# Patient Record
Sex: FEMALE | Race: WHITE | NOT HISPANIC OR LATINO | Employment: UNEMPLOYED | URBAN - METROPOLITAN AREA
[De-identification: names, ages, dates, MRNs, and addresses within clinical notes are randomized per-mention and may not be internally consistent; named-entity substitution may affect disease eponyms.]

---

## 2022-01-31 ENCOUNTER — OFFICE VISIT (OUTPATIENT)
Dept: DIABETES SERVICES | Facility: HOSPITAL | Age: 47
End: 2022-01-31

## 2022-01-31 VITALS
OXYGEN SATURATION: 98 % | DIASTOLIC BLOOD PRESSURE: 88 MMHG | RESPIRATION RATE: 24 BRPM | WEIGHT: 159.17 LBS | BODY MASS INDEX: 26.52 KG/M2 | SYSTOLIC BLOOD PRESSURE: 155 MMHG | HEART RATE: 96 BPM | HEIGHT: 65 IN | TEMPERATURE: 99.1 F

## 2022-01-31 DIAGNOSIS — E10.69 CONTROLLED TYPE 1 DIABETES MELLITUS WITH OTHER COMPLICATION: ICD-10-CM

## 2022-01-31 DIAGNOSIS — E10.65 TYPE 1 DIABETES MELLITUS WITH HYPERGLYCEMIA: Primary | ICD-10-CM

## 2022-01-31 LAB — GLUCOSE BLDC GLUCOMTR-MCNC: 229 MG/DL (ref 70–99)

## 2022-01-31 PROCEDURE — 82962 GLUCOSE BLOOD TEST: CPT | Performed by: NURSE PRACTITIONER

## 2022-01-31 PROCEDURE — 99204 OFFICE O/P NEW MOD 45 MIN: CPT | Performed by: NURSE PRACTITIONER

## 2022-01-31 PROCEDURE — 83036 HEMOGLOBIN GLYCOSYLATED A1C: CPT | Performed by: NURSE PRACTITIONER

## 2022-01-31 PROCEDURE — G0463 HOSPITAL OUTPT CLINIC VISIT: HCPCS | Performed by: NURSE PRACTITIONER

## 2022-01-31 RX ORDER — FLUTICASONE PROPIONATE 50 MCG
SPRAY, SUSPENSION (ML) NASAL
COMMUNITY
End: 2023-01-17

## 2022-01-31 RX ORDER — DESLORATADINE 5 MG/1
TABLET ORAL
COMMUNITY
End: 2022-05-13

## 2022-01-31 RX ORDER — NITROFURANTOIN 25; 75 MG/1; MG/1
CAPSULE ORAL
COMMUNITY
End: 2022-05-13

## 2022-01-31 RX ORDER — INSULIN ASPART 100 [IU]/ML
INJECTION, SOLUTION INTRAVENOUS; SUBCUTANEOUS
COMMUNITY
End: 2022-04-07

## 2022-01-31 RX ORDER — PREDNISOLONE ACETATE 10 MG/ML
SUSPENSION/ DROPS OPHTHALMIC
COMMUNITY
End: 2022-05-13

## 2022-01-31 RX ORDER — CLARITHROMYCIN 500 MG/1
TABLET, COATED ORAL
COMMUNITY
End: 2022-05-13

## 2022-01-31 RX ORDER — SUCRALFATE 1 G/1
TABLET ORAL
COMMUNITY
End: 2022-05-13

## 2022-01-31 RX ORDER — MELOXICAM 7.5 MG/1
TABLET ORAL
COMMUNITY
End: 2022-05-13

## 2022-01-31 RX ORDER — CYCLOBENZAPRINE HCL 10 MG
TABLET ORAL
COMMUNITY
End: 2022-05-13

## 2022-01-31 RX ORDER — BUPROPION HYDROCHLORIDE 150 MG/1
TABLET ORAL
COMMUNITY
End: 2022-05-13

## 2022-01-31 RX ORDER — IBUPROFEN 600 MG/1
TABLET ORAL
COMMUNITY
End: 2023-01-17

## 2022-01-31 RX ORDER — OXYBUTYNIN CHLORIDE 10 MG/1
TABLET, EXTENDED RELEASE ORAL
COMMUNITY
End: 2022-05-13

## 2022-01-31 RX ORDER — NYSTATIN 100000 U/G
CREAM TOPICAL
COMMUNITY
End: 2022-05-13

## 2022-01-31 RX ORDER — FLUCONAZOLE 150 MG/1
TABLET ORAL
COMMUNITY
End: 2023-01-17

## 2022-01-31 RX ORDER — HYDROCODONE BITARTRATE AND ACETAMINOPHEN 5; 325 MG/1; MG/1
TABLET ORAL
COMMUNITY
End: 2023-01-17

## 2022-01-31 RX ORDER — INSULIN GLARGINE 100 [IU]/ML
INJECTION, SOLUTION SUBCUTANEOUS
COMMUNITY
End: 2022-05-13

## 2022-01-31 RX ORDER — BENZONATATE 100 MG/1
CAPSULE ORAL
COMMUNITY
End: 2022-05-13

## 2022-01-31 RX ORDER — DICLOFENAC SODIUM 75 MG/1
TABLET, DELAYED RELEASE ORAL
COMMUNITY
End: 2022-05-13

## 2022-01-31 RX ORDER — ALBUTEROL SULFATE 90 UG/1
AEROSOL, METERED RESPIRATORY (INHALATION)
COMMUNITY
End: 2023-01-17

## 2022-01-31 RX ORDER — FLUOXETINE HYDROCHLORIDE 40 MG/1
CAPSULE ORAL
COMMUNITY
End: 2022-05-13

## 2022-01-31 RX ORDER — GLUCAGON HYDROCHLORIDE 1 MG
KIT INJECTION
COMMUNITY

## 2022-01-31 RX ORDER — CLONAZEPAM 1 MG/1
TABLET ORAL
COMMUNITY
End: 2022-05-13

## 2022-01-31 RX ORDER — TEMAZEPAM 15 MG/1
CAPSULE ORAL
COMMUNITY
End: 2022-05-13

## 2022-01-31 RX ORDER — DIAZEPAM 2 MG/1
TABLET ORAL
COMMUNITY
End: 2023-01-17

## 2022-01-31 RX ORDER — AMITRIPTYLINE HYDROCHLORIDE 25 MG/1
TABLET, FILM COATED ORAL
COMMUNITY
End: 2022-05-13

## 2022-01-31 RX ORDER — INSULIN ASPART 100 [IU]/ML
INJECTION, SOLUTION INTRAVENOUS; SUBCUTANEOUS
COMMUNITY
End: 2022-04-13

## 2022-01-31 RX ORDER — RANITIDINE 150 MG/1
TABLET ORAL
COMMUNITY
End: 2022-05-13

## 2022-01-31 RX ORDER — VILAZODONE HYDROCHLORIDE 40 MG/1
TABLET ORAL
COMMUNITY
End: 2023-01-17

## 2022-01-31 RX ORDER — CHLORHEXIDINE GLUCONATE 0.12 MG/ML
RINSE ORAL
COMMUNITY
End: 2022-05-13

## 2022-01-31 RX ORDER — BUPROPION HYDROCHLORIDE 450 MG/1
TABLET, FILM COATED, EXTENDED RELEASE ORAL
COMMUNITY
End: 2022-05-13

## 2022-01-31 RX ORDER — FENOFIBRATE 145 MG/1
TABLET, COATED ORAL
COMMUNITY
End: 2022-05-13

## 2022-01-31 RX ORDER — QUETIAPINE FUMARATE 100 MG/1
TABLET, FILM COATED ORAL
COMMUNITY
End: 2022-05-13

## 2022-01-31 RX ORDER — DOXEPIN HYDROCHLORIDE 25 MG/1
CAPSULE ORAL
COMMUNITY
End: 2022-05-13

## 2022-01-31 RX ORDER — ONDANSETRON HYDROCHLORIDE 8 MG/1
TABLET, FILM COATED ORAL
COMMUNITY
End: 2023-01-17

## 2022-01-31 RX ORDER — BACLOFEN 10 MG/1
TABLET ORAL
COMMUNITY
End: 2023-01-17

## 2022-01-31 RX ORDER — OMEPRAZOLE 40 MG/1
CAPSULE, DELAYED RELEASE ORAL
COMMUNITY
End: 2023-01-17

## 2022-01-31 RX ORDER — LISINOPRIL 20 MG/1
TABLET ORAL
COMMUNITY
End: 2023-01-17

## 2022-01-31 RX ORDER — SUMATRIPTAN 25 MG/1
TABLET, FILM COATED ORAL
COMMUNITY
End: 2022-05-13

## 2022-01-31 RX ORDER — TIZANIDINE 2 MG/1
TABLET ORAL
COMMUNITY
End: 2022-05-13

## 2022-01-31 RX ORDER — CETIRIZINE HYDROCHLORIDE 10 MG/1
TABLET ORAL
COMMUNITY
End: 2023-01-17

## 2022-01-31 RX ORDER — ACYCLOVIR 800 MG/1
TABLET ORAL
COMMUNITY
End: 2022-05-13

## 2022-01-31 RX ORDER — INSULIN LISPRO 100 [IU]/ML
INJECTION, SOLUTION INTRAVENOUS; SUBCUTANEOUS
COMMUNITY
End: 2022-01-31

## 2022-02-01 LAB
EXPIRATION DATE: ABNORMAL
HBA1C MFR BLD: 7 %
Lab: ABNORMAL

## 2022-02-18 ENCOUNTER — TELEPHONE (OUTPATIENT)
Dept: DIABETES SERVICES | Facility: HOSPITAL | Age: 47
End: 2022-02-18

## 2022-03-04 ENCOUNTER — APPOINTMENT (OUTPATIENT)
Dept: NUTRITION | Facility: HOSPITAL | Age: 47
End: 2022-03-04

## 2022-03-25 ENCOUNTER — DOCUMENTATION (OUTPATIENT)
Dept: DIABETES SERVICES | Facility: CLINIC | Age: 47
End: 2022-03-25

## 2022-03-25 NOTE — PROGRESS NOTES
Patient called the office and stated that this morning she took 3 units of insulin with her breakfast meal and subsequently had severe hypoglycemia.  She is uncertain of exactly how much insulin she took because of confusion related to the hypoglycemic event.  She uses the InPen device so she was advised to go into the software application and review how much was actually administered.  She did treat her hypoglycemia with Mountain Dew and now has a glucose level greater than 400.  She has received her pump supplies and is anxious to get on the device.  She was advised we will call her and schedule first available appointment for her pump start.  She was also advised to contact our office if she continues to have problematic hyperglycemia or hypoglycemia now

## 2022-04-07 NOTE — TELEPHONE ENCOUNTER
Please send to  [facility]  [address] [place] [phone]

## 2022-04-14 ENCOUNTER — APPOINTMENT (OUTPATIENT)
Dept: DIABETES SERVICES | Facility: HOSPITAL | Age: 47
End: 2022-04-14

## 2022-04-20 ENCOUNTER — NUTRITION (OUTPATIENT)
Dept: NUTRITION | Facility: HOSPITAL | Age: 47
End: 2022-04-20

## 2022-04-20 PROCEDURE — 97802 MEDICAL NUTRITION INDIV IN: CPT | Performed by: DIETITIAN, REGISTERED

## 2022-04-21 ENCOUNTER — APPOINTMENT (OUTPATIENT)
Dept: DIABETES SERVICES | Facility: HOSPITAL | Age: 47
End: 2022-04-21

## 2022-04-29 RX ORDER — LANCETS
EACH MISCELLANEOUS
COMMUNITY
End: 2022-04-29 | Stop reason: SDUPTHER

## 2022-05-01 RX ORDER — LANCETS
102 EACH MISCELLANEOUS 4 TIMES DAILY
Qty: 102 EACH | Refills: 5 | Status: SHIPPED | OUTPATIENT
Start: 2022-05-01

## 2022-05-09 ENCOUNTER — TELEPHONE (OUTPATIENT)
Dept: DIABETES SERVICES | Facility: HOSPITAL | Age: 47
End: 2022-05-09

## 2022-05-09 NOTE — TELEPHONE ENCOUNTER
Pt left a message that she is still having problems with insurance and them not wanting to pay for her test strips. Pt does not have any. Has appt Friday. Pt would like a call back to talk about this and what she can/needs to do.

## 2022-05-10 VITALS — HEIGHT: 65 IN | WEIGHT: 156.31 LBS | BODY MASS INDEX: 26.04 KG/M2

## 2022-05-10 NOTE — CONSULTS
Xena Briseno is a 46 y.o. female who presents to [facility] for nutrition consult r/t diagnosis of T1DM.  Xena Briseno is referred by ANAMIKA Sotomayor*.    Past Medical History:   Diagnosis Date   • Anxiety    • Arthritis    • Asthma    • Depression    • Diabetes mellitus type I (HCC)    • GERD (gastroesophageal reflux disease)    • Hyperlipidemia    • Hypertension    • Sleep apnea        Anthropometrics    Wt Readings from Last 1 Encounters:   04/20/22 70.9 kg (156 lb 4.9 oz)     Body mass index is 26.01 kg/m².    Diabetes History  Diabetes History  What type of diabetes do you have?: Type 1  Length of Diabetes Diagnosis: 10 + years  Current DM knowledge: good  Have you had diabetes education/teaching in the past?: yes  Do you test your blood sugar at home?: yes    Education Preferences  Education Preferences  What areas of diabetes would you like to learn about?: diet information    Nutrition Information  Nutrition Information  Enter everything you can remember eating in the last 24 hours (1 day): breakfast- eggs, 1/2 slice fried bologna, 2 slices toast; lunch- dinner leftovers; dinner- chicken, green beans/corn/cooked cabbage/broccoli/peas; snacks- Cheezits; beverages- diet soda (1-2/day), water, coffee w/ creamer  How many meals do you eat each day?: 3  How many snacks do you eat each day?: 1  What is the biggest challenge you have with your diet?: Knowledge    Education Needs  DM Education Needs  Meter: Has own  Medication: Insulin  Problem Solving: Hypoglycemia  Healthy Eating: RD consult, Reviewed meal plan, Basic meal plan provided  Motivation: Engaged  Teaching Method: Explanation, Discussion, Handouts  Patient Response: Verbalized understanding    DM Goals  DM Goals  Healthy Eating - Goal: Today  Being Active - Goal: Today  Taking Medication - Goal: Today  Problem Solving - Goal: Today  Monitoring - Goal: Today  Contact Plan: Follow-up medical  care    Medications  Current Outpatient Medications   Medication Sig Dispense Refill   • Accu-Chek Softclix Lancets lancets 102 each by Other route 4 (Four) Times a Day. Use as instructed 102 each 5   • acyclovir (ZOVIRAX) 800 MG tablet acyclovir 800 mg tablet     • albuterol sulfate  (90 Base) MCG/ACT inhaler albuterol sulfate HFA 90 mcg/actuation aerosol inhaler     • amitriptyline (ELAVIL) 25 MG tablet amitriptyline 25 mg tablet     • baclofen (LIORESAL) 10 MG tablet baclofen 10 mg tablet     • benzonatate (TESSALON) 100 MG capsule benzonatate 100 mg capsule     • buPROPion XL (Forfivo XL) 450 MG 24 hr tablet Forfivo  mg tablet,extended release     • buPROPion XL (WELLBUTRIN XL) 150 MG 24 hr tablet bupropion HCl  mg 24 hr tablet, extended release     • cetirizine (zyrTEC) 10 MG tablet cetirizine 10 mg tablet     • chlorhexidine (Paroex) 0.12 % solution Paroex Oral Rinse 0.12 % mouthwash     • clarithromycin (BIAXIN) 500 MG tablet clarithromycin 500 mg tablet     • clonazePAM (KlonoPIN) 1 MG tablet clonazepam 1 mg tablet     • cyclobenzaprine (FLEXERIL) 10 MG tablet cyclobenzaprine 10 mg tablet     • desloratadine (CLARINEX) 5 MG tablet desloratadine 5 mg tablet     • diazePAM (VALIUM) 2 MG tablet diazepam 2 mg tablet     • diclofenac (VOLTAREN) 75 MG EC tablet diclofenac sodium 75 mg tablet,delayed release     • Diclofenac Sodium (VOLTAREN) 1 % gel gel diclofenac 1 % topical gel     • doxepin (SINEquan) 25 MG capsule doxepin 25 mg capsule     • fenofibrate (TRICOR) 145 MG tablet fenofibrate nanocrystallized 145 mg tablet     • fluconazole (DIFLUCAN) 150 MG tablet fluconazole 150 mg tablet     • FLUoxetine (PROzac) 40 MG capsule fluoxetine 40 mg capsule     • fluticasone (FLONASE) 50 MCG/ACT nasal spray fluticasone propionate 50 mcg/actuation nasal spray,suspension     • glucagon (GlucaGen HypoKit) 1 MG injection GlucaGen HypoKit 1 mg Injection     • glucose blood test strip 50 each by Other  route As Needed. Use as instructed     • glucose blood test strip 1 each by Other route 4 (Four) Times a Day. Use as instructed 150 each 5   • HYDROcodone-acetaminophen (NORCO) 5-325 MG per tablet hydrocodone 5 mg-acetaminophen 325 mg tablet     • ibuprofen (ADVIL,MOTRIN) 600 MG tablet ibuprofen 600 mg tablet     • insulin aspart (NovoLOG) 100 UNIT/ML injection Inject 65 Units under the skin into the appropriate area as directed Daily. 20 mL 5   • Insulin Glargine (Lantus SoloStar) 100 UNIT/ML injection pen Lantus Solostar U-100 Insulin 100 unit/mL (3 mL) subcutaneous pen     • Insulin Lispro 100 UNIT/ML solution cartridge Inject  under the skin into the appropriate area as directed.     • linaclotide (Linzess) 145 MCG capsule capsule Linzess 145 mcg capsule   Take 1 capsule every day by oral route as needed.     • lisinopril (PRINIVIL,ZESTRIL) 20 MG tablet lisinopril 20 mg tablet     • meloxicam (MOBIC) 7.5 MG tablet meloxicam 7.5 mg tablet     • neomycin-polymyxin-dexamethasone (MAXITROL) 0.1 % ophthalmic suspension neomycin-polymyxin-dexameth 3.5 mg/mL-10,000 unit/mL-0.1% eye drops     • nitrofurantoin, macrocrystal-monohydrate, (MACROBID) 100 MG capsule nitrofurantoin monohydrate/macrocrystals 100 mg capsule     • nystatin (MYCOSTATIN) 257089 UNIT/GM cream nystatin 100,000 unit/gram topical cream     • omeprazole (priLOSEC) 40 MG capsule omeprazole 40 mg capsule,delayed release     • ondansetron (ZOFRAN) 8 MG tablet ondansetron HCl 8 mg tablet     • oxybutynin XL (DITROPAN-XL) 10 MG 24 hr tablet oxybutynin chloride ER 10 mg tablet,extended release 24 hr     • prednisoLONE acetate (PRED FORTE) 1 % ophthalmic suspension prednisolone acetate 1 % eye drops,suspension     • QUEtiapine (SEROquel) 100 MG tablet quetiapine 100 mg tablet     • raNITIdine (ZANTAC) 150 MG tablet ranitidine 150 mg tablet     • sucralfate (CARAFATE) 1 g tablet sucralfate 1 gram tablet     • SUMAtriptan (IMITREX) 25 MG tablet sumatriptan 25  mg tablet     • temazepam (RESTORIL) 15 MG capsule temazepam 15 mg capsule     • tiZANidine (ZANAFLEX) 2 MG tablet tizanidine 2 mg tablet     • vilazodone (Viibryd) 40 MG tablet tablet Viibryd 40 mg tablet       No current facility-administered medications for this visit.       Labs   Lab Results   Component Value Date    HGBA1C 7 02/01/2022       Nutrition counseling provided on carbohydrate counting, portion control, measuring and reading labels. Discussed eating out and gave suggestions on controlling carbohydrate intake and making healthier food choices.     Meal Plan:   Total Carbohydrates per meal: 2-3 carb servings/meal, at least 3 meals/day  Lean protein with meals.  Limit added fats.  Snacks: 1 carbohydrate serving (</= 22 g) + 1 protein serving.     Daily exercise encouraged (as recommended by healthcare provider). Discussed the benefits of exercise in lowering blood glucose, blood pressure, cholesterol, stress and controlling body weight.     Advised to continue daily blood glucose monitoring to assist with understanding of factors affecting blood glucose and assist with management of diabetes.  Discussed and provided with target BG ranges.     Literature provided: Diabetes Nutrition Placemat, Choose Your Foods Booklet    Dietitian contact number provided.  Patient encouraged to call with questions or concerns.     Time spent with patient: 45 minutes    Celia Cross RDN, LD  04/20/2022

## 2022-05-13 ENCOUNTER — OFFICE VISIT (OUTPATIENT)
Dept: DIABETES SERVICES | Facility: CLINIC | Age: 47
End: 2022-05-13

## 2022-05-13 VITALS
TEMPERATURE: 97.6 F | OXYGEN SATURATION: 98 % | HEIGHT: 65 IN | WEIGHT: 154 LBS | SYSTOLIC BLOOD PRESSURE: 116 MMHG | DIASTOLIC BLOOD PRESSURE: 63 MMHG | BODY MASS INDEX: 25.66 KG/M2 | HEART RATE: 97 BPM

## 2022-05-13 DIAGNOSIS — E10.65 TYPE 1 DIABETES MELLITUS WITH HYPERGLYCEMIA: ICD-10-CM

## 2022-05-13 DIAGNOSIS — E10.65 UNCONTROLLED TYPE 1 DIABETES MELLITUS WITH HYPERGLYCEMIA: Primary | ICD-10-CM

## 2022-05-13 LAB
EXPIRATION DATE: ABNORMAL
HBA1C MFR BLD: 7.4 %
Lab: ABNORMAL

## 2022-05-13 PROCEDURE — 95251 CONT GLUC MNTR ANALYSIS I&R: CPT | Performed by: NURSE PRACTITIONER

## 2022-05-13 PROCEDURE — 99213 OFFICE O/P EST LOW 20 MIN: CPT | Performed by: NURSE PRACTITIONER

## 2022-05-13 NOTE — PROGRESS NOTES
Chief Complaint  Diabetes (Loves the pump and this has helped out greatly)    Referred By: No ref. provider found    Subjective          Xena Briseno presents to [facility] for insulin pump management    History of Present Illness    Visit type:  follow-up  Diabetes type:  Type 1  Current diabetes status/concerns/issues:  She has recently been started on a Medtronic insulin pump.  It is improved how she feels buy being in better control.  Other health concerns: She has had a lot of emotional stress recently but it seems to be slowly improving and turning around for her  Diabetes symptoms:    Polyuria: Yes   Polydipsia: No   Polyphagia: No   Blurred vision: No   Excessive fatigue: Yes  Diabetes complications:  Neuro: None  Renal: None  Eyes: None  Amputation/Wounds: None  GI: gas and bloating and irregular bowel movements  Cardiovascular: HTN and hyperlipidemia  ED: N/A  Other: None  Hypoglycemia:  None reported at this time  Hypoglycemia Symptoms:  No hypoglycemia at this time  Current diabetes treatment:  Medtronic insulin pump using NovoLog insulin.  Blood glucose device:  [employer] CGM  Blood glucose monitoring frequency:  Continuous per CGM  Blood glucose range/average:  The pump report shows a sensor glucose average of 152 mg/dL) 40 mg/dL.  76% of glucose levels are in target range between 70 and 180 while 24% are above target.  She is doing an excellent job entering her carbohydrates and glucose levels into her device.  Diet:  Carbohydrate Counting, Avoids high carb/sweet foods, Avoids sugary drinks  Activity/Exercise:  None    Past Medical History:   Diagnosis Date   • Anxiety    • Arthritis    • Asthma    • Depression    • Diabetes mellitus type I (HCC)    • GERD (gastroesophageal reflux disease)    • Hyperlipidemia    • Hypertension    • Sleep apnea      Past Surgical History:   Procedure Laterality Date   •  SECTION     • CHOLECYSTECTOMY     • ENDOSCOPY     •  SPINE SURGERY       Family History   Problem Relation Age of Onset   • Heart disease Mother    • Diabetes Father    • Diabetes Brother    • Stroke Paternal Grandmother      Social History     Socioeconomic History   • Marital status: Single   Tobacco Use   • Smoking status: Current Every Day Smoker   • Smokeless tobacco: Never Used   Vaping Use   • Vaping Use: Never used   Substance and Sexual Activity   • Alcohol use: Not Currently   • Drug use: Never   • Sexual activity: Defer     Allergies   Allergen Reactions   • Statins Angioedema       Current Outpatient Medications:   •  Accu-Chek Softclix Lancets lancets, 102 each by Other route 4 (Four) Times a Day. Use as instructed, Disp: 102 each, Rfl: 5  •  albuterol sulfate  (90 Base) MCG/ACT inhaler, albuterol sulfate HFA 90 mcg/actuation aerosol inhaler, Disp: , Rfl:   •  baclofen (LIORESAL) 10 MG tablet, baclofen 10 mg tablet, Disp: , Rfl:   •  cetirizine (zyrTEC) 10 MG tablet, cetirizine 10 mg tablet, Disp: , Rfl:   •  diazePAM (VALIUM) 2 MG tablet, diazepam 2 mg tablet, Disp: , Rfl:   •  fluconazole (DIFLUCAN) 150 MG tablet, fluconazole 150 mg tablet, Disp: , Rfl:   •  fluticasone (FLONASE) 50 MCG/ACT nasal spray, fluticasone propionate 50 mcg/actuation nasal spray,suspension, Disp: , Rfl:   •  glucagon (GlucaGen HypoKit) 1 MG injection, GlucaGen HypoKit 1 mg Injection, Disp: , Rfl:   •  glucose blood test strip, 50 each by Other route As Needed. Use as instructed, Disp: , Rfl:   •  glucose blood test strip, 1 each by Other route 4 (Four) Times a Day. Use as instructed, Disp: 150 each, Rfl: 5  •  HYDROcodone-acetaminophen (NORCO) 5-325 MG per tablet, hydrocodone 5 mg-acetaminophen 325 mg tablet, Disp: , Rfl:   •  ibuprofen (ADVIL,MOTRIN) 600 MG tablet, ibuprofen 600 mg tablet, Disp: , Rfl:   •  insulin aspart (NovoLOG) 100 UNIT/ML injection, Inject 65 Units under the skin into the appropriate area as directed Daily., Disp: 20 mL, Rfl: 5  •  Insulin Lispro  100 UNIT/ML solution cartridge, Inject  under the skin into the appropriate area as directed., Disp: , Rfl:   •  linaclotide (LINZESS) 145 MCG capsule capsule, Linzess 145 mcg capsule  Take 1 capsule every day by oral route as needed., Disp: , Rfl:   •  lisinopril (PRINIVIL,ZESTRIL) 20 MG tablet, lisinopril 20 mg tablet, Disp: , Rfl:   •  omeprazole (priLOSEC) 40 MG capsule, omeprazole 40 mg capsule,delayed release, Disp: , Rfl:   •  ondansetron (ZOFRAN) 8 MG tablet, ondansetron HCl 8 mg tablet, Disp: , Rfl:   •  vilazodone (VIIBRYD) 40 MG tablet tablet, Viibryd 40 mg tablet, Disp: , Rfl:     Review of Systems   Constitutional: Positive for fatigue. Negative for activity change, appetite change, fever, unexpected weight gain and unexpected weight loss.   HENT: Negative for congestion, ear pain, facial swelling, hearing loss, sore throat and tinnitus.    Eyes: Negative for blurred vision, double vision, redness and visual disturbance.   Respiratory: Negative for cough, shortness of breath and wheezing.    Cardiovascular: Negative for chest pain, palpitations and leg swelling.   Gastrointestinal: Positive for abdominal pain, constipation, diarrhea, nausea, vomiting, GERD and indigestion. Negative for abdominal distention.   Endocrine: Positive for polyuria. Negative for polydipsia and polyphagia.   Genitourinary: Negative for difficulty urinating, frequency and urgency.   Musculoskeletal: Positive for arthralgias, back pain, gait problem, myalgias and neck pain.   Skin: Negative for rash, skin lesions and wound.   Neurological: Positive for memory problem. Negative for seizures, speech difficulty, weakness, headache and confusion.   Psychiatric/Behavioral: Positive for sleep disturbance, depressed mood and stress. The patient is nervous/anxious.         Objective     Vitals:    05/13/22 1608   BP: 116/63   BP Location: Right arm   Patient Position: Sitting   Cuff Size: Adult   Pulse: 97   Temp: 97.6 °F (36.4 °C)  "  SpO2: 98%   Weight: 69.9 kg (154 lb)   Height: 165.1 cm (65\")   PainSc:   4     Body mass index is 25.63 kg/m².    Physical Exam  Constitutional:       Appearance: Normal appearance.      Comments: Overweight with BMI of 25.63   HENT:      Head: Normocephalic and atraumatic.      Right Ear: External ear normal.      Left Ear: External ear normal.      Nose: Nose normal.   Eyes:      Extraocular Movements: Extraocular movements intact.      Conjunctiva/sclera: Conjunctivae normal.   Pulmonary:      Effort: Pulmonary effort is normal.   Musculoskeletal:         General: Normal range of motion.      Cervical back: Normal range of motion.   Skin:     General: Skin is warm and dry.   Neurological:      General: No focal deficit present.      Mental Status: She is alert and oriented to person, place, and time. Mental status is at baseline.   Psychiatric:         Mood and Affect: Mood normal.         Behavior: Behavior normal.         Thought Content: Thought content normal.         Judgment: Judgment normal.         Result Review :   The following data was reviewed by: ARACELI Sotomayor on 05/13/2022:    Point-of-care A1c collected in the office today was 7.4% indicating uncontrolled type 1 diabetes.  This is up from the prior result of 7% collected in February of this year.    Most Recent A1C    HGBA1C Most Recent 5/13/22   Hemoglobin A1C 7.4             A1C Last 3 Results    HGBA1C Last 3 Results 2/1/22 5/13/22   Hemoglobin A1C 7 7.4                   Assessment: The patient's glucose levels are improving considerably with the use of the insulin pump.  She is adjusting to using the pump as well as having more normalized glucose levels.  She has had a lots of stress in her personal life recently but states that the situation is improving.      Diagnoses and all orders for this visit:    1. Uncontrolled type 1 diabetes mellitus with hyperglycemia (HCC) (Primary)    2. Type 1 diabetes mellitus with hyperglycemia " ([facility])  -     POC Glycosylated Hemoglobin (Hb A1C)        Plan: We made no changes to the current pump settings.  She will continue to focus on accuracy of carb counting and use of the insulin pump.  She will be scheduled for routine follow-up appointment    The patient will monitor her blood glucose levels using her continuous glucose sensor.  If she develops problematic hyperglycemia or hypoglycemia or adverse drug reactions, she will contact the office for further instructions.        Follow Up     Return in about 2 months (around 7/13/2022) for Pump Eval, CGM Follow-up.    Patient was given instructions and counseling regarding her condition or for health maintenance advice. Please see specific information pulled into the AVS if appropriate.     Jaquelin Grover, APRN  05/13/2022

## 2022-07-29 ENCOUNTER — OFFICE VISIT (OUTPATIENT)
Dept: DIABETES SERVICES | Facility: CLINIC | Age: 47
End: 2022-07-29

## 2022-07-29 VITALS
OXYGEN SATURATION: 98 % | HEIGHT: 65 IN | DIASTOLIC BLOOD PRESSURE: 75 MMHG | TEMPERATURE: 97.6 F | SYSTOLIC BLOOD PRESSURE: 117 MMHG | WEIGHT: 165 LBS | HEART RATE: 84 BPM | BODY MASS INDEX: 27.49 KG/M2

## 2022-07-29 DIAGNOSIS — E10.65 UNCONTROLLED TYPE 1 DIABETES MELLITUS WITH HYPERGLYCEMIA: Primary | ICD-10-CM

## 2022-07-29 DIAGNOSIS — E66.3 OVERWEIGHT (BMI 25.0-29.9): ICD-10-CM

## 2022-07-29 LAB
EXPIRATION DATE: ABNORMAL
HBA1C MFR BLD: 7.3 %
Lab: ABNORMAL

## 2022-07-29 PROCEDURE — 95251 CONT GLUC MNTR ANALYSIS I&R: CPT | Performed by: NURSE PRACTITIONER

## 2022-07-29 PROCEDURE — 99213 OFFICE O/P EST LOW 20 MIN: CPT | Performed by: NURSE PRACTITIONER

## 2022-09-08 RX ORDER — INSULIN ASPART 100 [IU]/ML
INJECTION, SOLUTION INTRAVENOUS; SUBCUTANEOUS
Qty: 20 ML | Refills: 3 | Status: SHIPPED | OUTPATIENT
Start: 2022-09-08 | End: 2023-01-17

## 2022-11-18 RX ORDER — BLOOD SUGAR DIAGNOSTIC
STRIP MISCELLANEOUS
Qty: 100 EACH | Refills: 5 | Status: SHIPPED | OUTPATIENT
Start: 2022-11-18 | End: 2023-01-17

## 2023-01-04 ENCOUNTER — TELEPHONE (OUTPATIENT)
Dept: DIABETES SERVICES | Facility: HOSPITAL | Age: 48
End: 2023-01-04
Payer: MEDICARE

## 2023-01-13 ENCOUNTER — OFFICE VISIT (OUTPATIENT)
Dept: DIABETES SERVICES | Facility: CLINIC | Age: 48
End: 2023-01-13
Payer: MEDICARE

## 2023-01-13 VITALS
HEART RATE: 64 BPM | SYSTOLIC BLOOD PRESSURE: 141 MMHG | DIASTOLIC BLOOD PRESSURE: 69 MMHG | BODY MASS INDEX: 21.83 KG/M2 | WEIGHT: 131 LBS | TEMPERATURE: 96.4 F | OXYGEN SATURATION: 100 % | HEIGHT: 65 IN

## 2023-01-13 DIAGNOSIS — E10.65 UNCONTROLLED TYPE 1 DIABETES MELLITUS WITH HYPERGLYCEMIA: Primary | ICD-10-CM

## 2023-01-13 DIAGNOSIS — Z91.199 NONCOMPLIANCE WITH TREATMENT PLAN: ICD-10-CM

## 2023-01-13 LAB
EXPIRATION DATE: ABNORMAL
HBA1C MFR BLD: 9.2 %
Lab: ABNORMAL

## 2023-01-13 PROCEDURE — 99215 OFFICE O/P EST HI 40 MIN: CPT | Performed by: NURSE PRACTITIONER

## 2023-01-13 NOTE — PROGRESS NOTES
Chief Complaint  Diabetes (Follow up, med mgt, a1c eval )    Referred By: No ref. provider found    Subjective          Xena Briseno presents to [facility] for diabetes medication management    History of Present Illness    Visit type:  follow-up  Diabetes type:  Type 1  Current diabetes status/concerns/issues:  This patient was last seen in 2022.  Upon presentation to the office today the patient is very ill appearing.  She states her glucose level is 535 mg/dL this morning.  She is most likely in DKA.  She is complaining of diarrhea with nausea and vomiting.  She states she has had 7 episodes of diarrhea today.  She is also complaining of chest pain.  She is accompanied by friends who brought her to the appointment today.  When questioning the patient regarding how much insulin she is taking she is very confused.  She is not currently using her insulin pump.  She indicates she does not understand her sliding scale that was prescribed after her last hospitalization.  She states she has been sick like this since December.  She cannot recall how much insulin she took when she checked her blood sugar this morning.    She has been seen in the emergency room 3-4 times over the last couple of weeks because of DKA.  She has gone to [facility] and [facility].  Her friends indicate that she will come out of the hospital and go right back into this condition over and over again.      Past Medical History:   Diagnosis Date   • Anxiety    • Arthritis    • Asthma    • Depression    • Diabetes mellitus type I (HCC)    • GERD (gastroesophageal reflux disease)    • Hyperlipidemia    • Hypertension    • Sleep apnea      Past Surgical History:   Procedure Laterality Date   •  SECTION     • CHOLECYSTECTOMY     • ENDOSCOPY     • SPINE SURGERY       Family History   Problem Relation Age of Onset   • Heart disease Mother    • Diabetes Father    • Diabetes Brother    •  Stroke Paternal Grandmother      Social History     Socioeconomic History   • Marital status: Single   • Number of children: 2   Tobacco Use   • Smoking status: Every Day   • Smokeless tobacco: Never   Vaping Use   • Vaping Use: Never used   Substance and Sexual Activity   • Alcohol use: Not Currently   • Drug use: Never   • Sexual activity: Defer     Allergies   Allergen Reactions   • Statins Angioedema       Current Outpatient Medications:   •  Accu-Chek Softclix Lancets lancets, 102 each by Other route 4 (Four) Times a Day. Use as instructed, Disp: 102 each, Rfl: 5  •  albuterol sulfate  (90 Base) MCG/ACT inhaler, albuterol sulfate HFA 90 mcg/actuation aerosol inhaler, Disp: , Rfl:   •  cetirizine (zyrTEC) 10 MG tablet, cetirizine 10 mg tablet, Disp: , Rfl:   •  glucose blood test strip, 50 each by Other route As Needed. Use as instructed, Disp: , Rfl:   •  ibuprofen (ADVIL,MOTRIN) 600 MG tablet, ibuprofen 600 mg tablet, Disp: , Rfl:   •  linaclotide (LINZESS) 145 MCG capsule capsule, Linzess 145 mcg capsule  Take 1 capsule every day by oral route as needed., Disp: , Rfl:   •  ondansetron (ZOFRAN) 8 MG tablet, ondansetron HCl 8 mg tablet, Disp: , Rfl:   •  Accu-Chek Guide test strip, TEST FOUR TIMES A DAY, Disp: 100 each, Rfl: 5  •  baclofen (LIORESAL) 10 MG tablet, baclofen 10 mg tablet, Disp: , Rfl:   •  diazePAM (VALIUM) 2 MG tablet, diazepam 2 mg tablet, Disp: , Rfl:   •  fluconazole (DIFLUCAN) 150 MG tablet, fluconazole 150 mg tablet, Disp: , Rfl:   •  fluticasone (FLONASE) 50 MCG/ACT nasal spray, fluticasone propionate 50 mcg/actuation nasal spray,suspension, Disp: , Rfl:   •  glucagon (GlucaGen HypoKit) 1 MG injection, GlucaGen HypoKit 1 mg Injection, Disp: , Rfl:   •  HYDROcodone-acetaminophen (NORCO) 5-325 MG per tablet, hydrocodone 5 mg-acetaminophen 325 mg tablet, Disp: , Rfl:   •  Insulin Lispro 100 UNIT/ML solution cartridge, Inject  under the skin into the appropriate area as directed., Disp:  ", Rfl:   •  lisinopril (PRINIVIL,ZESTRIL) 20 MG tablet, lisinopril 20 mg tablet, Disp: , Rfl:   •  NovoLOG 100 UNIT/ML injection, INJECT 65 UNITS AS DIRECTED EVERY DAY, Disp: 20 mL, Rfl: 3  •  omeprazole (priLOSEC) 40 MG capsule, omeprazole 40 mg capsule,delayed release, Disp: , Rfl:   •  vilazodone (VIIBRYD) 40 MG tablet tablet, Viibryd 40 mg tablet, Disp: , Rfl:     Review of Systems   Constitutional: Positive for activity change, appetite change and fatigue. Negative for unexpected weight gain and unexpected weight loss.   Eyes: Positive for blurred vision and visual disturbance.   Gastrointestinal: Positive for abdominal pain, constipation, diarrhea, nausea, vomiting, GERD and indigestion.   Endocrine: Positive for polydipsia and polyuria. Negative for polyphagia.   Neurological: Positive for numbness.        Objective     Vitals:    01/13/23 1449   BP: 141/69   BP Location: Right arm   Patient Position: Sitting   Cuff Size: Adult   Pulse: 64   Temp: 96.4 °F (35.8 °C)   SpO2: 100%   Weight: 59.4 kg (131 lb)   Height: 165.1 cm (65\")   PainSc:   7     Body mass index is 21.8 kg/m².    Physical Exam  Constitutional:       General: She is in acute distress.      Appearance: She is normal weight. She is ill-appearing and toxic-appearing.      Comments: The patient looks very sickly.  Her color is pale.  She is holding her chest at times.  She is very weak on her feet and requires assistance to stay upright with ambulation.   HENT:      Head: Normocephalic and atraumatic.      Right Ear: External ear normal.      Left Ear: External ear normal.      Nose: Nose normal.   Eyes:      Extraocular Movements: Extraocular movements intact.      Conjunctiva/sclera: Conjunctivae normal.   Pulmonary:      Effort: Pulmonary effort is normal.   Musculoskeletal:         General: Normal range of motion.      Cervical back: Normal range of motion.   Skin:     General: Skin is warm and dry.      Coloration: Skin is pale. "   Neurological:      Mental Status: Mental status is at baseline.      Comments: She is able to answer some questions clearly but then seems confused when attempting to answer other questions   Psychiatric:         Mood and Affect: Mood normal.         Behavior: Behavior normal.         Thought Content: Thought content normal.         Judgment: Judgment normal.         Result Review :   The following data was reviewed by: ARACELI Sotomayor on 01/13/2023:    Most Recent A1C    HGBA1C Most Recent 1/13/23   Hemoglobin A1C 9.2 (A)   (A) Abnormal value              A1C Last 3 Results    HGBA1C Last 3 Results 5/13/22 7/29/22 1/13/23   Hemoglobin A1C 7.4 7.3 9.2 (A)   (A) Abnormal value            Point-of-care A1c in the office is 9.2% indicating uncontrolled type 1 diabetes          Assessment: The patient is acutely ill and is most likely in DKA again.  The patient is noncompliant with her insulin.  She is not using her insulin pump.  She is confused about how much basal and bolus insulin she is supposed to be using.  Her breath smells ketotic.  She has a foul smell of diarrhea.  She is very weak and shaky and unable to stand on her feet stably without assistance.      Diagnoses and all orders for this visit:    1. Uncontrolled type 1 diabetes mellitus with hyperglycemia (HCC) (Primary)  -     POC Glycosylated Hemoglobin (Hb A1C)    2. Noncompliance with treatment plan        Plan: The patient was advised that she needs to leave the office and go directly to the emergency room for treatment and management of acute DKA.  She is to have the nursing staff call our office to schedule an appointment as soon as possible after her discharge so we can intervene in regards to the use of her insulin pump or her insulin therapy.          Follow Up     No follow-ups on file.    Patient was given instructions and counseling regarding her condition or for health maintenance advice. Please see specific information pulled into  the AVS if appropriate.     Jaquelin Grover, APRN  01/13/2023      Dictated Utilizing Dragon Dictation.  Please note that portions of this note were completed with a voice recognition program.  Part of this note may be an electronic transcription/translation of spoken language to printed text using the Dragon Dictation System.

## 2023-01-14 ENCOUNTER — HOSPITAL ENCOUNTER (INPATIENT)
Facility: HOSPITAL | Age: 48
LOS: 3 days | Discharge: HOME | DRG: 246 | End: 2023-01-17
Attending: INTERNAL MEDICINE | Admitting: INTERNAL MEDICINE
Payer: MEDICARE

## 2023-01-14 ENCOUNTER — APPOINTMENT (OUTPATIENT)
Dept: GENERAL RADIOLOGY | Facility: HOSPITAL | Age: 48
DRG: 246 | End: 2023-01-14
Payer: MEDICARE

## 2023-01-14 DIAGNOSIS — R77.8 ELEVATED TROPONIN: Primary | ICD-10-CM

## 2023-01-14 DIAGNOSIS — Z95.5 PRESENCE OF STENT IN LEFT CIRCUMFLEX CORONARY ARTERY: ICD-10-CM

## 2023-01-14 PROBLEM — E11.10 DKA (DIABETIC KETOACIDOSIS): Status: ACTIVE | Noted: 2023-01-14

## 2023-01-14 LAB
ALBUMIN SERPL-MCNC: 3.3 G/DL (ref 3.5–5.2)
ALBUMIN/GLOB SERPL: 1.7 G/DL
ALP SERPL-CCNC: 94 U/L (ref 39–117)
ALT SERPL W P-5'-P-CCNC: 28 U/L (ref 1–33)
ANION GAP SERPL CALCULATED.3IONS-SCNC: 10.1 MMOL/L (ref 5–15)
ANION GAP SERPL CALCULATED.3IONS-SCNC: 11 MMOL/L (ref 5–15)
ANION GAP SERPL CALCULATED.3IONS-SCNC: 14.9 MMOL/L (ref 5–15)
ANION GAP SERPL CALCULATED.3IONS-SCNC: 22 MMOL/L (ref 5–15)
ANION GAP SERPL CALCULATED.3IONS-SCNC: 8 MMOL/L (ref 5–15)
APTT PPP: 28.9 SECONDS (ref 22.7–35.4)
AST SERPL-CCNC: 21 U/L (ref 1–32)
ATMOSPHERIC PRESS: 754.6 MMHG
B-OH-BUTYR SERPL-SCNC: 8.15 MMOL/L (ref 0.02–0.27)
BASE EXCESS BLDV CALC-SCNC: -18.9 MMOL/L (ref -2–2)
BDY SITE: ABNORMAL
BILIRUB SERPL-MCNC: <0.2 MG/DL (ref 0–1.2)
BUN SERPL-MCNC: 10 MG/DL (ref 6–20)
BUN SERPL-MCNC: 5 MG/DL (ref 6–20)
BUN SERPL-MCNC: 5 MG/DL (ref 6–20)
BUN SERPL-MCNC: 7 MG/DL (ref 6–20)
BUN SERPL-MCNC: 9 MG/DL (ref 6–20)
BUN/CREAT SERPL: 10 (ref 7–25)
BUN/CREAT SERPL: 12.2 (ref 7–25)
BUN/CREAT SERPL: 14.3 (ref 7–25)
BUN/CREAT SERPL: 8.5 (ref 7–25)
BUN/CREAT SERPL: 8.5 (ref 7–25)
CALCIUM SPEC-SCNC: 7.3 MG/DL (ref 8.6–10.5)
CALCIUM SPEC-SCNC: 7.5 MG/DL (ref 8.6–10.5)
CALCIUM SPEC-SCNC: 7.7 MG/DL (ref 8.6–10.5)
CALCIUM SPEC-SCNC: 7.7 MG/DL (ref 8.6–10.5)
CALCIUM SPEC-SCNC: 8 MG/DL (ref 8.6–10.5)
CHLORIDE SERPL-SCNC: 107 MMOL/L (ref 98–107)
CHLORIDE SERPL-SCNC: 108 MMOL/L (ref 98–107)
CHLORIDE SERPL-SCNC: 109 MMOL/L (ref 98–107)
CHLORIDE SERPL-SCNC: 110 MMOL/L (ref 98–107)
CHLORIDE SERPL-SCNC: 110 MMOL/L (ref 98–107)
CO2 SERPL-SCNC: 11.1 MMOL/L (ref 22–29)
CO2 SERPL-SCNC: 14.9 MMOL/L (ref 22–29)
CO2 SERPL-SCNC: 16 MMOL/L (ref 22–29)
CO2 SERPL-SCNC: 17 MMOL/L (ref 22–29)
CO2 SERPL-SCNC: 6 MMOL/L (ref 22–29)
CREAT SERPL-MCNC: 0.59 MG/DL (ref 0.57–1)
CREAT SERPL-MCNC: 0.59 MG/DL (ref 0.57–1)
CREAT SERPL-MCNC: 0.7 MG/DL (ref 0.57–1)
CREAT SERPL-MCNC: 0.7 MG/DL (ref 0.57–1)
CREAT SERPL-MCNC: 0.74 MG/DL (ref 0.57–1)
D-LACTATE SERPL-SCNC: 0.8 MMOL/L (ref 0.5–2)
EGFRCR SERPLBLD CKD-EPI 2021: 106.8 ML/MIN/1.73
EGFRCR SERPLBLD CKD-EPI 2021: 106.8 ML/MIN/1.73
EGFRCR SERPLBLD CKD-EPI 2021: 111.3 ML/MIN/1.73
EGFRCR SERPLBLD CKD-EPI 2021: 111.3 ML/MIN/1.73
EGFRCR SERPLBLD CKD-EPI 2021: 99.9 ML/MIN/1.73
GLOBULIN UR ELPH-MCNC: 1.9 GM/DL
GLUCOSE BLDC GLUCOMTR-MCNC: 107 MG/DL (ref 70–130)
GLUCOSE BLDC GLUCOMTR-MCNC: 108 MG/DL (ref 70–130)
GLUCOSE BLDC GLUCOMTR-MCNC: 108 MG/DL (ref 70–130)
GLUCOSE BLDC GLUCOMTR-MCNC: 110 MG/DL (ref 70–130)
GLUCOSE BLDC GLUCOMTR-MCNC: 112 MG/DL (ref 70–130)
GLUCOSE BLDC GLUCOMTR-MCNC: 114 MG/DL (ref 70–130)
GLUCOSE BLDC GLUCOMTR-MCNC: 116 MG/DL (ref 70–130)
GLUCOSE BLDC GLUCOMTR-MCNC: 125 MG/DL (ref 70–130)
GLUCOSE BLDC GLUCOMTR-MCNC: 140 MG/DL (ref 70–130)
GLUCOSE BLDC GLUCOMTR-MCNC: 149 MG/DL (ref 70–130)
GLUCOSE BLDC GLUCOMTR-MCNC: 168 MG/DL (ref 70–130)
GLUCOSE BLDC GLUCOMTR-MCNC: 200 MG/DL (ref 70–130)
GLUCOSE BLDC GLUCOMTR-MCNC: 213 MG/DL (ref 70–130)
GLUCOSE BLDC GLUCOMTR-MCNC: 215 MG/DL (ref 70–130)
GLUCOSE BLDC GLUCOMTR-MCNC: 247 MG/DL (ref 70–130)
GLUCOSE BLDC GLUCOMTR-MCNC: 265 MG/DL (ref 70–130)
GLUCOSE BLDC GLUCOMTR-MCNC: 271 MG/DL (ref 70–130)
GLUCOSE BLDC GLUCOMTR-MCNC: 93 MG/DL (ref 70–130)
GLUCOSE SERPL-MCNC: 159 MG/DL (ref 65–99)
GLUCOSE SERPL-MCNC: 169 MG/DL (ref 65–99)
GLUCOSE SERPL-MCNC: 255 MG/DL (ref 65–99)
GLUCOSE SERPL-MCNC: 355 MG/DL (ref 65–99)
GLUCOSE SERPL-MCNC: 88 MG/DL (ref 65–99)
HBA1C MFR BLD: 9.8 % (ref 4.8–5.6)
HCO3 BLDV-SCNC: 6.9 MMOL/L (ref 22–28)
INR PPP: 1 (ref 0.9–1.1)
MAGNESIUM SERPL-MCNC: 1.8 MG/DL (ref 1.6–2.6)
MAGNESIUM SERPL-MCNC: 1.9 MG/DL (ref 1.6–2.6)
MAGNESIUM SERPL-MCNC: 2 MG/DL (ref 1.6–2.6)
MAGNESIUM SERPL-MCNC: 2.5 MG/DL (ref 1.6–2.6)
MAGNESIUM SERPL-MCNC: 4.4 MG/DL (ref 1.6–2.6)
MODALITY: ABNORMAL
OSMOLALITY SERPL: 293 MOSM/KG (ref 275–300)
PCO2 BLDV: 17.7 MM HG (ref 41–51)
PH BLDV: 7.2 PH UNITS (ref 7.31–7.41)
PHOSPHATE SERPL-MCNC: 1.2 MG/DL (ref 2.5–4.5)
PHOSPHATE SERPL-MCNC: 1.2 MG/DL (ref 2.5–4.5)
PHOSPHATE SERPL-MCNC: 1.3 MG/DL (ref 2.5–4.5)
PHOSPHATE SERPL-MCNC: 2.1 MG/DL (ref 2.5–4.5)
PHOSPHATE SERPL-MCNC: 2.5 MG/DL (ref 2.5–4.5)
PO2 BLDV: 52.6 MM HG (ref 35–45)
POTASSIUM SERPL-SCNC: 3.8 MMOL/L (ref 3.5–5.2)
POTASSIUM SERPL-SCNC: 4 MMOL/L (ref 3.5–5.2)
POTASSIUM SERPL-SCNC: 4.3 MMOL/L (ref 3.5–5.2)
POTASSIUM SERPL-SCNC: 4.3 MMOL/L (ref 3.5–5.2)
POTASSIUM SERPL-SCNC: 5 MMOL/L (ref 3.5–5.2)
PROT SERPL-MCNC: 5.2 G/DL (ref 6–8.5)
PROTHROMBIN TIME: 13.3 SECONDS (ref 11.7–14.2)
SAO2 % BLDCOA: 80.2 % (ref 92–99)
SODIUM SERPL-SCNC: 133 MMOL/L (ref 136–145)
SODIUM SERPL-SCNC: 135 MMOL/L (ref 136–145)
SODIUM SERPL-SCNC: 135 MMOL/L (ref 136–145)
SODIUM SERPL-SCNC: 136 MMOL/L (ref 136–145)
SODIUM SERPL-SCNC: 136 MMOL/L (ref 136–145)
TOTAL RATE: 18 BREATHS/MINUTE
TROPONIN T SERPL-MCNC: 0.24 NG/ML (ref 0–0.03)

## 2023-01-14 PROCEDURE — 82010 KETONE BODYS QUAN: CPT | Performed by: INTERNAL MEDICINE

## 2023-01-14 PROCEDURE — 80053 COMPREHEN METABOLIC PANEL: CPT

## 2023-01-14 PROCEDURE — 93005 ELECTROCARDIOGRAM TRACING: CPT

## 2023-01-14 PROCEDURE — 84484 ASSAY OF TROPONIN QUANT: CPT

## 2023-01-14 PROCEDURE — 85610 PROTHROMBIN TIME: CPT

## 2023-01-14 PROCEDURE — 82962 GLUCOSE BLOOD TEST: CPT

## 2023-01-14 PROCEDURE — 85730 THROMBOPLASTIN TIME PARTIAL: CPT

## 2023-01-14 PROCEDURE — 84100 ASSAY OF PHOSPHORUS: CPT

## 2023-01-14 PROCEDURE — 83930 ASSAY OF BLOOD OSMOLALITY: CPT

## 2023-01-14 PROCEDURE — 93010 ELECTROCARDIOGRAM REPORT: CPT | Performed by: INTERNAL MEDICINE

## 2023-01-14 PROCEDURE — 82746 ASSAY OF FOLIC ACID SERUM: CPT | Performed by: NURSE PRACTITIONER

## 2023-01-14 PROCEDURE — 71045 X-RAY EXAM CHEST 1 VIEW: CPT

## 2023-01-14 PROCEDURE — 82607 VITAMIN B-12: CPT | Performed by: NURSE PRACTITIONER

## 2023-01-14 PROCEDURE — 83735 ASSAY OF MAGNESIUM: CPT

## 2023-01-14 PROCEDURE — 93005 ELECTROCARDIOGRAM TRACING: CPT | Performed by: INTERNAL MEDICINE

## 2023-01-14 PROCEDURE — 82803 BLOOD GASES ANY COMBINATION: CPT

## 2023-01-14 PROCEDURE — 83036 HEMOGLOBIN GLYCOSYLATED A1C: CPT

## 2023-01-14 PROCEDURE — 99221 1ST HOSP IP/OBS SF/LOW 40: CPT | Performed by: STUDENT IN AN ORGANIZED HEALTH CARE EDUCATION/TRAINING PROGRAM

## 2023-01-14 PROCEDURE — 83605 ASSAY OF LACTIC ACID: CPT

## 2023-01-14 RX ORDER — ALBUTEROL SULFATE 2.5 MG/3ML
2.5 SOLUTION RESPIRATORY (INHALATION) EVERY 6 HOURS PRN
Status: DISCONTINUED | OUTPATIENT
Start: 2023-01-14 | End: 2023-01-17 | Stop reason: HOSPADM

## 2023-01-14 RX ORDER — HEPARIN SODIUM 5000 [USP'U]/ML
30-60 INJECTION, SOLUTION INTRAVENOUS; SUBCUTANEOUS EVERY 6 HOURS PRN
Status: DISCONTINUED | OUTPATIENT
Start: 2023-01-14 | End: 2023-01-14

## 2023-01-14 RX ORDER — ACETAMINOPHEN 650 MG/1
650 SUPPOSITORY RECTAL EVERY 4 HOURS PRN
Status: DISCONTINUED | OUTPATIENT
Start: 2023-01-14 | End: 2023-01-17 | Stop reason: HOSPADM

## 2023-01-14 RX ORDER — VILAZODONE HYDROCHLORIDE 40 MG/1
40 TABLET ORAL DAILY
Status: DISCONTINUED | OUTPATIENT
Start: 2023-01-14 | End: 2023-01-14

## 2023-01-14 RX ORDER — BACLOFEN 10 MG/1
5 TABLET ORAL EVERY 8 HOURS SCHEDULED
Status: DISCONTINUED | OUTPATIENT
Start: 2023-01-14 | End: 2023-01-14

## 2023-01-14 RX ORDER — DEXTROSE MONOHYDRATE 25 G/50ML
10-50 INJECTION, SOLUTION INTRAVENOUS
Status: DISCONTINUED | OUTPATIENT
Start: 2023-01-14 | End: 2023-01-15

## 2023-01-14 RX ORDER — SODIUM CHLORIDE 0.9 % (FLUSH) 0.9 %
10 SYRINGE (ML) INJECTION EVERY 12 HOURS SCHEDULED
Status: DISCONTINUED | OUTPATIENT
Start: 2023-01-14 | End: 2023-01-17 | Stop reason: HOSPADM

## 2023-01-14 RX ORDER — SODIUM CHLORIDE 9 MG/ML
40 INJECTION, SOLUTION INTRAVENOUS AS NEEDED
Status: DISCONTINUED | OUTPATIENT
Start: 2023-01-14 | End: 2023-01-17 | Stop reason: HOSPADM

## 2023-01-14 RX ORDER — POTASSIUM CHLORIDE 7.45 MG/ML
10 INJECTION INTRAVENOUS
Status: DISCONTINUED | OUTPATIENT
Start: 2023-01-14 | End: 2023-01-17 | Stop reason: HOSPADM

## 2023-01-14 RX ORDER — SODIUM CHLORIDE AND POTASSIUM CHLORIDE 300; 900 MG/100ML; MG/100ML
250 INJECTION, SOLUTION INTRAVENOUS CONTINUOUS PRN
Status: DISCONTINUED | OUTPATIENT
Start: 2023-01-14 | End: 2023-01-15

## 2023-01-14 RX ORDER — DEXTROSE AND SODIUM CHLORIDE 5; .45 G/100ML; G/100ML
150 INJECTION, SOLUTION INTRAVENOUS CONTINUOUS PRN
Status: DISCONTINUED | OUTPATIENT
Start: 2023-01-14 | End: 2023-01-15

## 2023-01-14 RX ORDER — POTASSIUM CHLORIDE, DEXTROSE MONOHYDRATE AND SODIUM CHLORIDE 300; 5; 900 MG/100ML; G/100ML; MG/100ML
150 INJECTION, SOLUTION INTRAVENOUS CONTINUOUS PRN
Status: DISCONTINUED | OUTPATIENT
Start: 2023-01-14 | End: 2023-01-15

## 2023-01-14 RX ORDER — SODIUM CHLORIDE 450 MG/100ML
250 INJECTION, SOLUTION INTRAVENOUS CONTINUOUS PRN
Status: DISCONTINUED | OUTPATIENT
Start: 2023-01-14 | End: 2023-01-15

## 2023-01-14 RX ORDER — HEPARIN SODIUM 10000 [USP'U]/100ML
12 INJECTION, SOLUTION INTRAVENOUS
Status: DISCONTINUED | OUTPATIENT
Start: 2023-01-14 | End: 2023-01-14

## 2023-01-14 RX ORDER — MAGNESIUM SULFATE 1 G/100ML
1 INJECTION INTRAVENOUS AS NEEDED
Status: DISCONTINUED | OUTPATIENT
Start: 2023-01-14 | End: 2023-01-17 | Stop reason: HOSPADM

## 2023-01-14 RX ORDER — SODIUM CHLORIDE 0.9 % (FLUSH) 0.9 %
10 SYRINGE (ML) INJECTION AS NEEDED
Status: DISCONTINUED | OUTPATIENT
Start: 2023-01-14 | End: 2023-01-17 | Stop reason: HOSPADM

## 2023-01-14 RX ORDER — BACLOFEN 10 MG/1
5 TABLET ORAL EVERY 8 HOURS PRN
Status: DISCONTINUED | OUTPATIENT
Start: 2023-01-14 | End: 2023-01-15

## 2023-01-14 RX ORDER — SODIUM CHLORIDE AND POTASSIUM CHLORIDE 150; 450 MG/100ML; MG/100ML
250 INJECTION, SOLUTION INTRAVENOUS CONTINUOUS PRN
Status: DISCONTINUED | OUTPATIENT
Start: 2023-01-14 | End: 2023-01-15

## 2023-01-14 RX ORDER — ACETAMINOPHEN 325 MG/1
650 TABLET ORAL EVERY 4 HOURS PRN
Status: DISCONTINUED | OUTPATIENT
Start: 2023-01-14 | End: 2023-01-17 | Stop reason: HOSPADM

## 2023-01-14 RX ORDER — SODIUM CHLORIDE AND POTASSIUM CHLORIDE 150; 900 MG/100ML; MG/100ML
250 INJECTION, SOLUTION INTRAVENOUS CONTINUOUS PRN
Status: DISCONTINUED | OUTPATIENT
Start: 2023-01-14 | End: 2023-01-15

## 2023-01-14 RX ORDER — ONDANSETRON 2 MG/ML
4 INJECTION INTRAMUSCULAR; INTRAVENOUS EVERY 6 HOURS PRN
Status: DISCONTINUED | OUTPATIENT
Start: 2023-01-14 | End: 2023-01-17 | Stop reason: HOSPADM

## 2023-01-14 RX ORDER — NICOTINE POLACRILEX 4 MG
15 LOZENGE BUCCAL
Status: DISCONTINUED | OUTPATIENT
Start: 2023-01-14 | End: 2023-01-15

## 2023-01-14 RX ORDER — DEXTROSE, SODIUM CHLORIDE, AND POTASSIUM CHLORIDE 5; .9; .15 G/100ML; G/100ML; G/100ML
150 INJECTION INTRAVENOUS CONTINUOUS PRN
Status: DISCONTINUED | OUTPATIENT
Start: 2023-01-14 | End: 2023-01-15

## 2023-01-14 RX ORDER — DEXTROSE AND SODIUM CHLORIDE 5; .9 G/100ML; G/100ML
150 INJECTION, SOLUTION INTRAVENOUS CONTINUOUS PRN
Status: DISCONTINUED | OUTPATIENT
Start: 2023-01-14 | End: 2023-01-15

## 2023-01-14 RX ORDER — MAGNESIUM SULFATE HEPTAHYDRATE 40 MG/ML
2 INJECTION, SOLUTION INTRAVENOUS AS NEEDED
Status: DISCONTINUED | OUTPATIENT
Start: 2023-01-14 | End: 2023-01-17 | Stop reason: HOSPADM

## 2023-01-14 RX ORDER — PANTOPRAZOLE SODIUM 40 MG/1
40 TABLET, DELAYED RELEASE ORAL
Status: DISCONTINUED | OUTPATIENT
Start: 2023-01-14 | End: 2023-01-17 | Stop reason: HOSPADM

## 2023-01-14 RX ORDER — POTASSIUM CHLORIDE 1.5 G/1.77G
40 POWDER, FOR SOLUTION ORAL AS NEEDED
Status: DISCONTINUED | OUTPATIENT
Start: 2023-01-14 | End: 2023-01-17 | Stop reason: HOSPADM

## 2023-01-14 RX ORDER — POTASSIUM CHLORIDE 750 MG/1
40 TABLET, FILM COATED, EXTENDED RELEASE ORAL AS NEEDED
Status: DISCONTINUED | OUTPATIENT
Start: 2023-01-14 | End: 2023-01-17 | Stop reason: HOSPADM

## 2023-01-14 RX ORDER — SODIUM CHLORIDE 9 MG/ML
250 INJECTION, SOLUTION INTRAVENOUS CONTINUOUS PRN
Status: DISCONTINUED | OUTPATIENT
Start: 2023-01-14 | End: 2023-01-15

## 2023-01-14 RX ORDER — HYDROCODONE BITARTRATE AND ACETAMINOPHEN 5; 325 MG/1; MG/1
1 TABLET ORAL EVERY 6 HOURS PRN
Status: DISCONTINUED | OUTPATIENT
Start: 2023-01-14 | End: 2023-01-15

## 2023-01-14 RX ORDER — DEXTROSE, SODIUM CHLORIDE, AND POTASSIUM CHLORIDE 5; .45; .15 G/100ML; G/100ML; G/100ML
150 INJECTION INTRAVENOUS CONTINUOUS PRN
Status: DISCONTINUED | OUTPATIENT
Start: 2023-01-14 | End: 2023-01-15

## 2023-01-14 RX ORDER — DEXTROSE, SODIUM CHLORIDE, AND POTASSIUM CHLORIDE 5; .45; .3 G/100ML; G/100ML; G/100ML
150 INJECTION INTRAVENOUS CONTINUOUS PRN
Status: DISCONTINUED | OUTPATIENT
Start: 2023-01-14 | End: 2023-01-15

## 2023-01-14 RX ADMIN — HYDROCODONE BITARTRATE AND ACETAMINOPHEN 1 TABLET: 5; 325 TABLET ORAL at 12:18

## 2023-01-14 RX ADMIN — Medication 10 ML: at 09:17

## 2023-01-14 RX ADMIN — PANTOPRAZOLE SODIUM 40 MG: 40 TABLET, DELAYED RELEASE ORAL at 05:45

## 2023-01-14 RX ADMIN — POTASSIUM CHLORIDE, DEXTROSE MONOHYDRATE AND SODIUM CHLORIDE 150 ML/HR: 150; 5; 450 INJECTION, SOLUTION INTRAVENOUS at 09:13

## 2023-01-14 RX ADMIN — SODIUM PHOSPHATE, MONOBASIC, MONOHYDRATE AND SODIUM PHOSPHATE, DIBASIC, ANHYDROUS 20 MMOL: 276; 142 INJECTION, SOLUTION INTRAVENOUS at 16:00

## 2023-01-14 RX ADMIN — POTASSIUM CHLORIDE, DEXTROSE MONOHYDRATE AND SODIUM CHLORIDE 150 ML/HR: 150; 5; 450 INJECTION, SOLUTION INTRAVENOUS at 17:22

## 2023-01-14 RX ADMIN — Medication 10 ML: at 21:34

## 2023-01-14 RX ADMIN — INSULIN HUMAN 1.5 UNITS/HR: 1 INJECTION, SOLUTION INTRAVENOUS at 09:07

## 2023-01-14 RX ADMIN — HYDROCODONE BITARTRATE AND ACETAMINOPHEN 1 TABLET: 5; 325 TABLET ORAL at 20:33

## 2023-01-14 NOTE — Clinical Note
First balloon inflation max pressure = 22 remington. First balloon inflation duration = 20 seconds. Second inflation of balloon - Max pressure = 18 remington. 2nd Inflation of balloon - Duration = 10 seconds. 2nd inflation was done at 10:36 EST. Third inflation of balloon - Max pressure = 24 remington. 3rd Inflation of balloon - Duration = 15 seconds. 3rd inflation was done at 10:38 EST.

## 2023-01-14 NOTE — LETTER
[facility]  [facility]  [phone]    ACCESS CENTER STATEMENT OF DISPOSITION        I, Xena Briseno, was assessed in the [facility] at [facility] on 1/17/2023.  I understand the recommendations below.    1. [address] [address] [phone]            ________________________________  Clinician Signature    1/17/2023  08:04 EST

## 2023-01-14 NOTE — Clinical Note
Prepped: right groin and Right Wrist. Prepped with: ChloraPrep. The site was clipped. The patient was draped in a sterile fashion.

## 2023-01-14 NOTE — Clinical Note
The right DP pulse is +2. The right PT pulse is +2. The right radial pulse is +2. The right ulnar pulse is +1. The right femoral pulse is +2.

## 2023-01-14 NOTE — PLAN OF CARE
Goal Outcome Evaluation:      Patient remains in CCU on DKA protocol, labs improving, insulin gtt continues. Prn norco for pain x1. VSS.

## 2023-01-14 NOTE — Clinical Note
Hemostasis started on the right radial artery. R-Band was used in achieving hemostasis. Radial compression device applied to vessel. Hemostasis achieved successfully. Closure device additional comment: TR Band applied-12atm

## 2023-01-14 NOTE — H&P
HISTORY AND PHYSICAL    Patient Identification:  Xena Briseno  47 y.o.  female  1975  [id number]            CC: DKA    History of Present Illness:  Xena Briseno is a 47-year-old female with a history of diabetes mellitus, hyperlipidemia, hypertension, and chronic back pain.  She is a current every day smoker, one pack/day since she was 12 years old (35 pack/years).    She has had multiple recent hospitalizations (3-4 in a matter of weeks) due to DKA. Patient reports she had a hypoglycemic event in December related to a defective insulin pump, and since then, she has not been wearing her insulin pump.     Patient was originally evaluated on 1/13/2023 by her endocrinologist for complaints of elevated glucose, diarrhea, nausea and vomiting.  She also complains of chest pain.  Blood glucose was over 500 and patient was referred to the ED at [facility] for further evaluation.  ED evaluation at the outside hospital included a urinalysis revealing ketonuria, arterial pH of 7.2, serum bicarb of 10, anion gap of 32, and elevated glucose.  Additionally, patient had an elevated troponin with an EKG showing sinus rhythm with PACs.   She received a total of 3 L of normal saline and was started on insulin drip.  She was also started on a heparin drip for NSTEMI with her elevated troponin.    Patient was transferred to [facility] for further management of DKA and NSTEMI.    Review of Systems:  Constitutional: Positive for fatigue, activity change.  Negative for fevers or chills.  HEENT: Positive for congestion, postnasal drip, rhinorrhea.  Negative for eye discharge, itching or pain.  Respiratory: Positive for chest tightness, shortness of breath.  Negative for apnea, choking, cough or wheezing.  Cardiac: Positive for intermittent substernal chest pain, worse with exertion, leg swelling and palpitations.  GI: Positive for abdominal pain, nausea vomiting and diarrhea.  Endocrine: Positive for  polydipsia, polyphagia, polyuria.  : Negative for dysuria, frequency or urgency.  Musculoskeletal: Negative for arthralgias or myalgias.  Positive for chronic back discomfort.  Skin: Negative for color change, rash, pallor, or wound.  Neurological: Negative for dizziness, lightheadedness, headache, syncope or weakness.    Past Medical History:  Past Medical History:   Diagnosis Date   • Anxiety    • Arthritis    • Asthma    • Depression    • Diabetes mellitus type I (HCC)    • GERD (gastroesophageal reflux disease)    • Hyperlipidemia    • Hypertension    • Sleep apnea        Past Surgical History:  Past Surgical History:   Procedure Laterality Date   •  SECTION     • CHOLECYSTECTOMY     • ENDOSCOPY     • SPINE SURGERY          Home Meds:  Medications Prior to Admission   Medication Sig Dispense Refill Last Dose   • Accu-Chek Guide test strip TEST FOUR TIMES A  each 5    • Accu-Chek Softclix Lancets lancets 102 each by Other route 4 (Four) Times a Day. Use as instructed 102 each 5    • albuterol sulfate  (90 Base) MCG/ACT inhaler albuterol sulfate HFA 90 mcg/actuation aerosol inhaler      • baclofen (LIORESAL) 10 MG tablet baclofen 10 mg tablet      • cetirizine (zyrTEC) 10 MG tablet cetirizine 10 mg tablet      • diazePAM (VALIUM) 2 MG tablet diazepam 2 mg tablet      • fluconazole (DIFLUCAN) 150 MG tablet fluconazole 150 mg tablet      • fluticasone (FLONASE) 50 MCG/ACT nasal spray fluticasone propionate 50 mcg/actuation nasal spray,suspension      • glucagon (GlucaGen HypoKit) 1 MG injection GlucaGen HypoKit 1 mg Injection      • glucose blood test strip 50 each by Other route As Needed. Use as instructed      • HYDROcodone-acetaminophen (NORCO) 5-325 MG per tablet hydrocodone 5 mg-acetaminophen 325 mg tablet      • ibuprofen (ADVIL,MOTRIN) 600 MG tablet ibuprofen 600 mg tablet      • Insulin Lispro 100 UNIT/ML solution cartridge Inject  under the skin into the appropriate area as  directed.      • linaclotide (LINZESS) 145 MCG capsule capsule Linzess 145 mcg capsule   Take 1 capsule every day by oral route as needed.      • lisinopril (PRINIVIL,ZESTRIL) 20 MG tablet lisinopril 20 mg tablet      • NovoLOG 100 UNIT/ML injection INJECT 65 UNITS AS DIRECTED EVERY DAY 20 mL 3    • omeprazole (priLOSEC) 40 MG capsule omeprazole 40 mg capsule,delayed release      • ondansetron (ZOFRAN) 8 MG tablet ondansetron HCl 8 mg tablet      • vilazodone (VIIBRYD) 40 MG tablet tablet Viibryd 40 mg tablet          Allergies:  Allergies   Allergen Reactions   • Statins Angioedema       Social History:   Social History     Socioeconomic History   • Marital status: Single   • Number of children: 2   Tobacco Use   • Smoking status: Every Day   • Smokeless tobacco: Never   Vaping Use   • Vaping Use: Never used   Substance and Sexual Activity   • Alcohol use: Not Currently   • Drug use: Never   • Sexual activity: Defer       Family History:  Family History   Problem Relation Age of Onset   • Heart disease Mother    • Diabetes Father    • Diabetes Brother    • Stroke Paternal Grandmother        Physical Exam:  There were no vitals taken for this visit. There is no height or weight on file to calculate BMI.        Physical Exam:  Constitutional: Alert, normally appearing female.  Appears stated age.  Does not appear in acute distress.  Head: Normocephalic, atraumatic.  Mouth/throat: Dry oral mucous membranes.  Eyes: Pupils are equal round and reactive to light, injected conjunctivae.  Neck: Supple, no JVD, trachea midline, no palpable cervical lymphadenopathy.  Cardiovascular: Normal rate, regular rhythm, no murmur or gallop  Pulmonary: Normal respiratory effort, no respiratory distress, lung sounds are clear and equal bilaterally, chest expansion symmetrical, no wheezes stridor or rales.  Abdominal: Flat, soft, vague generalized tenderness.  : Not assessed.  Musculoskeletal: No swelling or tenderness over joint  spaces, no bilateral lower extremity edema.  Skin: Cool, dry, mottled and bilateral lower extremity edema, capillary refill 3 seconds.  Neurological: No focal deficit, alert and oriented x3.  No sensory or motor deficit appreciated on exam.    LABS:  No results found for: COVID19    No results found for: CALCIUM, PHOS  Results from last 7 days   Lab Units 01/13/23  2316 01/13/23  1752   MAGNESIUM mg/dL 2.2 2.1   PROCALCITONIN ng/mL  --  0.22     No results found for: CKTOTAL, CKMB, CKMBINDEX, TROPONINI, TROPONINT          Results from last 7 days   Lab Units 01/13/23  1752   PROCALCITONIN ng/mL 0.22             Results from last 7 days   Lab Units 01/13/23  2316   INR  1.25*         Lab Results   Component Value Date    TSH 1.316 01/13/2023     CrCl cannot be calculated (No successful lab value found.).         Imaging: I personally visualized the images of scans/x-rays performed within last 3 days.      Assessment:  1. Diabetic ketoacidosis  2. Leukocytosis, WBC 17.9  3. NSTEMI  4. ? COPD  5. Current tobacco abuse (35 pack/years)  6. GERD  7. Hyperlipidemia  8. Hypertension  9. Chronic back pain  10. Anxiety/depression  11. Medical noncompliance    Recommendations:  · Admit patient to the ICU for management of DKA and NSTEMI.  · Stat labs ordered: Lactic acid, CMP, CBC.   · DKA protocol ordered including insulin drip, every hour Accu-Cheks, and IV fluids.  · WBC at outside hospital was 17.9, no identifiable source of infection and patient afebrile.  Leukocytosis likely reactive, we will continue to monitor, no indication for antibiotics at this time.  · NSTEMI, likely stress induced ischemia-troponin was elevated at outside facility.  A stat troponin and stat EKG was ordered.  Heparin drip was initiated per ACS protocol at the outside facility, continue for now.  Consult cardiology for elevated troponin and continued complaints of chest discomfort.  · Current tobacco abuse/COPD: Patient denies ever seeing a  pulmonologist for her COPD, however is on Spiriva at home for maintenance therapy with as needed albuterol for wheezing.  Counseled patient on necessity for tobacco cessation.  · Diabetes educator consulted.  Patient has a history of medical noncompliance with not using her insulin pump as prescribed, and has a reported education deficit with how to use sliding scale insulin.  Will definitely need reinforcement prior to discharge.  · Keep patient NPO for now, patient is able to have sips of water/noncaloric beverages.  · Hold antihypertensive medications for now, as needed hydralazine ordered for SBP greater than 160.  · As needed Zofran for nausea.  Home dose of baclofen ordered for patient's chronic back pain/muscle spasms.    Discussed with patient the serious nature of repeated admissions for DKA and increased risk of complications with uncontrolled diabetes.  Answer questions the best of my abilities.  Patient is a full code at this time.    Patient was placed in face mask upon entering room and kept mask on throughout our encounter. I wore full protective equipment throughout this patient encounter including a face mask, gown and gloves. Hand hygiene was performed before donning protective equipment and after removal when leaving the room.    Antonia Woodson, APRN  1/14/2023  04:39 EST      Much of this encounter note is an electronic transcription/translation of spoken language to printed text using Dragon Software.

## 2023-01-14 NOTE — NURSING NOTE
Pt arrived to unit via EMS transport, alert/orientated, appropriate on RA sats > 95%. Provider Chintan Knight @ .  upon arrival. Labs sent, awaiting results before restarting insulin gtt and heparin gtt per provider. Will cont to monitor.

## 2023-01-14 NOTE — CONSULTS
Date of Hospital Visit: 23  Encounter Provider: Santosh Tran MD  Place of Service: [facility] CARDIOLOGY  Patient Name: Xena Briseno  :1975  Referral Provider: Zaira Capone, PT    Chief complaint  DKA    History of Present Illness  47-year-old woman current smoker with diabetes, hypertension, hyperlipidemia who was transferred to [facility] for further management of DKA and elevated troponin.  Patient has had multiple recent hospitalizations for DKA over the past couple weeks.  She apparently had not been using her insulin pump because it was defective.  She scented to her endocrinologist for multiple complaints including elevated glucose, diarrhea, nausea and vomiting as well as chest pain.  She was found to have a blood glucose over 500 and was referred to the ED at [facility] where she was found to have a pH of 7.2, anion gap of 32, elevated glucose.  She also found to have an elevated troponin with a normal EKG.  She was started on insulin drip, heparin drip, given IV fluids and transferred here.  We have been consulted for elevated troponin.    Blood work here with troponin of 0.24, creatinine 0.7, lactate 0.8. EKG with normal sinus rhythm, no ischemic changes.    Today, patient feels fatigued.  She notes chronic chest pain and shortness of breath that has been going on for over a month. She states it occurs at random times and she could just be sitting at home watching TV before development of the symptoms.    Past Medical History:   Diagnosis Date   • Anxiety    • Arthritis    • Asthma    • Depression    • Diabetes mellitus type I (HCC)    • GERD (gastroesophageal reflux disease)    • Hyperlipidemia    • Hypertension    • Sleep apnea        Past Surgical History:   Procedure Laterality Date   •  SECTION     • CHOLECYSTECTOMY     • ENDOSCOPY     • SPINE SURGERY         Medications Prior to Admission   Medication Sig Dispense Refill Last Dose   •  Accu-Chek Guide test strip TEST FOUR TIMES A  each 5    • Accu-Chek Softclix Lancets lancets 102 each by Other route 4 (Four) Times a Day. Use as instructed 102 each 5    • albuterol sulfate  (90 Base) MCG/ACT inhaler albuterol sulfate HFA 90 mcg/actuation aerosol inhaler      • baclofen (LIORESAL) 10 MG tablet baclofen 10 mg tablet      • cetirizine (zyrTEC) 10 MG tablet cetirizine 10 mg tablet      • diazePAM (VALIUM) 2 MG tablet diazepam 2 mg tablet      • fluconazole (DIFLUCAN) 150 MG tablet fluconazole 150 mg tablet      • fluticasone (FLONASE) 50 MCG/ACT nasal spray fluticasone propionate 50 mcg/actuation nasal spray,suspension      • glucagon (GlucaGen HypoKit) 1 MG injection GlucaGen HypoKit 1 mg Injection      • glucose blood test strip 50 each by Other route As Needed. Use as instructed      • HYDROcodone-acetaminophen (NORCO) 5-325 MG per tablet hydrocodone 5 mg-acetaminophen 325 mg tablet      • ibuprofen (ADVIL,MOTRIN) 600 MG tablet ibuprofen 600 mg tablet      • Insulin Lispro 100 UNIT/ML solution cartridge Inject  under the skin into the appropriate area as directed.      • linaclotide (LINZESS) 145 MCG capsule capsule Linzess 145 mcg capsule   Take 1 capsule every day by oral route as needed.      • lisinopril (PRINIVIL,ZESTRIL) 20 MG tablet lisinopril 20 mg tablet      • NovoLOG 100 UNIT/ML injection INJECT 65 UNITS AS DIRECTED EVERY DAY 20 mL 3    • omeprazole (priLOSEC) 40 MG capsule omeprazole 40 mg capsule,delayed release      • ondansetron (ZOFRAN) 8 MG tablet ondansetron HCl 8 mg tablet      • vilazodone (VIIBRYD) 40 MG tablet tablet Viibryd 40 mg tablet          Current Meds  Scheduled Meds:baclofen, 5 mg, Oral, Q8H  pantoprazole, 40 mg, Oral, Q AM  sodium chloride, 10 mL, Intravenous, Q12H  vilazodone, 40 mg, Oral, Daily      Continuous Infusions:dextrose 5 % and sodium chloride 0.45 %, 150 mL/hr  dextrose 5 % and sodium chloride 0.45 % with KCl 20 mEq/L, 150 mL/hr  dextrose 5  % and sodium chloride 0.45 % with KCl 40 mEq/L, 150 mL/hr  dextrose 5 % and sodium chloride 0.9 %, 150 mL/hr  dextrose 5 % and sodium chloride 0.9 % with KCl 20 mEq, 150 mL/hr  dextrose 5% and sodium chloride 0.9% with KCl 40 mEq/L, 150 mL/hr  heparin, 12 Units/kg/hr  insulin, 0-100 Units/hr  custom IV KCl infusion builder, 250 mL/hr  sodium chloride, 250 mL/hr  sodium chloride 0.45 % with KCl 20 mEq, 250 mL/hr  sodium chloride, 250 mL/hr  sodium chloride 0.9 % with KCl 20 mEq, 250 mL/hr  sodium chloride 0.9 % with KCl 40 mEq/L, 250 mL/hr      PRN Meds:.•  acetaminophen **OR** acetaminophen  •  albuterol  •  dextrose  •  dextrose  •  dextrose 5 % and sodium chloride 0.45 %  •  dextrose 5 % and sodium chloride 0.45 % with KCl 20 mEq/L  •  dextrose 5 % and sodium chloride 0.45 % with KCl 40 mEq/L  •  dextrose 5 % and sodium chloride 0.9 %  •  dextrose 5 % and sodium chloride 0.9 % with KCl 20 mEq  •  dextrose 5% and sodium chloride 0.9% with KCl 40 mEq/L  •  glucagon (human recombinant)  •  heparin (porcine)  •  HYDROcodone-acetaminophen  •  magnesium sulfate **OR** magnesium sulfate in D5W 1g/100mL (PREMIX)  •  ondansetron  •  potassium chloride **OR** potassium chloride **OR** potassium chloride  •  potassium phosphate infusion greater than 15 mMoles **OR** potassium phosphate infusion greater than 15 mMoles **OR** potassium phosphate **OR** sodium phosphate IVPB **OR** sodium phosphate IVPB  •  custom IV KCl infusion builder  •  sodium chloride  •  sodium chloride 0.45 % with KCl 20 mEq  •  sodium chloride  •  sodium chloride  •  sodium chloride  •  sodium chloride 0.9 % with KCl 20 mEq  •  sodium chloride 0.9 % with KCl 40 mEq/L    Allergies as of 01/14/2023 - Reviewed 01/14/2023   Allergen Reaction Noted   • Statins Angioedema 01/31/2022       Social History     Socioeconomic History   • Marital status: Single   • Number of children: 2   Tobacco Use   • Smoking status: Every Day   • Smokeless tobacco: Never    Vaping Use   • Vaping Use: Never used   Substance and Sexual Activity   • Alcohol use: Not Currently   • Drug use: Never   • Sexual activity: Defer       Family History   Problem Relation Age of Onset   • Heart disease Mother    • Diabetes Father    • Diabetes Brother    • Stroke Paternal Grandmother        REVIEW OF SYSTEMS:   12 point ROS was performed and is negative except as outlined in HPI          Objective:   Temp:  [98.4 °F (36.9 °C)] 98.4 °F (36.9 °C)  Heart Rate:  [90-95] 95  Resp:  [14] 14  BP: (84-90)/(53-58) 90/53  There is no height or weight on file to calculate BMI.    Vitals:    01/14/23 0830   BP:    Pulse: 95   Resp:    Temp:    SpO2: 100%       GEN: no distress, alert and oriented  HEENT: NACT, EOMI, moist mucous membranes  Lungs: CTAB, no wheezes, rales or rhonchi  CV: normal rate, regular rhythm, normal S1, S2, no murmurs, +2 radial pulses b/l, no carotid bruit  Abdomen: soft, nontender, nondistended, NABS  Extremities: no edema  Skin: no rash, warm, dry  Heme/Lymph: no bruising  Psych: organized thought, normal behavior and affect      Lab Review:      Results from last 7 days   Lab Units 01/14/23  0503   SODIUM mmol/L 136   POTASSIUM mmol/L 4.3   CHLORIDE mmol/L 110*   CO2 mmol/L 11.1*   BUN mg/dL 10   CREATININE mg/dL 0.70   CALCIUM mg/dL 7.7*   BILIRUBIN mg/dL <0.2   ALK PHOS U/L 94   ALT (SGPT) U/L 28   AST (SGOT) U/L 21   GLUCOSE mg/dL 88     Results from last 7 days   Lab Units 01/14/23  0503   TROPONIN T ng/mL 0.240*         Results from last 7 days   Lab Units 01/14/23  0503 01/13/23  2316   INR  1.00 1.25*   APTT seconds 28.9 115.3*     Results from last 7 days   Lab Units 01/14/23  0503   MAGNESIUM mg/dL 1.9         I personally viewed and interpreted the patient's EKG/Telemetry data)    Patient Active Problem List   Diagnosis   • DKA (diabetic ketoacidosis) ([facility])     Assessment and Plan:  #DKA  #Elevated troponin  #Hypertension  #Hyperlipidemia    47-year-old woman current smoker  with diabetes, hypertension, hyperlipidemia who was transferred to [facility] for further management of DKA and elevated troponin.  EKG with normal sinus rhythm and no ischemic changes.  This is unlikely to be ACS and her elevated troponin is likely demand from her DKA however she clearly has risk factors for CAD. She has already been started on heparin drip which we can discontinue.    - Continue DKA treatment per primary team  - Echocardiogram  - Continue to trend troponins  - Once DKA has resolved, will decide on further ischemic work-up with possible stress test versus cath.      Santosh Tran MD  01/14/23  08:50 EST.

## 2023-01-14 NOTE — PROGRESS NOTES
Dr. DOUGLAS Espinosa    [facility] CARDIAC INTENSIVE CARE        Patient ID:  Name:  Xena Briseno  MRN:  [id number]  1975  47 y.o.  female            CC/Reason for visit: Elevated troponin, diabetic ketoacidosis    Interval hx: Called by ICU nurse to evaluate patient at bedside.  Patient appears acutely ill, has clearly a ketotic breath.  Having rapid respirations.  Tells me she stopped using insulin pump in October because she was frustrated, was not getting her accessories and equipment on time.  Says she has been checking her blood sugar 4-5 times per day since then and giving herself subcutaneous long-acting and short acting insulin but still documenting blood sugars in the 600s at home.  She was not calling 911 or seeking medical assistance.  Usually she would be found down by her family or friends who would then call for ambulance.  She has had poor diabetes control over the past couple of months.  Current labs show serum CO2 of 11 and the patient is clearly back into diabetic ketoacidosis.  Endocrinology notes reviewed from yesterday, when she was in the office.  She is clearly confused about her insulin dosing and does not know how much she is taking.  She says she is confused about her sliding scale and does not understand the instructions.  She has been to the emergency room 3 times over the past 4 months.  Clearly noncompliance with medical treatment.  Has had admissions for DKA and her friends indicate that every time she is discharged from the hospital she immediately ends up in the same situation.  I reviewed notes by ARACELI Miguel and Dr. Arias.    ROS: Positive for shortness of breath, thirst and polydipsia with polyuria.  Some chest soreness but no abdominal pain.  No fever    I reviewed old medical records.  Past medical history, social history and family history: Unchanged from admission H&P.      Vitals:  Vitals:    01/14/23 1000 01/14/23 1015 01/14/23 1045 01/14/23 1102    BP: 128/62 130/63 136/61    BP Location:       Patient Position:       Pulse: 93 94 103    Resp:       Temp:       TempSrc:       SpO2:    100%   Weight:       Height:               Body mass index is 22.86 kg/m².  No intake or output data in the 24 hours ending 01/14/23 1129    Exam:  GEN:  Appears acutely ill  Ketotic breath  Alert, oriented x 3.  Moves all 4 extremities on command without focal deficits  LUNGS: Clear breath sounds bilat, she is tachypneic with some Kussmaul breathing.  CV:  Normal S1S2, without murmur, no edema  ABD:  Non tender, no enlarged liver or masses      Scheduled meds:  baclofen, 5 mg, Oral, Q8H  pantoprazole, 40 mg, Oral, Q AM  sodium chloride, 10 mL, Intravenous, Q12H  vilazodone, 40 mg, Oral, Daily      IV meds:                      dextrose 5 % and sodium chloride 0.45 %, 150 mL/hr  dextrose 5 % and sodium chloride 0.45 % with KCl 20 mEq/L, 150 mL/hr, Last Rate: 150 mL/hr (01/14/23 0913)  dextrose 5 % and sodium chloride 0.45 % with KCl 40 mEq/L, 150 mL/hr  dextrose 5 % and sodium chloride 0.9 %, 150 mL/hr  dextrose 5 % and sodium chloride 0.9 % with KCl 20 mEq, 150 mL/hr  dextrose 5% and sodium chloride 0.9% with KCl 40 mEq/L, 150 mL/hr  heparin, 12 Units/kg/hr, Last Rate: Stopped (01/14/23 0917)  insulin, 0-100 Units/hr, Last Rate: 3.2 Units/hr (01/14/23 1115)  custom IV KCl infusion builder, 250 mL/hr  sodium chloride, 250 mL/hr  sodium chloride 0.45 % with KCl 20 mEq, 250 mL/hr  sodium chloride, 250 mL/hr  sodium chloride 0.9 % with KCl 20 mEq, 250 mL/hr  sodium chloride 0.9 % with KCl 40 mEq/L, 250 mL/hr        Data Review:   I reviewed the patient's medications and new clinical results.    No results found for: COVID19      Lab Results   Component Value Date    CALCIUM 7.7 (L) 01/14/2023    PHOS 1.2 (C) 01/14/2023    MG 1.9 01/14/2023    MG 2.2 01/13/2023    MG 2.1 01/13/2023     Results from last 7 days   Lab Units 01/14/23  0503 01/13/23  2316 01/13/23  1752   SODIUM mmol/L  136  --   --    POTASSIUM mmol/L 4.3  --   --    CHLORIDE mmol/L 110*  --   --    CO2 mmol/L 11.1*  --   --    BUN mg/dL 10  --   --    CREATININE mg/dL 0.70  --   --    CALCIUM mg/dL 7.7*  --   --    BILIRUBIN mg/dL <0.2  --   --    ALK PHOS U/L 94  --   --    ALT (SGPT) U/L 28  --   --    AST (SGOT) U/L 21  --   --    GLUCOSE mg/dL 88  --   --    INR  1.00 1.25*  --    PROCALCITONIN ng/mL  --   --  0.22             Results from last 7 days   Lab Units 01/14/23  0503   TROPONIN T ng/mL 0.240*         ASSESSMENT:     DKA (diabetic ketoacidosis) (HCC)  Non-STEMI, likely type II  Medical noncompliance  Current smoker  Leukocytosis  GERD  Hypertension  Chronic low back pain  Anxiety and depression  IBS      PLAN:  Patient and all problems new to me.  Labs are clearly showing metabolic acidosis and patient is slipping back into diabetic ketoacidosis.  Obtain venous blood gas and serum beta hydroxy butyrate ketone levels  Start insulin drip for DKA treatment.  Replace all electrolytes per DKA protocol.  Check blood sugar every hour and follow DKA insulin drip protocol.  Repeat renal labs every 4 hours.  Elevated troponin may be due to type II demand ischemia.  We will consult cardiology.  Troponin is elevated.  Repeat twelve-lead EKG now.  Repeat troponin in 4 hours.  Cardiology will deciding on heparin drip indication.  Low-dose Lovenox for now for DVT prophylaxis.    Total critical care time 38 minutes      Demetris Espinosa MD  1/14/2023

## 2023-01-15 ENCOUNTER — APPOINTMENT (OUTPATIENT)
Dept: CARDIOLOGY | Facility: HOSPITAL | Age: 48
DRG: 246 | End: 2023-01-15
Payer: MEDICARE

## 2023-01-15 LAB
ANION GAP SERPL CALCULATED.3IONS-SCNC: 11.5 MMOL/L (ref 5–15)
ANION GAP SERPL CALCULATED.3IONS-SCNC: 13.4 MMOL/L (ref 5–15)
AORTIC DIMENSIONLESS INDEX: 0.7 (DI)
BH CV ECHO MEAS - ACS: 1.77 CM
BH CV ECHO MEAS - AO MAX PG: 18.7 MMHG
BH CV ECHO MEAS - AO MEAN PG: 10 MMHG
BH CV ECHO MEAS - AO ROOT DIAM: 2.7 CM
BH CV ECHO MEAS - AO V2 MAX: 216 CM/SEC
BH CV ECHO MEAS - AO V2 VTI: 35.9 CM
BH CV ECHO MEAS - AVA(I,D): 2.15 CM2
BH CV ECHO MEAS - EDV(CUBED): 103.8 ML
BH CV ECHO MEAS - EDV(MOD-SP2): 53 ML
BH CV ECHO MEAS - EDV(MOD-SP4): 83 ML
BH CV ECHO MEAS - EF(MOD-BP): 64.2 %
BH CV ECHO MEAS - EF(MOD-SP2): 62.3 %
BH CV ECHO MEAS - EF(MOD-SP4): 65.1 %
BH CV ECHO MEAS - ESV(CUBED): 22.8 ML
BH CV ECHO MEAS - ESV(MOD-SP2): 20 ML
BH CV ECHO MEAS - ESV(MOD-SP4): 29 ML
BH CV ECHO MEAS - FS: 39.7 %
BH CV ECHO MEAS - IVS/LVPW: 0.89 CM
BH CV ECHO MEAS - IVSD: 0.8 CM
BH CV ECHO MEAS - LAT PEAK E' VEL: 15.3 CM/SEC
BH CV ECHO MEAS - LV DIASTOLIC VOL/BSA (35-75): 49.3 CM2
BH CV ECHO MEAS - LV MASS(C)D: 132.3 GRAMS
BH CV ECHO MEAS - LV MAX PG: 7.6 MMHG
BH CV ECHO MEAS - LV MEAN PG: 4 MMHG
BH CV ECHO MEAS - LV SYSTOLIC VOL/BSA (12-30): 17.2 CM2
BH CV ECHO MEAS - LV V1 MAX: 138 CM/SEC
BH CV ECHO MEAS - LV V1 VTI: 24.6 CM
BH CV ECHO MEAS - LVIDD: 4.7 CM
BH CV ECHO MEAS - LVIDS: 2.8 CM
BH CV ECHO MEAS - LVOT AREA: 3.1 CM2
BH CV ECHO MEAS - LVOT DIAM: 2 CM
BH CV ECHO MEAS - LVPWD: 0.9 CM
BH CV ECHO MEAS - MED PEAK E' VEL: 10.1 CM/SEC
BH CV ECHO MEAS - MR MAX PG: 85.7 MMHG
BH CV ECHO MEAS - MR MAX VEL: 462.9 CM/SEC
BH CV ECHO MEAS - MV A MAX VEL: 111 CM/SEC
BH CV ECHO MEAS - MV DEC SLOPE: 580.8 CM/SEC2
BH CV ECHO MEAS - MV DEC TIME: 0.2 MSEC
BH CV ECHO MEAS - MV E MAX VEL: 102 CM/SEC
BH CV ECHO MEAS - MV E/A: 0.92
BH CV ECHO MEAS - MV MAX PG: 5.8 MMHG
BH CV ECHO MEAS - MV MEAN PG: 2.8 MMHG
BH CV ECHO MEAS - MV P1/2T: 62.3 MSEC
BH CV ECHO MEAS - MV V2 VTI: 29.4 CM
BH CV ECHO MEAS - MVA(P1/2T): 3.5 CM2
BH CV ECHO MEAS - MVA(VTI): 2.6 CM2
BH CV ECHO MEAS - PA V2 MAX: 99.1 CM/SEC
BH CV ECHO MEAS - QP/QS: 0.56
BH CV ECHO MEAS - RAP SYSTOLE: 3 MMHG
BH CV ECHO MEAS - RV MAX PG: 2.17 MMHG
BH CV ECHO MEAS - RV V1 MAX: 73.7 CM/SEC
BH CV ECHO MEAS - RV V1 VTI: 16.6 CM
BH CV ECHO MEAS - RVOT DIAM: 1.83 CM
BH CV ECHO MEAS - RVSP: 15.8 MMHG
BH CV ECHO MEAS - SI(MOD-SP2): 19.6 ML/M2
BH CV ECHO MEAS - SI(MOD-SP4): 32.1 ML/M2
BH CV ECHO MEAS - SV(LVOT): 77.2 ML
BH CV ECHO MEAS - SV(MOD-SP2): 33 ML
BH CV ECHO MEAS - SV(MOD-SP4): 54 ML
BH CV ECHO MEAS - SV(RVOT): 43.5 ML
BH CV ECHO MEAS - TR MAX PG: 12.8 MMHG
BH CV ECHO MEAS - TR MAX VEL: 179.2 CM/SEC
BH CV ECHO MEASUREMENTS AVERAGE E/E' RATIO: 8.03
BH CV ECHO SHUNT ASSESSMENT PERFORMED (HIDDEN SCRIPTING): 1
BH CV XLRA - TDI S': 17 CM/SEC
BUN SERPL-MCNC: 4 MG/DL (ref 6–20)
BUN SERPL-MCNC: 7 MG/DL (ref 6–20)
BUN/CREAT SERPL: 7.4 (ref 7–25)
BUN/CREAT SERPL: 9.6 (ref 7–25)
CALCIUM SPEC-SCNC: 7.5 MG/DL (ref 8.6–10.5)
CALCIUM SPEC-SCNC: 8.1 MG/DL (ref 8.6–10.5)
CHLORIDE SERPL-SCNC: 104 MMOL/L (ref 98–107)
CHLORIDE SERPL-SCNC: 108 MMOL/L (ref 98–107)
CO2 SERPL-SCNC: 16.6 MMOL/L (ref 22–29)
CO2 SERPL-SCNC: 19.5 MMOL/L (ref 22–29)
CREAT SERPL-MCNC: 0.54 MG/DL (ref 0.57–1)
CREAT SERPL-MCNC: 0.73 MG/DL (ref 0.57–1)
DEPRECATED RDW RBC AUTO: 45.4 FL (ref 37–54)
EGFRCR SERPLBLD CKD-EPI 2021: 101.6 ML/MIN/1.73
EGFRCR SERPLBLD CKD-EPI 2021: 113.7 ML/MIN/1.73
ERYTHROCYTE [DISTWIDTH] IN BLOOD BY AUTOMATED COUNT: 12.5 % (ref 12.3–15.4)
GLUCOSE BLDC GLUCOMTR-MCNC: 116 MG/DL (ref 70–130)
GLUCOSE BLDC GLUCOMTR-MCNC: 122 MG/DL (ref 70–130)
GLUCOSE BLDC GLUCOMTR-MCNC: 133 MG/DL (ref 70–130)
GLUCOSE BLDC GLUCOMTR-MCNC: 168 MG/DL (ref 70–130)
GLUCOSE BLDC GLUCOMTR-MCNC: 170 MG/DL (ref 70–130)
GLUCOSE BLDC GLUCOMTR-MCNC: 206 MG/DL (ref 70–130)
GLUCOSE BLDC GLUCOMTR-MCNC: 254 MG/DL (ref 70–130)
GLUCOSE BLDC GLUCOMTR-MCNC: 351 MG/DL (ref 70–130)
GLUCOSE SERPL-MCNC: 146 MG/DL (ref 65–99)
GLUCOSE SERPL-MCNC: 204 MG/DL (ref 65–99)
HCT VFR BLD AUTO: 31.3 % (ref 34–46.6)
HGB BLD-MCNC: 10.4 G/DL (ref 12–15.9)
LEFT ATRIUM VOLUME INDEX: 32.5 ML/M2
MAXIMAL PREDICTED HEART RATE: 172 BPM
MCH RBC QN AUTO: 33.2 PG (ref 26.6–33)
MCHC RBC AUTO-ENTMCNC: 33.2 G/DL (ref 31.5–35.7)
MCV RBC AUTO: 100 FL (ref 79–97)
PLATELET # BLD AUTO: 234 10*3/MM3 (ref 140–450)
PMV BLD AUTO: 9.6 FL (ref 6–12)
POTASSIUM SERPL-SCNC: 3.5 MMOL/L (ref 3.5–5.2)
POTASSIUM SERPL-SCNC: 3.6 MMOL/L (ref 3.5–5.2)
QT INTERVAL: 365 MS
QT INTERVAL: 377 MS
RBC # BLD AUTO: 3.13 10*6/MM3 (ref 3.77–5.28)
SINUS: 2.49 CM
SODIUM SERPL-SCNC: 135 MMOL/L (ref 136–145)
SODIUM SERPL-SCNC: 138 MMOL/L (ref 136–145)
STRESS TARGET HR: 146 BPM
WBC NRBC COR # BLD: 9.9 10*3/MM3 (ref 3.4–10.8)

## 2023-01-15 PROCEDURE — 63710000001 INSULIN LISPRO (HUMAN) PER 5 UNITS

## 2023-01-15 PROCEDURE — 82962 GLUCOSE BLOOD TEST: CPT

## 2023-01-15 PROCEDURE — 80048 BASIC METABOLIC PNL TOTAL CA: CPT

## 2023-01-15 PROCEDURE — 80048 BASIC METABOLIC PNL TOTAL CA: CPT | Performed by: INTERNAL MEDICINE

## 2023-01-15 PROCEDURE — 85027 COMPLETE CBC AUTOMATED: CPT

## 2023-01-15 PROCEDURE — 99233 SBSQ HOSP IP/OBS HIGH 50: CPT | Performed by: STUDENT IN AN ORGANIZED HEALTH CARE EDUCATION/TRAINING PROGRAM

## 2023-01-15 PROCEDURE — 93306 TTE W/DOPPLER COMPLETE: CPT

## 2023-01-15 PROCEDURE — 93306 TTE W/DOPPLER COMPLETE: CPT | Performed by: INTERNAL MEDICINE

## 2023-01-15 PROCEDURE — 63710000001 INSULIN LISPRO (HUMAN) PER 5 UNITS: Performed by: INTERNAL MEDICINE

## 2023-01-15 RX ORDER — INSULIN LISPRO 100 [IU]/ML
0-14 INJECTION, SOLUTION INTRAVENOUS; SUBCUTANEOUS
Status: DISCONTINUED | OUTPATIENT
Start: 2023-01-15 | End: 2023-01-17 | Stop reason: HOSPADM

## 2023-01-15 RX ORDER — SODIUM CHLORIDE 9 MG/ML
100 INJECTION, SOLUTION INTRAVENOUS CONTINUOUS
Status: DISCONTINUED | OUTPATIENT
Start: 2023-01-15 | End: 2023-01-17 | Stop reason: HOSPADM

## 2023-01-15 RX ORDER — DIAZEPAM 2 MG/1
2 TABLET ORAL EVERY 6 HOURS PRN
Status: DISCONTINUED | OUTPATIENT
Start: 2023-01-15 | End: 2023-01-15

## 2023-01-15 RX ORDER — DIAZEPAM 2 MG/1
2 TABLET ORAL EVERY 12 HOURS PRN
Status: DISCONTINUED | OUTPATIENT
Start: 2023-01-15 | End: 2023-01-16

## 2023-01-15 RX ORDER — INSULIN LISPRO 100 [IU]/ML
0-14 INJECTION, SOLUTION INTRAVENOUS; SUBCUTANEOUS EVERY 4 HOURS
Status: DISCONTINUED | OUTPATIENT
Start: 2023-01-15 | End: 2023-01-15

## 2023-01-15 RX ORDER — HYDROCODONE BITARTRATE AND ACETAMINOPHEN 5; 325 MG/1; MG/1
1 TABLET ORAL EVERY 6 HOURS PRN
Status: COMPLETED | OUTPATIENT
Start: 2023-01-15 | End: 2023-01-15

## 2023-01-15 RX ORDER — BACLOFEN 10 MG/1
5 TABLET ORAL EVERY 12 HOURS PRN
Status: DISCONTINUED | OUTPATIENT
Start: 2023-01-15 | End: 2023-01-17 | Stop reason: HOSPADM

## 2023-01-15 RX ORDER — DEXTROSE MONOHYDRATE 25 G/50ML
25 INJECTION, SOLUTION INTRAVENOUS
Status: DISCONTINUED | OUTPATIENT
Start: 2023-01-15 | End: 2023-01-17 | Stop reason: HOSPADM

## 2023-01-15 RX ORDER — NICOTINE POLACRILEX 4 MG
15 LOZENGE BUCCAL
Status: DISCONTINUED | OUTPATIENT
Start: 2023-01-15 | End: 2023-01-17 | Stop reason: HOSPADM

## 2023-01-15 RX ADMIN — PANTOPRAZOLE SODIUM 40 MG: 40 TABLET, DELAYED RELEASE ORAL at 06:13

## 2023-01-15 RX ADMIN — INSULIN GLARGINE-YFGN 15 UNITS: 100 INJECTION, SOLUTION SUBCUTANEOUS at 20:51

## 2023-01-15 RX ADMIN — HYDROCODONE BITARTRATE AND ACETAMINOPHEN 1 TABLET: 5; 325 TABLET ORAL at 10:38

## 2023-01-15 RX ADMIN — INSULIN GLARGINE-YFGN 15 UNITS: 100 INJECTION, SOLUTION SUBCUTANEOUS at 01:12

## 2023-01-15 RX ADMIN — DIAZEPAM 2 MG: 2 TABLET ORAL at 01:35

## 2023-01-15 RX ADMIN — INSULIN LISPRO 12 UNITS: 100 INJECTION, SOLUTION INTRAVENOUS; SUBCUTANEOUS at 12:34

## 2023-01-15 RX ADMIN — HYDROCODONE BITARTRATE AND ACETAMINOPHEN 1 TABLET: 5; 325 TABLET ORAL at 20:51

## 2023-01-15 RX ADMIN — INSULIN LISPRO 3 UNITS: 100 INJECTION, SOLUTION INTRAVENOUS; SUBCUTANEOUS at 16:38

## 2023-01-15 RX ADMIN — INSULIN LISPRO 5 UNITS: 100 INJECTION, SOLUTION INTRAVENOUS; SUBCUTANEOUS at 07:50

## 2023-01-15 RX ADMIN — SODIUM CHLORIDE 100 ML/HR: 9 INJECTION, SOLUTION INTRAVENOUS at 00:58

## 2023-01-15 RX ADMIN — DIAZEPAM 2 MG: 2 TABLET ORAL at 20:51

## 2023-01-15 RX ADMIN — INSULIN LISPRO 3 UNITS: 100 INJECTION, SOLUTION INTRAVENOUS; SUBCUTANEOUS at 20:51

## 2023-01-15 RX ADMIN — SODIUM CHLORIDE 100 ML/HR: 9 INJECTION, SOLUTION INTRAVENOUS at 13:02

## 2023-01-15 NOTE — PROGRESS NOTES
Dr. DOUGLAS Espinosa    [facility] CARDIAC INTENSIVE CARE        Patient ID:  Name:  Xena Briseno  MRN:  [id number]  1975  47 y.o.  female            CC/Reason for visit: Diabetic ketoacidosis, elevated troponin    Interval hx: Patient has very poor medical literacy.  Does not understand diabetes mellitus.  She has poor insight into her medical condition.  She is not sure why she is continuing to have trouble with her insulin management at home.  Her labs show that her DKA has resolved.  She is advancing oral diet slowly    ROS: No chest pain, abdominal pain    Vitals:  Vitals:    01/15/23 0700 01/15/23 0800 01/15/23 0900 01/15/23 1000   BP: 105/63 109/98 99/56 104/59   BP Location: Right arm      Patient Position: Lying      Pulse: 86 94 99 99   Resp:       Temp:  97.8 °F (36.6 °C)     TempSrc:  Oral     SpO2: 100%  100% 100%   Weight:       Height:               Body mass index is 22.86 kg/m².    Intake/Output Summary (Last 24 hours) at 1/15/2023 1112  Last data filed at 1/15/2023 0600  Gross per 24 hour   Intake 1718.68 ml   Output 1050 ml   Net 668.68 ml       Exam:  GEN:  No distress  Alert, oriented x 3.   LUNGS: Clear breath sounds bilat, no use of accessory muscles  CV:  Normal S1S2, without murmur, no edema  ABD:  Non tender, no enlarged liver or masses      Scheduled meds:  insulin glargine, 15 Units, Subcutaneous, Nightly  insulin lispro, 0-14 Units, Subcutaneous, 4x Daily With Meals & Nightly  pantoprazole, 40 mg, Oral, Q AM  sodium chloride, 10 mL, Intravenous, Q12H      IV meds:                      sodium chloride, 100 mL/hr, Last Rate: 100 mL/hr (01/15/23 0058)        Data Review:   I reviewed the patient's medications and new clinical results.    No results found for: COVID19      Lab Results   Component Value Date    CALCIUM 7.5 (L) 01/15/2023    PHOS 2.5 01/14/2023    MG 2.0 01/14/2023    MG 2.5 01/14/2023    MG 1.8 01/14/2023     Results from last 7 days   Lab Units 01/15/23  1664  01/14/23  2320 01/14/23  2048 01/14/23  1211 01/14/23  0503 01/13/23  2316 01/13/23  1752   SODIUM mmol/L 138 136 133*   < > 136  --   --    POTASSIUM mmol/L 3.5 3.8 5.0   < > 4.3  --   --    CHLORIDE mmol/L 108* 108* 109*   < > 110*  --   --    CO2 mmol/L 16.6* 17.0* 16.0*   < > 11.1*  --   --    BUN mg/dL 4* 5* 5*   < > 10  --   --    CREATININE mg/dL 0.54* 0.59 0.59   < > 0.70  --   --    CALCIUM mg/dL 7.5* 7.5* 7.3*   < > 7.7*  --   --    BILIRUBIN mg/dL  --   --   --   --  <0.2  --   --    ALK PHOS U/L  --   --   --   --  94  --   --    ALT (SGPT) U/L  --   --   --   --  28  --   --    AST (SGOT) U/L  --   --   --   --  21  --   --    GLUCOSE mg/dL 146* 159* 355*   < > 88  --   --    WBC 10*3/mm3 9.90  --   --   --   --   --   --    HEMOGLOBIN g/dL 10.4*  --   --   --   --   --   --    PLATELETS 10*3/mm3 234  --   --   --   --   --   --    INR   --   --   --   --  1.00 1.25*  --    PROCALCITONIN ng/mL  --   --   --   --   --   --  0.22    < > = values in this interval not displayed.             ASSESSMENT:     DKA (diabetic ketoacidosis) (HCC)  Non-STEMI, likely type II  Medical noncompliance  Current smoker  Leukocytosis  GERD  Hypertension  Chronic low back pain  Anxiety and depression  IBS  Medical illiteracy      PLAN:  Ketoacidosis is resolved.  Started on low-dose subcutaneous insulin.  Started on high-dose short acting insulin sliding scale.  Oral diet is being advanced.  Transfer out of ICU today.  She needs to meet with diabetes educator/nutritionist.  Unfortunately she has very poor medical literacy, and has poor understanding and insight of her disease state.  She has been having difficulty at home controlling her blood sugars, not understanding her sliding scale.  Cardiology is following along for elevated troponin.  They have ordered echocardiogram.  The plan is for left heart catheterization tomorrow.          Demetris Espinosa MD  1/15/2023

## 2023-01-15 NOTE — PROGRESS NOTES
"    Patient Name: Xena Briseno  :1975  47 y.o.      Patient Care Team:  Kim Aguillon as PCP - General (Nurse Practitioner)  Jaquelin Grover APRN as Nurse Practitioner (Endocrinology)    Chief Complaint:   DKA    Interval History:   Transitioned to subcutaneous insulin overnight.  This morning feels okay but tearful. When asked if she has had any chest pain, she is unclear and becomes tearful.      Objective   Vital Signs  Temp:  [97.8 °F (36.6 °C)-99.2 °F (37.3 °C)] 97.8 °F (36.6 °C)  Heart Rate:  [] 86  Resp:  [14] 14  BP: ()/(49-72) 105/63    Intake/Output Summary (Last 24 hours) at 1/15/2023 0944  Last data filed at 1/15/2023 0600  Gross per 24 hour   Intake 1718.68 ml   Output 1050 ml   Net 668.68 ml     Flowsheet Rows    Flowsheet Row First Filed Value   Admission Height 165.1 cm (65\") Documented at 2023 09   Admission Weight 62.3 kg (137 lb 5.6 oz) Documented at 2023 0928          GEN: no distress, alert and oriented  HEENT: NACT, EOMI, moist mucous membranes  Lungs: CTAB, no wheezes, rales or rhonchi  CV: normal rate, regular rhythm, normal S1, S2, no murmurs, +2 radial pulses b/l, no carotid bruit  Abdomen: soft, nontender, nondistended, NABS  Extremities: no edema  Skin: no rash, warm, dry  Heme/Lymph: no bruising  Psych: organized thought, normal behavior and affect    Results Review:    Results from last 7 days   Lab Units 01/15/23  0418   SODIUM mmol/L 138   POTASSIUM mmol/L 3.5   CHLORIDE mmol/L 108*   CO2 mmol/L 16.6*   BUN mg/dL 4*   CREATININE mg/dL 0.54*   GLUCOSE mg/dL 146*   CALCIUM mg/dL 7.5*     Results from last 7 days   Lab Units 23  0503   TROPONIN T ng/mL 0.240*     Results from last 7 days   Lab Units 01/15/23  0418   WBC 10*3/mm3 9.90   HEMOGLOBIN g/dL 10.4*   HEMATOCRIT % 31.3*   PLATELETS 10*3/mm3 234     Results from last 7 days   Lab Units 23  0503 23  2316   INR  1.00 1.25*   APTT seconds 28.9 115.3*     Results from " last 7 days   Lab Units 01/14/23  2320   MAGNESIUM mg/dL 2.0                   Medication Review:   insulin glargine, 15 Units, Subcutaneous, Nightly  insulin lispro, 0-14 Units, Subcutaneous, 4x Daily With Meals & Nightly  pantoprazole, 40 mg, Oral, Q AM  sodium chloride, 10 mL, Intravenous, Q12H         sodium chloride, 100 mL/hr, Last Rate: 100 mL/hr (01/15/23 0058)        Assessment & Plan   #DKA  #Elevated troponin  #Hypertension  #Hyperlipidemia     47-year-old woman current smoker with diabetes, hypertension, hyperlipidemia who was transferred to  for further management of DKA and elevated troponin.  EKG with normal sinus rhythm and no ischemic changes.  This is unlikely to be ACS and her elevated troponin is likely demand from her DKA however she clearly has risk factors for CAD. She has already been started on heparin drip which we can discontinue.     - Continue DKA treatment per primary team, now on subcu insulin  - Follow-up echocardiogram  - Continue to trend troponins  - Plan for left heart cath tomorrow for further evaluation of elevated troponins    Santosh Tran MD  [facility]  01/15/23  09:44 EST

## 2023-01-15 NOTE — NURSING NOTE
Pt transitioned to subcutaneous insulin, SS and lantus. Insulin gtt and other fluids d/c @ 0215 with exception of NS@100.  BG within normal limits, most electrolytes corrected. Po intake fine, up OOB to bsc, on RA - neuro intact. No adverse events, VSS.

## 2023-01-15 NOTE — PROGRESS NOTES
Pulmonary/critical care progress note    Patient meet criteria for transitioning to subcutaneous insulin with her midnight lab check.  Orders for 15 units Lantus ordered, SSI, diabetic diet, and IVF placed.

## 2023-01-16 PROBLEM — R79.89 ELEVATED TROPONIN: Status: ACTIVE | Noted: 2023-01-14

## 2023-01-16 PROBLEM — R77.8 ELEVATED TROPONIN: Status: ACTIVE | Noted: 2023-01-14

## 2023-01-16 LAB
ALBUMIN SERPL-MCNC: 3 G/DL (ref 3.5–5.2)
ALBUMIN/GLOB SERPL: 1.8 G/DL
ALP SERPL-CCNC: 93 U/L (ref 39–117)
ALT SERPL W P-5'-P-CCNC: 27 U/L (ref 1–33)
ANION GAP SERPL CALCULATED.3IONS-SCNC: 9 MMOL/L (ref 5–15)
AST SERPL-CCNC: 23 U/L (ref 1–32)
BASOPHILS # BLD AUTO: 0.02 10*3/MM3 (ref 0–0.2)
BASOPHILS NFR BLD AUTO: 0.4 % (ref 0–1.5)
BILIRUB SERPL-MCNC: <0.2 MG/DL (ref 0–1.2)
BUN SERPL-MCNC: 9 MG/DL (ref 6–20)
BUN/CREAT SERPL: 16.1 (ref 7–25)
CALCIUM SPEC-SCNC: 7.8 MG/DL (ref 8.6–10.5)
CHLORIDE SERPL-SCNC: 106 MMOL/L (ref 98–107)
CO2 SERPL-SCNC: 24 MMOL/L (ref 22–29)
CREAT SERPL-MCNC: 0.56 MG/DL (ref 0.57–1)
DEPRECATED RDW RBC AUTO: 42 FL (ref 37–54)
EGFRCR SERPLBLD CKD-EPI 2021: 112.7 ML/MIN/1.73
EOSINOPHIL # BLD AUTO: 0.08 10*3/MM3 (ref 0–0.4)
EOSINOPHIL NFR BLD AUTO: 1.5 % (ref 0.3–6.2)
ERYTHROCYTE [DISTWIDTH] IN BLOOD BY AUTOMATED COUNT: 12.2 % (ref 12.3–15.4)
GLOBULIN UR ELPH-MCNC: 1.7 GM/DL
GLUCOSE BLDC GLUCOMTR-MCNC: 144 MG/DL (ref 70–130)
GLUCOSE BLDC GLUCOMTR-MCNC: 300 MG/DL (ref 70–130)
GLUCOSE BLDC GLUCOMTR-MCNC: 306 MG/DL (ref 70–130)
GLUCOSE BLDC GLUCOMTR-MCNC: 91 MG/DL (ref 70–130)
GLUCOSE SERPL-MCNC: 311 MG/DL (ref 65–99)
HCT VFR BLD AUTO: 32.4 % (ref 34–46.6)
HGB BLD-MCNC: 10.9 G/DL (ref 12–15.9)
IMM GRANULOCYTES # BLD AUTO: 0.03 10*3/MM3 (ref 0–0.05)
IMM GRANULOCYTES NFR BLD AUTO: 0.6 % (ref 0–0.5)
LYMPHOCYTES # BLD AUTO: 1.95 10*3/MM3 (ref 0.7–3.1)
LYMPHOCYTES NFR BLD AUTO: 35.8 % (ref 19.6–45.3)
MCH RBC QN AUTO: 32.4 PG (ref 26.6–33)
MCHC RBC AUTO-ENTMCNC: 33.6 G/DL (ref 31.5–35.7)
MCV RBC AUTO: 96.4 FL (ref 79–97)
MONOCYTES # BLD AUTO: 0.74 10*3/MM3 (ref 0.1–0.9)
MONOCYTES NFR BLD AUTO: 13.6 % (ref 5–12)
NEUTROPHILS NFR BLD AUTO: 2.63 10*3/MM3 (ref 1.7–7)
NEUTROPHILS NFR BLD AUTO: 48.1 % (ref 42.7–76)
NRBC BLD AUTO-RTO: 0 /100 WBC (ref 0–0.2)
PLATELET # BLD AUTO: 253 10*3/MM3 (ref 140–450)
PMV BLD AUTO: 9.8 FL (ref 6–12)
POTASSIUM SERPL-SCNC: 3.5 MMOL/L (ref 3.5–5.2)
PROT SERPL-MCNC: 4.7 G/DL (ref 6–8.5)
RBC # BLD AUTO: 3.36 10*6/MM3 (ref 3.77–5.28)
SODIUM SERPL-SCNC: 139 MMOL/L (ref 136–145)
WBC NRBC COR # BLD: 5.45 10*3/MM3 (ref 3.4–10.8)

## 2023-01-16 PROCEDURE — 63710000001 INSULIN LISPRO (HUMAN) PER 5 UNITS: Performed by: STUDENT IN AN ORGANIZED HEALTH CARE EDUCATION/TRAINING PROGRAM

## 2023-01-16 PROCEDURE — C1894 INTRO/SHEATH, NON-LASER: HCPCS | Performed by: STUDENT IN AN ORGANIZED HEALTH CARE EDUCATION/TRAINING PROGRAM

## 2023-01-16 PROCEDURE — C1769 GUIDE WIRE: HCPCS | Performed by: STUDENT IN AN ORGANIZED HEALTH CARE EDUCATION/TRAINING PROGRAM

## 2023-01-16 PROCEDURE — 99222 1ST HOSP IP/OBS MODERATE 55: CPT | Performed by: PSYCHIATRY & NEUROLOGY

## 2023-01-16 PROCEDURE — 85347 COAGULATION TIME ACTIVATED: CPT

## 2023-01-16 PROCEDURE — 0 IOPAMIDOL PER 1 ML: Performed by: STUDENT IN AN ORGANIZED HEALTH CARE EDUCATION/TRAINING PROGRAM

## 2023-01-16 PROCEDURE — C1725 CATH, TRANSLUMIN NON-LASER: HCPCS | Performed by: STUDENT IN AN ORGANIZED HEALTH CARE EDUCATION/TRAINING PROGRAM

## 2023-01-16 PROCEDURE — 93458 L HRT ARTERY/VENTRICLE ANGIO: CPT | Performed by: STUDENT IN AN ORGANIZED HEALTH CARE EDUCATION/TRAINING PROGRAM

## 2023-01-16 PROCEDURE — C9600 PERC DRUG-EL COR STENT SING: HCPCS | Performed by: STUDENT IN AN ORGANIZED HEALTH CARE EDUCATION/TRAINING PROGRAM

## 2023-01-16 PROCEDURE — C1874 STENT, COATED/COV W/DEL SYS: HCPCS | Performed by: STUDENT IN AN ORGANIZED HEALTH CARE EDUCATION/TRAINING PROGRAM

## 2023-01-16 PROCEDURE — 25010000002 FENTANYL CITRATE (PF) 50 MCG/ML SOLUTION: Performed by: STUDENT IN AN ORGANIZED HEALTH CARE EDUCATION/TRAINING PROGRAM

## 2023-01-16 PROCEDURE — 92928 PRQ TCAT PLMT NTRAC ST 1 LES: CPT | Performed by: STUDENT IN AN ORGANIZED HEALTH CARE EDUCATION/TRAINING PROGRAM

## 2023-01-16 PROCEDURE — 85025 COMPLETE CBC W/AUTO DIFF WBC: CPT | Performed by: INTERNAL MEDICINE

## 2023-01-16 PROCEDURE — 027034Z DILATION OF CORONARY ARTERY, ONE ARTERY WITH DRUG-ELUTING INTRALUMINAL DEVICE, PERCUTANEOUS APPROACH: ICD-10-PCS | Performed by: STUDENT IN AN ORGANIZED HEALTH CARE EDUCATION/TRAINING PROGRAM

## 2023-01-16 PROCEDURE — B2111ZZ FLUOROSCOPY OF MULTIPLE CORONARY ARTERIES USING LOW OSMOLAR CONTRAST: ICD-10-PCS | Performed by: STUDENT IN AN ORGANIZED HEALTH CARE EDUCATION/TRAINING PROGRAM

## 2023-01-16 PROCEDURE — 63710000001 INSULIN LISPRO (HUMAN) PER 5 UNITS: Performed by: INTERNAL MEDICINE

## 2023-01-16 PROCEDURE — 82962 GLUCOSE BLOOD TEST: CPT

## 2023-01-16 PROCEDURE — 25010000002 HEPARIN (PORCINE) PER 1000 UNITS: Performed by: STUDENT IN AN ORGANIZED HEALTH CARE EDUCATION/TRAINING PROGRAM

## 2023-01-16 PROCEDURE — 25010000002 MIDAZOLAM PER 1 MG: Performed by: STUDENT IN AN ORGANIZED HEALTH CARE EDUCATION/TRAINING PROGRAM

## 2023-01-16 PROCEDURE — 4A023N7 MEASUREMENT OF CARDIAC SAMPLING AND PRESSURE, LEFT HEART, PERCUTANEOUS APPROACH: ICD-10-PCS | Performed by: STUDENT IN AN ORGANIZED HEALTH CARE EDUCATION/TRAINING PROGRAM

## 2023-01-16 PROCEDURE — C1887 CATHETER, GUIDING: HCPCS | Performed by: STUDENT IN AN ORGANIZED HEALTH CARE EDUCATION/TRAINING PROGRAM

## 2023-01-16 PROCEDURE — B2151ZZ FLUOROSCOPY OF LEFT HEART USING LOW OSMOLAR CONTRAST: ICD-10-PCS | Performed by: STUDENT IN AN ORGANIZED HEALTH CARE EDUCATION/TRAINING PROGRAM

## 2023-01-16 PROCEDURE — 99233 SBSQ HOSP IP/OBS HIGH 50: CPT | Performed by: STUDENT IN AN ORGANIZED HEALTH CARE EDUCATION/TRAINING PROGRAM

## 2023-01-16 PROCEDURE — 80053 COMPREHEN METABOLIC PANEL: CPT | Performed by: INTERNAL MEDICINE

## 2023-01-16 DEVICE — XIENCE SKYPOINT™ EVEROLIMUS ELUTING CORONARY STENT SYSTEM 2.50 MM X 18 MM / RAPID-EXCHANGE
Type: IMPLANTABLE DEVICE | Site: CORONARY | Status: FUNCTIONAL
Brand: XIENCE SKYPOINT™

## 2023-01-16 RX ORDER — FENTANYL CITRATE 50 UG/ML
INJECTION, SOLUTION INTRAMUSCULAR; INTRAVENOUS
Status: DISCONTINUED | OUTPATIENT
Start: 2023-01-16 | End: 2023-01-16 | Stop reason: HOSPADM

## 2023-01-16 RX ORDER — HEPARIN SODIUM 1000 [USP'U]/ML
INJECTION, SOLUTION INTRAVENOUS; SUBCUTANEOUS
Status: DISCONTINUED | OUTPATIENT
Start: 2023-01-16 | End: 2023-01-16 | Stop reason: HOSPADM

## 2023-01-16 RX ORDER — ASPIRIN 81 MG/1
81 TABLET ORAL DAILY
Status: DISCONTINUED | OUTPATIENT
Start: 2023-01-16 | End: 2023-01-17 | Stop reason: HOSPADM

## 2023-01-16 RX ORDER — ACETAMINOPHEN 325 MG/1
650 TABLET ORAL EVERY 4 HOURS PRN
Status: DISCONTINUED | OUTPATIENT
Start: 2023-01-16 | End: 2023-01-17 | Stop reason: HOSPADM

## 2023-01-16 RX ORDER — VERAPAMIL HYDROCHLORIDE 2.5 MG/ML
INJECTION, SOLUTION INTRAVENOUS
Status: DISCONTINUED | OUTPATIENT
Start: 2023-01-16 | End: 2023-01-16 | Stop reason: HOSPADM

## 2023-01-16 RX ORDER — NICARDIPINE HYDROCHLORIDE 2.5 MG/ML
INJECTION INTRAVENOUS
Status: DISCONTINUED | OUTPATIENT
Start: 2023-01-16 | End: 2023-01-16 | Stop reason: HOSPADM

## 2023-01-16 RX ORDER — SODIUM CHLORIDE 9 MG/ML
INJECTION, SOLUTION INTRAVENOUS
Status: COMPLETED | OUTPATIENT
Start: 2023-01-16 | End: 2023-01-16

## 2023-01-16 RX ORDER — ASPIRIN 325 MG
TABLET ORAL
Status: DISCONTINUED | OUTPATIENT
Start: 2023-01-16 | End: 2023-01-16 | Stop reason: HOSPADM

## 2023-01-16 RX ORDER — MIDAZOLAM HYDROCHLORIDE 1 MG/ML
INJECTION INTRAMUSCULAR; INTRAVENOUS
Status: DISCONTINUED | OUTPATIENT
Start: 2023-01-16 | End: 2023-01-16 | Stop reason: HOSPADM

## 2023-01-16 RX ORDER — LIDOCAINE HYDROCHLORIDE 20 MG/ML
INJECTION, SOLUTION INFILTRATION; PERINEURAL
Status: DISCONTINUED | OUTPATIENT
Start: 2023-01-16 | End: 2023-01-16 | Stop reason: HOSPADM

## 2023-01-16 RX ADMIN — INSULIN GLARGINE-YFGN 15 UNITS: 100 INJECTION, SOLUTION SUBCUTANEOUS at 20:44

## 2023-01-16 RX ADMIN — TICAGRELOR 90 MG: 90 TABLET ORAL at 20:43

## 2023-01-16 RX ADMIN — INSULIN LISPRO 10 UNITS: 100 INJECTION, SOLUTION INTRAVENOUS; SUBCUTANEOUS at 16:08

## 2023-01-16 RX ADMIN — PANTOPRAZOLE SODIUM 40 MG: 40 TABLET, DELAYED RELEASE ORAL at 04:22

## 2023-01-16 RX ADMIN — INSULIN LISPRO 10 UNITS: 100 INJECTION, SOLUTION INTRAVENOUS; SUBCUTANEOUS at 08:53

## 2023-01-16 RX ADMIN — Medication 10 ML: at 20:44

## 2023-01-16 RX ADMIN — METOPROLOL TARTRATE 12.5 MG: 25 TABLET, FILM COATED ORAL at 20:44

## 2023-01-16 RX ADMIN — METOPROLOL TARTRATE 12.5 MG: 25 TABLET, FILM COATED ORAL at 11:33

## 2023-01-16 RX ADMIN — Medication 10 ML: at 08:54

## 2023-01-16 RX ADMIN — ACETAMINOPHEN 650 MG: 325 TABLET, FILM COATED ORAL at 20:43

## 2023-01-16 NOTE — CASE MANAGEMENT/SOCIAL WORK
Discharge Planning Assessment  [facility]     Patient Name: Xena Briseno  MRN: [id number]  Today's Date: 1/16/2023    Admit Date: 1/14/2023    Plan: Home;  May need transportation/taxi home.   Discharge Needs Assessment     Row Name 01/16/23 1757       Living Environment    People in Home spouse    Name(s) of People in Home Baldo Briseno, spouse and one dog    Current Living Arrangements apartment    Primary Care Provided by self    Provides Primary Care For pet(s)  1 DOG    Family Caregiver if Needed spouse    Family Caregiver Names Baldo, spouse    Quality of Family Relationships helpful;supportive    Able to Return to Prior Arrangements yes       Resource/Environmental Concerns    Resource/Environmental Concerns reliable transportation    Transportation Concerns other (see comments)  Voiced she may not have reliable transport home to [facility].       Transition Planning    Patient/Family Anticipates Transition to home with family    Patient/Family Anticipated Services at Transition none    Transportation Anticipated other (see comments)  Voiced she may not have reliable transport home to [facility].       Discharge Needs Assessment    Equipment Currently Used at Home glucometer    Concerns to be Addressed discharge planning    Anticipated Changes Related to Illness none    Equipment Needed After Discharge none               Discharge Plan     Row Name 01/16/23 1800       Plan    Plan Home;  May need transportation/taxi home.    Plan Comments CCP spoke with patient at bedside to complete her discharge screening assessment.  CCP introduced self and role.  Of note:  Pt tearful and rather slow to answer CCP questions this day. Pt confirmed information on her face sheet.  Pt reports she is IADL’s, unemployed and drives.  Pt reports she lives with spouse, Baldo Briseno in first floor apartment in [place], [place].  Pt sent to [facility] from [facility].  Pt reports she uses a glucometer  at home.  Pt reports PCP is, Kim Aguillon.  Pt confirmed pharmacy is, [facility].  Pt denies past home health or going to a sub-acute rehab.  Pt plans to return home at discharge but reports her family may not be able to transport her home.  Pt tearful about possibly not having a ride home and reports she has no monies to pay for her transport.  CCP encouraged Pt to not cry and that arrangements would be made to get her home if her family and friends cannot pick her up.  CCP will continue to follow - Denise COLLINS /GINA CM.              Continued Care and Services - Admitted Since 1/14/2023    Coordination has not been started for this encounter.       Expected Discharge Date and Time     Expected Discharge Date Expected Discharge Time    Jan 18, 2023          Demographic Summary     Row Name 01/16/23 1756       General Information    Admission Type inpatient    Arrived From hospital  From [facility]    Referral Source admission list;nursing    Reason for Consult transportation    Preferred Language English               Functional Status     Row Name 01/16/23 1753       Functional Status, IADL    Medications independent    Meal Preparation independent    Housekeeping independent    Laundry independent    Shopping independent       Mental Status    General Appearance WDL WDL       Mental Status Summary    Recent Changes in Mental Status/Cognitive Functioning no changes       Employment/    Employment Status unemployed               Psychosocial     Row Name 01/16/23 1756       Emotion Mood WDL    Emotion/Mood/Affect WDL affect    Affect tearful       Speech WDL    Speech WDL WDL               Abuse/Neglect    No documentation.                Legal    No documentation.                Substance Abuse    No documentation.                Patient Forms    No documentation.                   Denise Vela RN

## 2023-01-16 NOTE — PLAN OF CARE
Problem: Adult Inpatient Plan of Care  Goal: Plan of Care Review  Outcome: Ongoing, Progressing  Flowsheets (Taken 1/16/2023 0617)  Progress: improving  Plan of Care Reviewed With: patient  Outcome Evaluation: A&Justyn, DANDY, needs more education on diabetes, no complaints of any chest discomfort, up with standby assist, IV fluids infusing, npo for cath today, plan of care continues.

## 2023-01-16 NOTE — PAYOR COMM NOTE
"Xena Reaves (47 y.o. Female)       ATTN: REQUESTING INPATIENT AUTHORIZATION: ID# [id number]    PLEASE REPLY TO [facility] DEPT: [phone],  [phone]    [facility]: NPI [id number]        Date of Birth   1975    Social Security Number       Address   [address] [place], [place] [address]    Home Phone   [phone]    MRN   [id number]       [place]   [facility]    Marital Status   Single                            Admission Date   1/14/23    INPATIENT      Admission Type   Urgent    Admitting Provider   Demetris Espinosa MD    Attending Provider   Demetris Espinosa MD    Department, Room/Bed   [address], 2207/1       Discharge Date       Discharge Disposition       Discharge Destination                               Attending Provider: Demetris Espinosa MD    Allergies: Statins    Isolation: None   Infection: None   Code Status: CPR    Ht: 165.1 cm (65\")   Wt: 61.8 kg (136 lb 3.9 oz)    Admission Cmt: None   Principal Problem: DKA (diabetic ketoacidosis) (HCC) [E11.10]                 Active Insurance as of 1/14/2023     Primary Coverage     Payor Plan Insurance Group Employer/Plan Group    [facility] MEDICARE REPLACEMENT [facility]      Payor Plan Address Payor Plan Phone Number Payor Plan Fax Number Effective Dates    [address] [address] [phone]  4/1/2022 - None Entered    [facility] [address]       Subscriber Name Subscriber Birth Date Member ID       XENA REAVES 1975 [id number]                   Emergency Contacts      (Rel.) Home Phone Work Phone Mobile Phone    YANG REAVES (Spouse) [phone] -- [phone]    va abbasi (Daughter) -- -- [phone]    AMOS ABAD (Other) [phone] -- --               History & Physical      Antonia Woodson, APRN at 01/14/23 7118     Attestation signed by Becky Arias MD at 01/14/23 6336 (Updated)    I have reviewed this documentation and " agree.    Assessment:  • DKA  • Brittle IDDM type 1: A1c 9.8.  Was on insulin pump but switched to long-acting and short-acting insulin couple of months ago  • Leukocytosis, WBC 17.9, likely stress-induced  • NSTEMI, probably type II.  EKG no ischemic changes  • ? COPD, no current exacerbation  • Current tobacco abuse (35 pack/years)    • GERD  • Hyperlipidemia  • Hypertension  • Chronic back pain  • Anxiety/depression  • IBS  • Medical noncompliance    • Plan:  Insulin drip per DKA protocol  • Serial BMP  • IV hydration  • Resume home antidepressive but hold Valium for now.  • Heparin drip. Consult cardiology    I provided a substantive portion of the care of this patient. I personally provided more than half of the total time dedicated to the treatment of this patient.  Total CC time 61 min. I spent 32 min and NP spent 29 min.                          HISTORY AND PHYSICAL    Patient Identification:  Xena Briseno  47 y.o.  female  1975  [id number]            CC: DKA    History of Present Illness:  Xena Briseno is a 47-year-old female with a history of diabetes mellitus, hyperlipidemia, hypertension, and chronic back pain.  She is a current every day smoker, one pack/day since she was 12 years old (35 pack/years).    She has had multiple recent hospitalizations (3-4 in a matter of weeks) due to DKA. Patient reports she had a hypoglycemic event in December related to a defective insulin pump, and since then, she has not been wearing her insulin pump.     Patient was originally evaluated on 1/13/2023 by her endocrinologist for complaints of elevated glucose, diarrhea, nausea and vomiting.  She also complains of chest pain.  Blood glucose was over 500 and patient was referred to the ED at [facility] for further evaluation.  ED evaluation at the outside hospital included a urinalysis revealing ketonuria, arterial pH of 7.2, serum bicarb of 10, anion gap of 32, and elevated glucose.  Additionally, patient had an  elevated troponin with an EKG showing sinus rhythm with PACs.   She received a total of 3 L of normal saline and was started on insulin drip.  She was also started on a heparin drip for NSTEMI with her elevated troponin.    Patient was transferred to [facility] for further management of DKA and NSTEMI.    Review of Systems:  Constitutional: Positive for fatigue, activity change.  Negative for fevers or chills.  HEENT: Positive for congestion, postnasal drip, rhinorrhea.  Negative for eye discharge, itching or pain.  Respiratory: Positive for chest tightness, shortness of breath.  Negative for apnea, choking, cough or wheezing.  Cardiac: Positive for intermittent substernal chest pain, worse with exertion, leg swelling and palpitations.  GI: Positive for abdominal pain, nausea vomiting and diarrhea.  Endocrine: Positive for polydipsia, polyphagia, polyuria.  : Negative for dysuria, frequency or urgency.  Musculoskeletal: Negative for arthralgias or myalgias.  Positive for chronic back discomfort.  Skin: Negative for color change, rash, pallor, or wound.  Neurological: Negative for dizziness, lightheadedness, headache, syncope or weakness.    Past Medical History:  Past Medical History:   Diagnosis Date   • Anxiety    • Arthritis    • Asthma    • Depression    • Diabetes mellitus type I (HCC)    • GERD (gastroesophageal reflux disease)    • Hyperlipidemia    • Hypertension    • Sleep apnea        Past Surgical History:  Past Surgical History:   Procedure Laterality Date   •  SECTION     • CHOLECYSTECTOMY     • ENDOSCOPY     • SPINE SURGERY          Home Meds:  Medications Prior to Admission   Medication Sig Dispense Refill Last Dose   • Accu-Chek Guide test strip TEST FOUR TIMES A  each 5    • Accu-Chek Softclix Lancets lancets 102 each by Other route 4 (Four) Times a Day. Use as instructed 102 each 5    • albuterol sulfate  (90 Base) MCG/ACT inhaler albuterol sulfate HFA 90  mcg/actuation aerosol inhaler      • baclofen (LIORESAL) 10 MG tablet baclofen 10 mg tablet      • cetirizine (zyrTEC) 10 MG tablet cetirizine 10 mg tablet      • diazePAM (VALIUM) 2 MG tablet diazepam 2 mg tablet      • fluconazole (DIFLUCAN) 150 MG tablet fluconazole 150 mg tablet      • fluticasone (FLONASE) 50 MCG/ACT nasal spray fluticasone propionate 50 mcg/actuation nasal spray,suspension      • glucagon (GlucaGen HypoKit) 1 MG injection GlucaGen HypoKit 1 mg Injection      • glucose blood test strip 50 each by Other route As Needed. Use as instructed      • HYDROcodone-acetaminophen (NORCO) 5-325 MG per tablet hydrocodone 5 mg-acetaminophen 325 mg tablet      • ibuprofen (ADVIL,MOTRIN) 600 MG tablet ibuprofen 600 mg tablet      • Insulin Lispro 100 UNIT/ML solution cartridge Inject  under the skin into the appropriate area as directed.      • linaclotide (LINZESS) 145 MCG capsule capsule Linzess 145 mcg capsule   Take 1 capsule every day by oral route as needed.      • lisinopril (PRINIVIL,ZESTRIL) 20 MG tablet lisinopril 20 mg tablet      • NovoLOG 100 UNIT/ML injection INJECT 65 UNITS AS DIRECTED EVERY DAY 20 mL 3    • omeprazole (priLOSEC) 40 MG capsule omeprazole 40 mg capsule,delayed release      • ondansetron (ZOFRAN) 8 MG tablet ondansetron HCl 8 mg tablet      • vilazodone (VIIBRYD) 40 MG tablet tablet Viibryd 40 mg tablet          Allergies:  Allergies   Allergen Reactions   • Statins Angioedema       Social History:   Social History     Socioeconomic History   • Marital status: Single   • Number of children: 2   Tobacco Use   • Smoking status: Every Day   • Smokeless tobacco: Never   Vaping Use   • Vaping Use: Never used   Substance and Sexual Activity   • Alcohol use: Not Currently   • Drug use: Never   • Sexual activity: Defer       Family History:  Family History   Problem Relation Age of Onset   • Heart disease Mother    • Diabetes Father    • Diabetes Brother    • Stroke Paternal Grandmother         Physical Exam:  There were no vitals taken for this visit. There is no height or weight on file to calculate BMI.        Physical Exam:  Constitutional: Alert, normally appearing female.  Appears stated age.  Does not appear in acute distress.  Head: Normocephalic, atraumatic.  Mouth/throat: Dry oral mucous membranes.  Eyes: Pupils are equal round and reactive to light, injected conjunctivae.  Neck: Supple, no JVD, trachea midline, no palpable cervical lymphadenopathy.  Cardiovascular: Normal rate, regular rhythm, no murmur or gallop  Pulmonary: Normal respiratory effort, no respiratory distress, lung sounds are clear and equal bilaterally, chest expansion symmetrical, no wheezes stridor or rales.  Abdominal: Flat, soft, vague generalized tenderness.  : Not assessed.  Musculoskeletal: No swelling or tenderness over joint spaces, no bilateral lower extremity edema.  Skin: Cool, dry, mottled and bilateral lower extremity edema, capillary refill 3 seconds.  Neurological: No focal deficit, alert and oriented x3.  No sensory or motor deficit appreciated on exam.    LABS:  No results found for: COVID19    No results found for: CALCIUM, PHOS  Results from last 7 days   Lab Units 01/13/23  2316 01/13/23  1752   MAGNESIUM mg/dL 2.2 2.1   PROCALCITONIN ng/mL  --  0.22     No results found for: CKTOTAL, CKMB, CKMBINDEX, TROPONINI, TROPONINT          Results from last 7 days   Lab Units 01/13/23  1752   PROCALCITONIN ng/mL 0.22             Results from last 7 days   Lab Units 01/13/23  2316   INR  1.25*         Lab Results   Component Value Date    TSH 1.316 01/13/2023     CrCl cannot be calculated (No successful lab value found.).         Imaging: I personally visualized the images of scans/x-rays performed within last 3 days.      Assessment:  1. Diabetic ketoacidosis  2. Leukocytosis, WBC 17.9  3. NSTEMI  4. ? COPD  5. Current tobacco abuse (35 pack/years)  6. GERD  7. Hyperlipidemia  8. Hypertension  9. Chronic back  pain  10. Anxiety/depression  11. Medical noncompliance    Recommendations:  · Admit patient to the ICU for management of DKA and NSTEMI.  · Stat labs ordered: Lactic acid, CMP, CBC.   · DKA protocol ordered including insulin drip, every hour Accu-Cheks, and IV fluids.  · WBC at outside hospital was 17.9, no identifiable source of infection and patient afebrile.  Leukocytosis likely reactive, we will continue to monitor, no indication for antibiotics at this time.  · NSTEMI, likely stress induced ischemia-troponin was elevated at outside facility.  A stat troponin and stat EKG was ordered.  Heparin drip was initiated per ACS protocol at the outside facility, continue for now.  Consult cardiology for elevated troponin and continued complaints of chest discomfort.  · Current tobacco abuse/COPD: Patient denies ever seeing a pulmonologist for her COPD, however is on Spiriva at home for maintenance therapy with as needed albuterol for wheezing.  Counseled patient on necessity for tobacco cessation.  · Diabetes educator consulted.  Patient has a history of medical noncompliance with not using her insulin pump as prescribed, and has a reported education deficit with how to use sliding scale insulin.  Will definitely need reinforcement prior to discharge.  · Keep patient NPO for now, patient is able to have sips of water/noncaloric beverages.  · Hold antihypertensive medications for now, as needed hydralazine ordered for SBP greater than 160.  · As needed Zofran for nausea.  Home dose of baclofen ordered for patient's chronic back pain/muscle spasms.    Discussed with patient the serious nature of repeated admissions for DKA and increased risk of complications with uncontrolled diabetes.  Answer questions the best of my abilities.  Patient is a full code at this time.    Patient was placed in face mask upon entering room and kept mask on throughout our encounter. I wore full protective equipment throughout this patient  encounter including a face mask, gown and gloves. Hand hygiene was performed before donning protective equipment and after removal when leaving the room.    Antonia Woodson, APRN  1/14/2023  04:39 EST      Much of this encounter note is an electronic transcription/translation of spoken language to printed text using Dragon Software.    Electronically signed by Becky Arias MD at 01/14/23 0736       Emergency Department Notes    No notes of this type exist for this encounter.

## 2023-01-16 NOTE — PLAN OF CARE
Goal Outcome Evaluation:           Progress: improving  Outcome Evaluation: VSS, AOx4. Stable upon admission to unit post CATH. Right radial site is clean, dry, soft. No issues, small amount of old drainage on gauze post radial band removal. No new complaints at this time. Diabetes educator has seen patient. Neurology has seen patient and ordered CT scan of head without contrast. Patient has been tearful today, worried about transportation home. TM.

## 2023-01-16 NOTE — PROGRESS NOTES
Dr. DOUGLAS Espinosa    [address] [facility]        Patient ID:  Name:  Xena Briseno  MRN:  [id number]  1975  47 y.o.  female            CC/Reason for visit: Acute coronary syndrome, diabetic ketoacidosis    Interval hx: Patient underwent coronary angiogram earlier this morning and had stent placement.    ROS: No chest pain, abdominal    Vitals:  Vitals:    01/16/23 1133 01/16/23 1145 01/16/23 1200 01/16/23 1230   BP: 127/75 131/77 127/74 130/77   BP Location:       Patient Position:       Pulse: 82 80 86 78   Resp:       Temp:       TempSrc:       SpO2:  100% 100% 100%   Weight:       Height:               Body mass index is 22.67 kg/m².    Intake/Output Summary (Last 24 hours) at 1/16/2023 1242  Last data filed at 1/16/2023 0800  Gross per 24 hour   Intake 360 ml   Output 500 ml   Net -140 ml       Exam:  GEN:  No distress  Alert, oriented x 3.   LUNGS: Clear breath sounds bilat, no use of accessory muscles  CV:  Normal S1S2, without murmur, no edema  ABD:  Non tender, no enlarged liver or masses      Scheduled meds:  aspirin, 81 mg, Oral, Daily  insulin glargine, 15 Units, Subcutaneous, Nightly  insulin lispro, 0-14 Units, Subcutaneous, 4x Daily With Meals & Nightly  metoprolol tartrate, 12.5 mg, Oral, Q12H  pantoprazole, 40 mg, Oral, Q AM  sodium chloride, 10 mL, Intravenous, Q12H  ticagrelor, 90 mg, Oral, BID      IV meds:                      sodium chloride, 100 mL/hr, Last Rate: 100 mL/hr (01/15/23 1302)        Data Review:   I reviewed the patient's medications and new clinical results.    No results found for: COVID19      Lab Results   Component Value Date    CALCIUM 7.8 (L) 01/16/2023    PHOS 2.5 01/14/2023    MG 2.0 01/14/2023    MG 2.5 01/14/2023    MG 1.8 01/14/2023     Results from last 7 days   Lab Units 01/16/23  0515 01/15/23  1609 01/15/23  0418 01/14/23  1211 01/14/23  0503 01/13/23  8936 01/13/23  7992   SODIUM mmol/L 139 135* 138   < > 136  --   --    POTASSIUM mmol/L 3.5 3.6  3.5   < > 4.3  --   --    CHLORIDE mmol/L 106 104 108*   < > 110*  --   --    CO2 mmol/L 24.0 19.5* 16.6*   < > 11.1*  --   --    BUN mg/dL 9 7 4*   < > 10  --   --    CREATININE mg/dL 0.56* 0.73 0.54*   < > 0.70  --   --    CALCIUM mg/dL 7.8* 8.1* 7.5*   < > 7.7*  --   --    BILIRUBIN mg/dL <0.2  --   --   --  <0.2  --   --    ALK PHOS U/L 93  --   --   --  94  --   --    ALT (SGPT) U/L 27  --   --   --  28  --   --    AST (SGOT) U/L 23  --   --   --  21  --   --    GLUCOSE mg/dL 311* 204* 146*   < > 88  --   --    WBC 10*3/mm3 5.45  --  9.90  --   --   --   --    HEMOGLOBIN g/dL 10.9*  --  10.4*  --   --   --   --    PLATELETS 10*3/mm3 253  --  234  --   --   --   --    INR   --   --   --   --  1.00 1.25*  --    PROCALCITONIN ng/mL  --   --   --   --   --   --  0.22    < > = values in this interval not displayed.             Results from last 7 days   Lab Units 01/14/23  0503   TROPONIN T ng/mL 0.240*     Results from last 7 days   Lab Units 01/14/23  1237   MODALITY  Room Air   O2 SATURATION CALC % 80.2*       Estimated Creatinine Clearance: 119.9 mL/min (A) (by C-G formula based on SCr of 0.56 mg/dL (L)).      ASSESSMENT:   DKA (diabetic ketoacidosis) (HCC)  Non-STEMI, likely type II  Medical noncompliance  Current smoker  Leukocytosis  GERD  Hypertension  Chronic low back pain  Anxiety and depression  IBS  Poor medical literacy      PLAN:  Patient does not understand her disease.  She does not understand coronary disease or diabetes.  She again has very poor medical literacy.  Needs diabetic education.  She needs to go over the significance of coronary artery disease, coronary stenting, and management of heart disease as well as diabetes.  She needs to meet with nurse navigator from cardiology and diabetes education.  Cardiology managing medications for her recent placed coronary stent today.  Continue insulin and sliding scale, which she will have to review again with nurse educator for diabetes.  Hopefully  discharge tomorrow afternoon if okay with cardiology.  Will need case management to assist with scheduling appointments with endocrinologist as soon as possible as outpatient.  This patient has had numerous hospital admissions over the past 4 months due to poor medical literacy and lack of understanding of diabetes        Demetris Espinosa MD  1/16/2023

## 2023-01-16 NOTE — CONSULTS
"Diabetes Education  Assessment/Teaching    Patient Name:  Xena Briseno  YOB: 1975  MRN: [id number]  Admit Date:  1/14/2023      Assessment Date:  1/16/2023  Flowsheet Row Most Recent Value   General Information     Referral From: MD estrada   Height 165.1 cm (65\")   Weight 61.8 kg (136 lb 3.9 oz)   Weight Method Standing scale   Diabetes History    What type of diabetes do you have? Type 1   Length of Diabetes Diagnosis 10 + years  [Pt states since she was 16 years old.  Pt states she is told she is \"brittle\".]   Current DM knowledge fair   Have you had diabetes education/teaching in the past? yes   When and where [facility] your diabetes [facility]   Do you test your blood sugar at home? yes   Frequency of checks 4-5 times a day per meter memory recall   Meter type True Metrix   Who performs the test? self   Typical readings 78-300s from the 12th-15th of January   Have you had low blood sugar? (<70mg/dl) no   Have you had high blood sugar? (>140mg/dl) yes   How often do you have high blood sugar? frequently   Education Preferences    Barriers to Learning other (comment)  [Pt having difficulty answering questions, often stating she doesn't remember]   Assessment Topics    Reducing Risk - Assessment Needs education   DM Goals    Taking Medication - Goal 0-30 days from discharge   Reducing Risk - Goal 30-90 days from discharge   Contact Plan Follow-up medical care          Flowsheet Row Most Recent Value   DM Education Needs    Meter Has own   Meter Type Other (comment)  [True Metrix]   Frequency of Testing 4 times a day   Reducing Risks A1C testing  [A1c 9.8%]   Discharge Plan Follow-up with endocrinolgoist, Home        Other Comments:  Pt assessed for diabetes home routine.  Pt was able to answer that she takes Lantus in the AM and Humalog  AC TID at home but was unable to tell me at what doses.  Pt admits to missing Lantus 1-2 times a week and could not recall how often she " "takes Humalog.  Pt states she takes Humalog per sliding scale based on carb intake but could not recall her ICR.  Pt states she is having memory issues and could not even answer how long she has been ill.      Notes reviewed from pt's last Endo visit as well as notes from RN and GISELLA ODELL outpatient education session.  Pt states she has a Medtronic 770G insulin pump with Novolog (42 units/day per APRN notes) but stated it's been off for months.  Pt states she doesn't recall when it was stopped and when she was switched to MDI.      Review of chart per Epic, pt's A1c for 2022 was in the 7% range.  Her last pump/CGM notes from Endo indicates her glucose average was 177 mg/dL and TIR was 56%.    Unsure of cause of her memory issue.  Pt states she has issues when she gets in the \"state\".  Unclear pt's baseline as pt states others have pointed out her memory issues and pt had an appointment with a neurologist but did not get to that appointment.      Will follow up in the AM prior to discharge and will attempt to speak to spouse if possible.  Above relayed to RN and MD.      Electronically signed by:  Alona Coley RN, [facility]  01/16/23 15:09 EST  "

## 2023-01-16 NOTE — CONSULTS
This consult was requested of Yanira Espinosa MD thank you for involving me in the care of this patient.        As you well know this is a 47-year-old female with diabetic ketoacidosis hypertension hyperlipidemia and an elevated troponin who was admitted for diabetic ketoacidosis.  She states that over the last couple days she feels like her thinking has not been as sharp.  She denies any focal neurologic deficits but she states that ever since she stopped her insulin pump she has felt confused and not quite herself.  She denies any seizures there is been no fever over the last 24 hours.        Past medical history    Uncontrolled diabetes    Hypertension    Hyperlipidemia        Family history    Noncontributory        On examination today she is alert and oriented x3.  She can answer most questions appropriately if given enough time.  Cranial nerves II through XII are intact.  She has 5 out of 5 strength in bilateral upper and lower extremities with normal tone and bulk.  Her reflexes are 2+ throughout but toes are downgoing there was no Morris's no clonus.  There is no ataxia finger-nose or heel-to-shin.  Sensations intact throughout.  No focal neurologic deficits on examination this afternoon.        In summary this a very pleasant 47-year-old female her encephalopathy is likely multifactorial form her diabetic ketoacidosis which is improving.  I would recommend a CT of the head.  If she were to remain encephalopathic after her diabetes is well controlled we may need to consider an MRI and EEG but I think this is most likely toxic metabolic encephalopathy secondary to her diabetic ketoacidosis.        Thank you again for involving me in the care of this patient.

## 2023-01-17 ENCOUNTER — READMISSION MANAGEMENT (OUTPATIENT)
Dept: CALL CENTER | Facility: HOSPITAL | Age: 48
End: 2023-01-17
Payer: MEDICARE

## 2023-01-17 ENCOUNTER — APPOINTMENT (OUTPATIENT)
Dept: CT IMAGING | Facility: HOSPITAL | Age: 48
DRG: 246 | End: 2023-01-17
Payer: MEDICARE

## 2023-01-17 VITALS
BODY MASS INDEX: 22.88 KG/M2 | WEIGHT: 137.3 LBS | HEIGHT: 65 IN | DIASTOLIC BLOOD PRESSURE: 52 MMHG | SYSTOLIC BLOOD PRESSURE: 111 MMHG | TEMPERATURE: 98.3 F | RESPIRATION RATE: 18 BRPM | OXYGEN SATURATION: 96 % | HEART RATE: 92 BPM

## 2023-01-17 PROBLEM — I21.4 NSTEMI (NON-ST ELEVATED MYOCARDIAL INFARCTION): Status: ACTIVE | Noted: 2023-01-17

## 2023-01-17 PROBLEM — Z95.5 S/P DRUG ELUTING CORONARY STENT PLACEMENT: Status: ACTIVE | Noted: 2023-01-17

## 2023-01-17 LAB
ACT BLD: 179 SECONDS (ref 82–152)
ANION GAP SERPL CALCULATED.3IONS-SCNC: 6 MMOL/L (ref 5–15)
BASOPHILS # BLD AUTO: 0.02 10*3/MM3 (ref 0–0.2)
BASOPHILS NFR BLD AUTO: 0.4 % (ref 0–1.5)
BUN SERPL-MCNC: 7 MG/DL (ref 6–20)
BUN/CREAT SERPL: 14.6 (ref 7–25)
CALCIUM SPEC-SCNC: 7.9 MG/DL (ref 8.6–10.5)
CHLORIDE SERPL-SCNC: 104 MMOL/L (ref 98–107)
CHOLEST SERPL-MCNC: 167 MG/DL (ref 0–200)
CO2 SERPL-SCNC: 28 MMOL/L (ref 22–29)
CREAT SERPL-MCNC: 0.48 MG/DL (ref 0.57–1)
DEPRECATED RDW RBC AUTO: 46.7 FL (ref 37–54)
EGFRCR SERPLBLD CKD-EPI 2021: 117 ML/MIN/1.73
EOSINOPHIL # BLD AUTO: 0.02 10*3/MM3 (ref 0–0.4)
EOSINOPHIL NFR BLD AUTO: 0.4 % (ref 0.3–6.2)
ERYTHROCYTE [DISTWIDTH] IN BLOOD BY AUTOMATED COUNT: 12.9 % (ref 12.3–15.4)
FOLATE SERPL-MCNC: 2.14 NG/ML (ref 4.78–24.2)
GLUCOSE BLDC GLUCOMTR-MCNC: 263 MG/DL (ref 70–130)
GLUCOSE BLDC GLUCOMTR-MCNC: 296 MG/DL (ref 70–130)
GLUCOSE BLDC GLUCOMTR-MCNC: 302 MG/DL (ref 70–130)
GLUCOSE SERPL-MCNC: 356 MG/DL (ref 65–99)
HCT VFR BLD AUTO: 34.7 % (ref 34–46.6)
HDLC SERPL-MCNC: 38 MG/DL (ref 40–60)
HGB BLD-MCNC: 11.2 G/DL (ref 12–15.9)
IMM GRANULOCYTES # BLD AUTO: 0.02 10*3/MM3 (ref 0–0.05)
IMM GRANULOCYTES NFR BLD AUTO: 0.4 % (ref 0–0.5)
LDLC SERPL CALC-MCNC: 111 MG/DL (ref 0–100)
LDLC/HDLC SERPL: 2.9 {RATIO}
LYMPHOCYTES # BLD AUTO: 1.31 10*3/MM3 (ref 0.7–3.1)
LYMPHOCYTES NFR BLD AUTO: 25.1 % (ref 19.6–45.3)
MCH RBC QN AUTO: 32.7 PG (ref 26.6–33)
MCHC RBC AUTO-ENTMCNC: 32.3 G/DL (ref 31.5–35.7)
MCV RBC AUTO: 101.2 FL (ref 79–97)
MONOCYTES # BLD AUTO: 0.52 10*3/MM3 (ref 0.1–0.9)
MONOCYTES NFR BLD AUTO: 10 % (ref 5–12)
NEUTROPHILS NFR BLD AUTO: 3.32 10*3/MM3 (ref 1.7–7)
NEUTROPHILS NFR BLD AUTO: 63.7 % (ref 42.7–76)
NRBC BLD AUTO-RTO: 0 /100 WBC (ref 0–0.2)
PLATELET # BLD AUTO: 230 10*3/MM3 (ref 140–450)
PMV BLD AUTO: 9.8 FL (ref 6–12)
POTASSIUM SERPL-SCNC: 3.4 MMOL/L (ref 3.5–5.2)
QT INTERVAL: 390 MS
RBC # BLD AUTO: 3.43 10*6/MM3 (ref 3.77–5.28)
SODIUM SERPL-SCNC: 138 MMOL/L (ref 136–145)
TRIGL SERPL-MCNC: 94 MG/DL (ref 0–150)
VIT B12 BLD-MCNC: 1558 PG/ML (ref 211–946)
VLDLC SERPL-MCNC: 18 MG/DL (ref 5–40)
WBC NRBC COR # BLD: 5.21 10*3/MM3 (ref 3.4–10.8)

## 2023-01-17 PROCEDURE — 85025 COMPLETE CBC W/AUTO DIFF WBC: CPT | Performed by: STUDENT IN AN ORGANIZED HEALTH CARE EDUCATION/TRAINING PROGRAM

## 2023-01-17 PROCEDURE — 70450 CT HEAD/BRAIN W/O DYE: CPT

## 2023-01-17 PROCEDURE — 82175 ASSAY OF ARSENIC: CPT | Performed by: INTERNAL MEDICINE

## 2023-01-17 PROCEDURE — 83655 ASSAY OF LEAD: CPT | Performed by: INTERNAL MEDICINE

## 2023-01-17 PROCEDURE — 90791 PSYCH DIAGNOSTIC EVALUATION: CPT

## 2023-01-17 PROCEDURE — 93005 ELECTROCARDIOGRAM TRACING: CPT | Performed by: STUDENT IN AN ORGANIZED HEALTH CARE EDUCATION/TRAINING PROGRAM

## 2023-01-17 PROCEDURE — 82300 ASSAY OF CADMIUM: CPT | Performed by: INTERNAL MEDICINE

## 2023-01-17 PROCEDURE — 99233 SBSQ HOSP IP/OBS HIGH 50: CPT | Performed by: STUDENT IN AN ORGANIZED HEALTH CARE EDUCATION/TRAINING PROGRAM

## 2023-01-17 PROCEDURE — 82962 GLUCOSE BLOOD TEST: CPT

## 2023-01-17 PROCEDURE — 80048 BASIC METABOLIC PNL TOTAL CA: CPT | Performed by: STUDENT IN AN ORGANIZED HEALTH CARE EDUCATION/TRAINING PROGRAM

## 2023-01-17 PROCEDURE — 80061 LIPID PANEL: CPT | Performed by: STUDENT IN AN ORGANIZED HEALTH CARE EDUCATION/TRAINING PROGRAM

## 2023-01-17 PROCEDURE — 93010 ELECTROCARDIOGRAM REPORT: CPT | Performed by: INTERNAL MEDICINE

## 2023-01-17 PROCEDURE — 63710000001 INSULIN LISPRO (HUMAN) PER 5 UNITS: Performed by: STUDENT IN AN ORGANIZED HEALTH CARE EDUCATION/TRAINING PROGRAM

## 2023-01-17 PROCEDURE — 99233 SBSQ HOSP IP/OBS HIGH 50: CPT | Performed by: NURSE PRACTITIONER

## 2023-01-17 PROCEDURE — 83825 ASSAY OF MERCURY: CPT | Performed by: INTERNAL MEDICINE

## 2023-01-17 RX ORDER — INSULIN ASPART 100 [IU]/ML
0-14 INJECTION, SOLUTION INTRAVENOUS; SUBCUTANEOUS
Qty: 15 ML | Refills: 12 | Status: SHIPPED | OUTPATIENT
Start: 2023-01-17 | End: 2023-01-17 | Stop reason: SDUPTHER

## 2023-01-17 RX ORDER — ASPIRIN 81 MG/1
81 TABLET ORAL DAILY
Qty: 30 TABLET | Refills: 3 | Status: SHIPPED | OUTPATIENT
Start: 2023-01-18

## 2023-01-17 RX ORDER — PEN NEEDLE, DIABETIC 32GX 5/32"
1 NEEDLE, DISPOSABLE MISCELLANEOUS AS NEEDED
Qty: 100 EACH | Refills: 0 | Status: SHIPPED | OUTPATIENT
Start: 2023-01-17 | End: 2023-01-18 | Stop reason: SDUPTHER

## 2023-01-17 RX ORDER — INSULIN ASPART 100 [IU]/ML
0-14 INJECTION, SOLUTION INTRAVENOUS; SUBCUTANEOUS
Qty: 15 ML | Refills: 12 | Status: SHIPPED | OUTPATIENT
Start: 2023-01-17 | End: 2023-01-17

## 2023-01-17 RX ORDER — INSULIN ASPART 100 [IU]/ML
0-14 INJECTION, SOLUTION INTRAVENOUS; SUBCUTANEOUS
Qty: 15 ML | Refills: 12 | Status: SHIPPED | OUTPATIENT
Start: 2023-01-17

## 2023-01-17 RX ADMIN — METOPROLOL TARTRATE 12.5 MG: 25 TABLET, FILM COATED ORAL at 08:09

## 2023-01-17 RX ADMIN — POTASSIUM CHLORIDE 40 MEQ: 750 TABLET, EXTENDED RELEASE ORAL at 06:49

## 2023-01-17 RX ADMIN — INSULIN LISPRO 10 UNITS: 100 INJECTION, SOLUTION INTRAVENOUS; SUBCUTANEOUS at 06:49

## 2023-01-17 RX ADMIN — INSULIN LISPRO 8 UNITS: 100 INJECTION, SOLUTION INTRAVENOUS; SUBCUTANEOUS at 12:32

## 2023-01-17 RX ADMIN — Medication 10 ML: at 08:11

## 2023-01-17 RX ADMIN — POTASSIUM CHLORIDE 40 MEQ: 750 TABLET, EXTENDED RELEASE ORAL at 12:32

## 2023-01-17 RX ADMIN — TICAGRELOR 90 MG: 90 TABLET ORAL at 08:09

## 2023-01-17 RX ADMIN — PANTOPRAZOLE SODIUM 40 MG: 40 TABLET, DELAYED RELEASE ORAL at 02:02

## 2023-01-17 RX ADMIN — ASPIRIN 81 MG: 81 TABLET, COATED ORAL at 08:09
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
## 2023-01-17 NOTE — PROGRESS NOTES
[facility] Clinical Pharmacy Services: Pharmacy Education - Post PCI Discharge Medication    Xena Briseno was counseled over post-PCI medications prior to discharge. Counseling points included the followin) Ticagrelor's indication, patient's need for the medication, and dosing/frequency  2) Enforced the importance of taking ticagrelor/aspirin as instructed every day  3) Explained possible side effects of ticagrelor/aspirin therapy, including increased risk of bleeding, s/sx of bleeding, and increase in cold intolerance. Also talked about ways to control bleeding for minor cuts and scrapes.  4) Discussed all important drug interactions, including over-the-counter medications.    Explained risk for thrombosis on new stent (especially in the first 3-6 months) and medication compliance. Patient was concerned about compliance because she forgets to take her medication. I told her to call and report if she could not take her Brilinta twice daily, and we could change to another medication.     Patient expressed understanding and had no further questions. I do fear that there are still some comprehension problems and that there will be information that will not be retained after counseling.     Marcella Whaley, Pharm.D., [facility]   Clinical Staff Pharmacist   Phone Extension #[phone]

## 2023-01-17 NOTE — ACP (ADVANCE CARE PLANNING)
Provided living will to patient. Patient being d/c today and is very overwhelmed. Discussed living will and gave patient this 's telephone number if she wanted to call with questions.

## 2023-01-17 NOTE — PROGRESS NOTES
[facility] Clinical Pharmacy Services: National Cardiology Data Registry (NCDR) Medication Review    Xena Briseno is s/p PCI with drug-eluting stent placement for NSTEMI. Pharmacy to review discharge medications to make sure appropriate medications have been prescribed.    Patient has been discharged on the following:  · Aspirin EC 81 mg daily  · High Intensity Statin: Not prescribed  · Beta-blocker: Metoprolol 12.5 mg BID  · P2Y12 Inhibitor: Ticagrelor 90 mg BID  · LVEF=62% (no requirement for ACE/ARB)    Patient cannot be prescribed statins as she has angioedema when she has taken them in the past.    These medications meet the requirements for NCDR discharge medication for chest pain and MI.    Marcella Whaley, Pharm.D., [facility]  Clinical Pharmacist  Phone Extension #[phone]

## 2023-01-17 NOTE — CONSULTS
Provided phase II information along with the contact information for cardiac rehab  at [facility] . Requested for referral to be sent.   2

## 2023-01-17 NOTE — DISCHARGE SUMMARY
Patient Identification:  Name: eXna Briseno  Age: 47 y.o.  Sex: female  :  1975  MRN: [id number]  Primary Care Physician: Kim Aguillno    Admit date: 2023  Discharge date and time: 2023  Discharged Condition: good    Discharge Diagnoses:    S/P drug eluting coronary stent placement    DKA (diabetic ketoacidosis) ([facility])    NSTEMI (non-ST elevated myocardial infarction) ([facility])  Multivessel coronary artery disease  Medical noncompliance  Current smoker  Leukocytosis  GERD  Hypertension  Chronic low back pain  Anxiety and depression  Poor tolerance to statins due to myositis, fatigue and muscle pain  Poor medical literacy  Diabetes mellitus type 2 on subcutaneous insulin      Hospital Course: Xena Briseno presented to [facility] transferred from outside hospital for management of diabetic ketoacidosis.  She also had elevated troponin and was admitted to the intensive care unit.  There her DKA was treated with insulin intravenously, following DKA protocol.  Cardiology was consulted upon arrival and once her DKA improved, they took her to cardiac catheterization which showed multivessel coronary disease.  She did undergo drug-eluting stent placement to obtuse marginal coronary artery branch lesion.  She was started on aspirin, Brilinta and beta-blockers.  She has a history of intolerance to statins, muscle weakness and soreness.  She was found to be having difficulty with memory.  Neurology was consulted for evaluation.  From a review of history this patient has had numerous admissions to the hospital over the past 4 months with repeating episodes of diabetic ketoacidosis.  She does not have good understanding of her disease and she has poor medical literacy.  According to her friends and family she had been having difficulty with her insulin sliding scale at home and her blood sugars were always running in the 500 or 600.  She was seen here in the hospital by  diabetes educator as well as cardiology nurse navigator.  All her outpatient appointments were confirmed for her to follow-up with primary care, endocrinology and cardiology.  Head CT scan was obtained showing normal results, no clear explanation for her poor memory and poor cognition.  Neurology attributed her confusion to DKA and metabolic encephalopathy.  They have not recommended any follow-up as outpatient with neurology.  Due to her encephalopathy and her poor memory we stopped most of her home medications including benzodiazepines.  She has not manifested any withdrawal symptoms from benzodiazepines.  We recommend not restarting benzodiazepines as an outpatient due to her poor memory.  She needs cognitive testing as an outpatient and close follow-up with her primary care physician  She has maximally benefited from acute inpatient care and is ready for discharge home today.      Consults:   IP CONSULT TO CARDIOLOGY  IP CONSULT TO ADVANCE CARE PLANNING  IP CONSULT TO DIABETES EDUCATOR  IP CONSULT TO CASE MANAGEMENT   IP CONSULT TO NEUROLOGY  IP CONSULT TO ACCESS CENTER    Significant Diagnostic Studies:   Imaging Results (Most Recent)     Procedure Component Value Units Date/Time    CT Head Without Contrast [[id number]] Collected: 01/17/23 1153     Updated: 01/17/23 1153    Narrative:      NONCONTRAST HEAD CT 01/17/2023     CLINICAL HISTORY: Patient has delirium.     TECHNIQUE: Spiral CT images were obtained from the base of the skull to  the vertex without intravenous contrast. Images were reformatted and  submitted in 3 mm thick axial sagittal and coronal CT sections with  brain algorithm.     There are no prior studies from [facility] for  comparison.     FINDINGS: The brain parenchyma is normal in attenuation. The ventricles  are normal in size. I see no focal mass effect. There is no midline  shift. No extra-axial fluid collections are identified. There is no  evidence of acute  intracranial hemorrhage. The calvarium and the skull  base are normal in appearance. The paranasal sinuses mastoid air cells  and middle ear cavities are clear.       Impression:      Normal head CT with no acute intracranial abnormality  identified. The etiology of the patient's delirium is not established on  this exam.     Radiation dose reduction techniques were utilized, including automated  exposure control and exposure modulation based on body size.          XR Chest 1 View [[id number]] Collected: 01/14/23 0533     Updated: 01/14/23 0539    Narrative:      XR CHEST 1 VW-     HISTORY: Female who is 47 years-old,  diabetic ketoacidosis     TECHNIQUE: Frontal view of the chest     COMPARISON: None available     FINDINGS: Heart, mediastinum and pulmonary vasculature are unremarkable.  Minimal atelectasis or infiltrate peripherally at the right base. No  pleural effusion, or pneumothorax. No acute osseous process.       Impression:      Minimal atelectasis or infiltrate peripherally at the right  base.     This report was finalized on 1/14/2023 5:36 AM by Dr. Jose Mckenzie M.D.                   TEST  RESULTS PENDING AT DISCHARGE      Discharge Exam:  Alert and oriented x 4, in NAD  Supple neck, midline trach  RRR, no m/r/g, no edema  Clear bilaterally, no wheezing, nonlabored  No clubbing or cyanosis     Disposition:  Home    Patient Instructions:      Discharge Medications      New Medications      Instructions Start Date   aspirin 81 MG EC tablet   81 mg, Oral, Daily   Start Date: January 18, 2023     insulin glargine 100 UNIT/ML injection  Commonly known as: LANTUS, SEMGLEE   15 Units, Subcutaneous, Nightly      insulin lispro 100 UNIT/ML injection  Commonly known as: ADMELOG  Replaces: Insulin Lispro 100 UNIT/ML solution cartridge   0-14 Units, Subcutaneous, 4 Times Daily With Meals & Nightly      metoprolol tartrate 25 MG tablet  Commonly known as: LOPRESSOR   12.5 mg, Oral, Every 12 Hours Scheduled       ticagrelor 90 MG tablet tablet  Commonly known as: BRILINTA   90 mg, Oral, 2 Times Daily         Continue These Medications      Instructions Start Date   Accu-Chek Softclix Lancets lancets   102 each, Other, 4 Times Daily, Use as instructed         Stop These Medications    Accu-Chek Guide test strip  Generic drug: glucose blood     albuterol sulfate  (90 Base) MCG/ACT inhaler  Commonly known as: PROVENTIL HFA;VENTOLIN HFA;PROAIR HFA     baclofen 10 MG tablet  Commonly known as: LIORESAL     cetirizine 10 MG tablet  Commonly known as: zyrTEC     diazePAM 2 MG tablet  Commonly known as: VALIUM     fluconazole 150 MG tablet  Commonly known as: DIFLUCAN     fluticasone 50 MCG/ACT nasal spray  Commonly known as: FLONASE     GlucaGen HypoKit 1 MG injection  Generic drug: glucagon     glucose blood test strip     HYDROcodone-acetaminophen 5-325 MG per tablet  Commonly known as: NORCO     ibuprofen 600 MG tablet  Commonly known as: ADVIL,MOTRIN     Insulin Lispro 100 UNIT/ML solution cartridge  Replaced by: insulin lispro 100 UNIT/ML injection     linaclotide 145 MCG capsule capsule  Commonly known as: LINZESS     lisinopril 20 MG tablet  Commonly known as: PRINIVIL,ZESTRIL     NovoLOG 100 UNIT/ML injection  Generic drug: Insulin Aspart     omeprazole 40 MG capsule  Commonly known as: priLOSEC     ondansetron 8 MG tablet  Commonly known as: ZOFRAN     vilazodone 40 MG tablet tablet  Commonly known as: VIIBRYD             Your medication list      START taking these medications      Instructions Last Dose Given Next Dose Due   aspirin 81 MG EC tablet  Start taking on: January 18, 2023      Take 1 tablet by mouth Daily.       insulin glargine 100 UNIT/ML injection  Commonly known as: LANTUS, SEMGLEE      Inject 15 Units under the skin into the appropriate area as directed Every Night.       insulin lispro 100 UNIT/ML injection  Commonly known as: ADMELOG  Replaces: Insulin Lispro 100 UNIT/ML solution cartridge       Inject 0-14 Units under the skin into the appropriate area as directed 4 (Four) Times a Day With Meals & at Bedtime.       metoprolol tartrate 25 MG tablet  Commonly known as: LOPRESSOR      Take 0.5 tablets by mouth Every 12 (Twelve) Hours.       ticagrelor 90 MG tablet tablet  Commonly known as: BRILINTA      Take 1 tablet by mouth 2 (Two) Times a Day.          CONTINUE taking these medications      Instructions Last Dose Given Next Dose Due   Accu-Chek Softclix Lancets lancets      102 each by Other route 4 (Four) Times a Day. Use as instructed          STOP taking these medications    Accu-Chek Guide test strip  Generic drug: glucose blood        albuterol sulfate  (90 Base) MCG/ACT inhaler  Commonly known as: PROVENTIL HFA;VENTOLIN HFA;PROAIR HFA        baclofen 10 MG tablet  Commonly known as: LIORESAL        cetirizine 10 MG tablet  Commonly known as: zyrTEC        diazePAM 2 MG tablet  Commonly known as: VALIUM        fluconazole 150 MG tablet  Commonly known as: DIFLUCAN        fluticasone 50 MCG/ACT nasal spray  Commonly known as: FLONASE        GlucaGen HypoKit 1 MG injection  Generic drug: glucagon        glucose blood test strip        HYDROcodone-acetaminophen 5-325 MG per tablet  Commonly known as: NORCO        ibuprofen 600 MG tablet  Commonly known as: ADVIL,MOTRIN        Insulin Lispro 100 UNIT/ML solution cartridge  Replaced by: insulin lispro 100 UNIT/ML injection        linaclotide 145 MCG capsule capsule  Commonly known as: LINZESS        lisinopril 20 MG tablet  Commonly known as: PRINIVIL,ZESTRIL        NovoLOG 100 UNIT/ML injection  Generic drug: Insulin Aspart        omeprazole 40 MG capsule  Commonly known as: priLOSEC        ondansetron 8 MG tablet  Commonly known as: ZOFRAN        vilazodone 40 MG tablet tablet  Commonly known as: VIIBRYD              Where to Get Your Medications      These medications were sent to [address]  [place] [address]    Hours: 7:00 AM-6:00 PM Mon-Fri, 8:00 AM-4:30 PM Sat-Sun (Closed 12-12:30PM) Phone: [phone]   · aspirin 81 MG EC tablet  · insulin glargine 100 UNIT/ML injection  · insulin lispro 100 UNIT/ML injection  · metoprolol tartrate 25 MG tablet  · ticagrelor 90 MG tablet tablet             Medication Reconciliation: Please see electronically completed Med Rec.    Total time spent discharging patient including evaluation, medication reconciliation, arranging follow up, and post hospitalization instructions and education to patient and family total time exceeds 30 minutes.    Signed:  Demetris Espinosa MD  1/17/2023  12:31 EST

## 2023-01-17 NOTE — PROGRESS NOTES
"DOS: 2023  NAME: Xena Briseno   : 1975  PCP: Kim Aguillon    CC: altered mental status    Subjective: Pt seen in follow up, however the problem is new to me.  Patient sitting up the side of the bed.  She states \"I do not know what I feel\".  She states she is confused about the procedure she had done yesterday.  She denies any new weakness, numbness, speech or visual disturbances, or headaches.  No family at bedside    Objective:  Vital signs:      Vitals:    23 0353 23 0700 23 1100 23 1135   BP: 127/74 133/90 134/85 134/85   BP Location:  Left arm Left arm    Patient Position:  Sitting Sitting    Pulse: 88 85 79 70   Resp: 18 18 18    Temp: 98.4 °F (36.9 °C) 97.2 °F (36.2 °C) 98.6 °F (37 °C)    TempSrc: Oral Axillary  Comment: did axillary patient had just drunk ice water Oral    SpO2: 98% 100% 100% 99%   Weight: 62.3 kg (137 lb 4.8 oz)      Height:           Current Facility-Administered Medications:   •  acetaminophen (TYLENOL) tablet 650 mg, 650 mg, Oral, Q4H PRN, 650 mg at 23 **OR** acetaminophen (TYLENOL) suppository 650 mg, 650 mg, Rectal, Q4H PRN, Santosh Tran MD  •  acetaminophen (TYLENOL) tablet 650 mg, 650 mg, Oral, Q4H PRN, Santosh Tran MD  •  albuterol (PROVENTIL) nebulizer solution 0.083% 2.5 mg/3mL, 2.5 mg, Nebulization, Q6H PRN, Santosh Tran MD  •  aspirin EC tablet 81 mg, 81 mg, Oral, Daily, Santosh Tran MD, 81 mg at 23 0809  •  baclofen (LIORESAL) tablet 5 mg, 5 mg, Oral, Q12H PRN, Santosh Tran MD  •  dextrose (D50W) (25 g/50 mL) IV injection 25 g, 25 g, Intravenous, Q15 Min PRN, Santosh Tran MD  •  dextrose (GLUTOSE) oral gel 15 g, 15 g, Oral, Q15 Min PRN, Santosh Tran MD  •  glucagon (human recombinant) (GLUCAGEN DIAGNOSTIC) injection 1 mg, 1 mg, Intramuscular, Q15 Min PRN, Santosh Tran MD  •  insulin glargine (LANTUS, SEMGLEE) injection 15 Units, 15 Units, Subcutaneous, Nightly, Santosh Tran MD, 15 Units at 23  •  insulin lispro " (ADMELOG) injection 0-14 Units, 0-14 Units, Subcutaneous, 4x Daily With Meals & Nightly, Santosh Tran MD, 8 Units at 01/17/23 1232  •  iopamidol (ISOVUE-370) 76 % injection, , , Code / Trauma / Sedation Medication, Santosh Tran MD, 84 mL at 01/16/23 1050  •  Magnesium Sulfate 2 gram infusion - Mg less than or equal to 1.5 mg/dL, 2 g, Intravenous, PRN **OR** Magnesium Sulfate 1 gram infusion - Mg 1.6-1.9 mg/dL, 1 g, Intravenous, PRN, Santosh Tran MD  •  metoprolol tartrate (LOPRESSOR) tablet 12.5 mg, 12.5 mg, Oral, Q12H, Santosh Tran MD, 12.5 mg at 01/17/23 0809  •  ondansetron (ZOFRAN) injection 4 mg, 4 mg, Intravenous, Q6H PRN, Santosh Tran MD  •  pantoprazole (PROTONIX) EC tablet 40 mg, 40 mg, Oral, Q AM, Santosh Tran MD, 40 mg at 01/17/23 0202  •  potassium chloride (K-DUR,KLOR-CON) ER tablet 40 mEq, 40 mEq, Oral, PRN, 40 mEq at 01/17/23 1232 **OR** potassium chloride (KLOR-CON) packet 40 mEq, 40 mEq, Oral, PRN **OR** potassium chloride 10 mEq in 100 mL IVPB, 10 mEq, Intravenous, Q1H PRN, Santosh Tran MD  •  potassium phosphate 45 mmol in sodium chloride 0.9 % 500 mL infusion, 45 mmol, Intravenous, PRN **OR** potassium phosphate 30 mmol in sodium chloride 0.9 % 250 mL infusion, 30 mmol, Intravenous, PRN **OR** potassium phosphate 15 mmol in sodium chloride 0.9 % 100 mL infusion, 15 mmol, Intravenous, PRN **OR** sodium phosphates 40 mmol in sodium chloride 0.9 % 500 mL IVPB, 40 mmol, Intravenous, PRN **OR** sodium phosphates 20 mmol in sodium chloride 0.9 % 250 mL IVPB, 20 mmol, Intravenous, PRN, Santosh Tran MD, Last Rate: 31.3 mL/hr at 01/14/23 1600, 20 mmol at 01/14/23 1600  •  sodium chloride 0.9 % flush 10 mL, 10 mL, Intravenous, Q12H, Santosh Tran MD, 10 mL at 01/17/23 0811  •  sodium chloride 0.9 % flush 10 mL, 10 mL, Intravenous, PRN, Santosh Tran MD  •  sodium chloride 0.9 % infusion 40 mL, 40 mL, Intravenous, PRN, Santosh Tran MD  •  sodium chloride 0.9 % infusion, 100 mL/hr, Intravenous, Continuous, Santosh Tran,  MD, Last Rate: 100 mL/hr at 01/15/23 1302, 100 mL/hr at 01/15/23 1302  •  ticagrelor (BRILINTA) tablet 90 mg, 90 mg, Oral, BID, Santosh Tran MD, 90 mg at 01/17/23 0809    PRN meds  •  acetaminophen **OR** acetaminophen  •  acetaminophen  •  albuterol  •  baclofen  •  dextrose  •  dextrose  •  glucagon (human recombinant)  •  iopamidol  •  magnesium sulfate **OR** magnesium sulfate in D5W 1g/100mL (PREMIX)  •  ondansetron  •  potassium chloride **OR** potassium chloride **OR** potassium chloride  •  potassium phosphate infusion greater than 15 mMoles **OR** potassium phosphate infusion greater than 15 mMoles **OR** potassium phosphate **OR** sodium phosphate IVPB **OR** sodium phosphate IVPB  •  sodium chloride  •  sodium chloride    No current facility-administered medications on file prior to encounter.     Current Outpatient Medications on File Prior to Encounter   Medication Sig   • Accu-Chek Guide test strip TEST FOUR TIMES A DAY   • Accu-Chek Softclix Lancets lancets 102 each by Other route 4 (Four) Times a Day. Use as instructed   • albuterol sulfate  (90 Base) MCG/ACT inhaler albuterol sulfate HFA 90 mcg/actuation aerosol inhaler   • baclofen (LIORESAL) 10 MG tablet baclofen 10 mg tablet   • cetirizine (zyrTEC) 10 MG tablet cetirizine 10 mg tablet   • diazePAM (VALIUM) 2 MG tablet diazepam 2 mg tablet   • fluconazole (DIFLUCAN) 150 MG tablet fluconazole 150 mg tablet   • glucagon (GlucaGen HypoKit) 1 MG injection GlucaGen HypoKit 1 mg Injection   • glucose blood test strip 50 each by Other route As Needed. Use as instructed   • HYDROcodone-acetaminophen (NORCO) 5-325 MG per tablet hydrocodone 5 mg-acetaminophen 325 mg tablet   • ibuprofen (ADVIL,MOTRIN) 600 MG tablet ibuprofen 600 mg tablet   • Insulin Lispro 100 UNIT/ML solution cartridge Inject  under the skin into the appropriate area as directed.   • linaclotide (LINZESS) 145 MCG capsule capsule Linzess 145 mcg capsule   Take 1 capsule every day by  oral route as needed.   • lisinopril (PRINIVIL,ZESTRIL) 20 MG tablet lisinopril 20 mg tablet   • NovoLOG 100 UNIT/ML injection INJECT 65 UNITS AS DIRECTED EVERY DAY   • ondansetron (ZOFRAN) 8 MG tablet ondansetron HCl 8 mg tablet   • fluticasone (FLONASE) 50 MCG/ACT nasal spray fluticasone propionate 50 mcg/actuation nasal spray,suspension (Patient not taking: Reported on 1/14/2023)   • omeprazole (priLOSEC) 40 MG capsule omeprazole 40 mg capsule,delayed release (Patient not taking: Reported on 1/14/2023)   • vilazodone (VIIBRYD) 40 MG tablet tablet Viibryd 40 mg tablet (Patient not taking: Reported on 1/14/2023)       General appearance: chronically ill appearing, NAD, alert and cooperative, well groomed  HEENT: Normocephalic, atraumatic, PERRL, no masses or tenderness  Resp: Even and unlabored  Extremities: No obvious edema  Skin: warm, dry    Neurological:   MS: oriented x3, recent/remote memory mildly impaired, normal attention/concentration, some mild speech delay with response, no neglect, normal fund of knowledge  CN: visual acuity grossly normal, visual fields full, EOMI, facial sensation equal, no facial droop, tongue midline  Motor: 5/5 in all 4 ext.  Sensory: light touch sensation intact in all 4 ext.  Coordination: Normal finger to nose test  Gait and station: favoring the left, stand by assist x1  Rapid alternating movements: normal finger to thumb tap    Laboratory results:  Lab Results   Component Value Date    TSH 1.316 01/13/2023     Lab Results   Component Value Date    HGBA1C 9.80 (H) 01/14/2023     No results found for: [id number]  Lab Results   Component Value Date    CHOL 167 01/17/2023     Lab Results   Component Value Date    TRIG 94 01/17/2023     Lab Results   Component Value Date    HDL 38 (L) 01/17/2023     Lab Results   Component Value Date     (H) 01/17/2023     Lab Results   Component Value Date    WBC 5.21 01/17/2023    HGB 11.2 (L) 01/17/2023    HCT 34.7 01/17/2023    MCV  101.2 (H) 01/17/2023     01/17/2023     Lab Results   Component Value Date    GLUCOSE 356 (H) 01/17/2023    BUN 7 01/17/2023    CREATININE 0.48 (L) 01/17/2023    BCR 14.6 01/17/2023    K 3.4 (L) 01/17/2023    CO2 28.0 01/17/2023    CALCIUM 7.9 (L) 01/17/2023    ALBUMIN 3.0 (L) 01/16/2023    AST 23 01/16/2023    ALT 27 01/16/2023     Lab Results   Component Value Date    PTT 28.9 01/14/2023     Lab Results   Component Value Date    INR 1.00 01/14/2023    INR 1.25 (H) 01/13/2023    PROTIME 13.3 01/14/2023    PROTIME 15.3 (H) 01/13/2023     Brief Urine Lab Results     None          Review and interpretation of imaging:  CT Head Without Contrast    Result Date: 1/17/2023  Normal head CT with no acute intracranial abnormality identified. The etiology of the patient's delirium is not established on this exam.  Radiation dose reduction techniques were utilized, including automated exposure control and exposure modulation based on body size.       XR Chest 1 View    Result Date: 1/14/2023  Minimal atelectasis or infiltrate peripherally at the right base.  This report was finalized on 1/14/2023 5:36 AM by Dr. Jose Mckenzie M.D.      XR Chest 1 View    Result Date: 1/13/2023  No acute cardiopulmonary findings. Images personally reviewed, interpreted and dictated by CLAUDINE Mabry M.D.    CT chest for pulmonary embolus    Result Date: 1/13/2023  No pulmonary embolus. Distal esophageal wall thickening can be correlated with symptoms of esophagitis. Images personally reviewed, interpreted and dictated by CLAUDINE Mabry M.D.    Results for orders placed during the hospital encounter of 01/14/23    Adult Transthoracic Echo Complete W/ Cont if Necessary Per Protocol    Interpretation Summary  •  Left ventricular systolic function is normal. Calculated left ventricular EF = 64.2%  •  Left ventricular diastolic function was normal.  •  Left atrial volume is mildly increased.  •  Saline test results are negative.  •   "Estimated right ventricular systolic pressure from tricuspid regurgitation is normal (<35 mmHg).      Impression/Assessment:  This is a 47-year-old female with past medical history of alcohol abuse, diabetes type 1, hypertension, hyperlipidemia, anxiety and depression, sleep apnea who presented to the hospital on 1/14/2023 after being transferred from [facility] to undergo further management of DKA and NSTEMI.  She underwent a coronary angiogram with stent placement on 1/16.  Our service was consulted due to complaints of patient appearing altered and having slow responses with her speech.  Patient reported that over the last few days prior to her admission that she felt that her thinking was not as \"sharp\" as it usually is.  She stated that ever since she stopped her insulin pump she felt that she was becoming more confused.  She has a history of being noncompliant with her medications.  She has had several hospitalizations over the last several months for DKA.  She was initially evaluated by Dr. Maguire who felt that she was likely suffering from toxic metabolic encephalopathy which is multifactorial from her underlying DKA and multiple medical problems.  CT head was obtained this morning and revealed no acute intracranial abnormalities.  She still feels like she is having slower speech today.  She states she is just confused but also overwhelmed with the procedure she had done yesterday.  She had no other focal neurologic deficits on exam today.  She does report that she drinks alcohol heavily and her last drink was a few days ago.  When questioned about substance abuse she stated \"are they all the same\".  There was no UDS checked from what I can see but she did have an ethanol level checked on 1/13 that was <3. Per review of her home med rec she is taking Norco 5 mg, Valium 2 mg, and baclofen 10 mg.  Primary stopped most of her home medications including the benzos and recommended that she not restart them " in the outpatient setting due to her poor memory.    Diagnosis:  Toxic metabolic encephalopathy  Type 1 diabetes with diabetic ketoacidosis, uncontrolled  Alcohol abuse  Tobacco abuse  Coronary artery disease status post stent  Depression and anxiety    Plan:   CT head appears stable.  No focal neurologic deficits on exam.  She had a flat affect and was inattentive.  She voiced feeling very overwhelmed with her medical care and the procedure she had yesterday.  Check B12 and folate levels  Access center consult for alcohol abuse and depression   Defer potential need of CIWA protocol to primary  We discussed the importance of getting treatment for her alcohol abuse.  Discussed with her that excessive use of alcohol can lead to dementia along with all of her other poorly controlled risk factors.  Smoking cessation also discussed.  Agree with not resuming her benzodiazepines in the outpatient setting.  She can follow-up in our clinic in the outpatient setting for further formal cognitive testing.  I think she would also benefit from neuropsychological testing.  Primary plans to discharge her home today.  We will sign off, will see again per request    Case discussed with patient, RN, and Dr. Maguire, and he agrees with plan above.     ARACELI Marquis

## 2023-01-17 NOTE — CONSULTS
Diabetes Education    Patient Name:  Xena Briseno  YOB: 1975  MRN: [id number]  Admit Date:  1/14/2023    Reviewed discharge insulin orders with patient.  After multiple reviews, patient verbalized understanding.  Discussed hyperglycemia potentially contributing to patient's cognition. Reassured patient that written materials would be provided to patient via patient's nurse per discharge process.        Reviewed hypoglycemia s/s and tx.  Encouraged reaching out to Endocrinology if glucose out of control prior to upcoming appointment on the 20th.     Electronically signed by:  Alona Coley RN, [facility]  01/17/23 13:57 EST

## 2023-01-17 NOTE — PROGRESS NOTES
"    Patient Name: Xena Briseno  :1975  47 y.o.      Patient Care Team:  Kim Aguillon as PCP - General (Nurse Practitioner)  Jaquelin Grover APRN as Nurse Practitioner (Endocrinology)    Chief Complaint:   DKA    Interval History:   Status post left heart cath with JALIL to severe OM 2 lesion.  Tolerated procedure well, remains with flat affect and slow to answer questions.      Objective   Vital Signs  Temp:  [97.2 °F (36.2 °C)-98.4 °F (36.9 °C)] 97.2 °F (36.2 °C)  Heart Rate:  [77-97] 85  Resp:  [12-19] 18  BP: (115-133)/(66-90) 133/90    Intake/Output Summary (Last 24 hours) at 2023 0926  Last data filed at 2023 0642  Gross per 24 hour   Intake 1280 ml   Output 1300 ml   Net -20 ml     Flowsheet Rows    Flowsheet Row First Filed Value   Admission Height 165.1 cm (65\") Documented at 2023 0928   Admission Weight 62.3 kg (137 lb 5.6 oz) Documented at 2023 0928          GEN: no distress, alert and oriented  HEENT: NACT, EOMI, moist mucous membranes  Lungs: CTAB, no wheezes, rales or rhonchi  CV: normal rate, regular rhythm, normal S1, S2, no murmurs, radial artery access site with mild ecchymosis, no hematoma, pulse 2+  Abdomen: soft, nontender, nondistended, NABS  Extremities: no edema  Skin: no rash, warm, dry  Heme/Lymph: no bruising  Psych: Flat affect, slow to answer and ask questions    Results Review:    Results from last 7 days   Lab Units 23  0338   SODIUM mmol/L 138   POTASSIUM mmol/L 3.4*   CHLORIDE mmol/L 104   CO2 mmol/L 28.0   BUN mg/dL 7   CREATININE mg/dL 0.48*   GLUCOSE mg/dL 356*   CALCIUM mg/dL 7.9*     Results from last 7 days   Lab Units 23  0503   TROPONIN T ng/mL 0.240*     Results from last 7 days   Lab Units 23  0338   WBC 10*3/mm3 5.21   HEMOGLOBIN g/dL 11.2*   HEMATOCRIT % 34.7   PLATELETS 10*3/mm3 230     Results from last 7 days   Lab Units 23  0503 23  2316   INR  1.00 1.25*   APTT seconds 28.9 115.3*     Results from " last 7 days   Lab Units 01/14/23  2320   MAGNESIUM mg/dL 2.0     Results from last 7 days   Lab Units 01/17/23  0338   CHOLESTEROL mg/dL 167   TRIGLYCERIDES mg/dL 94   HDL CHOL mg/dL 38*   LDL CHOL mg/dL 111*               Medication Review:   aspirin, 81 mg, Oral, Daily  insulin glargine, 15 Units, Subcutaneous, Nightly  insulin lispro, 0-14 Units, Subcutaneous, 4x Daily With Meals & Nightly  metoprolol tartrate, 12.5 mg, Oral, Q12H  pantoprazole, 40 mg, Oral, Q AM  sodium chloride, 10 mL, Intravenous, Q12H  ticagrelor, 90 mg, Oral, BID         sodium chloride, 100 mL/hr, Last Rate: 100 mL/hr (01/15/23 1302)        Assessment & Plan   #DKA  #Elevated troponin  #Hypertension  #Hyperlipidemia     47-year-old woman current smoker with diabetes, hypertension, hyperlipidemia who was transferred to [facility] for further management of DKA and elevated troponin.  EKG with normal sinus rhythm and no ischemic changes.  This is unlikely to be ACS and her elevated troponin is likely demand from her DKA however she clearly has risk factors for CAD. She has already been started on heparin drip which we can discontinue.     Echocardiogram 1/15/2023 with normal systolic function, normal diastolic function, mildly increased left atrial volume, normal RVSP. Left heart cath with PCI to severe OM2 lesion with 2.5 x 18mm Xience Skypoint drug-eluting stent (postdilated to 2.75 x15mm NC)    - Continue aspirin 81 mg daily, ticagrelor 90 mg twice daily, metoprolol tartrate 12.5 mg twice daily  - She would benefit from Repatha as an outpatient due to her statin intolerance  - Stressed importance of medication adherence to patient and  over the phone on 1/16/2023 and they were understanding    She is stable for discharge from cardiac standpoint.  We will sign off at this time.  Please call back with any questions or concerns.    Santosh Tran MD  [facility]  01/17/23  09:26 EST

## 2023-01-17 NOTE — CONSULTS
Met with patient, discussed benefits of cardiac rehab. Provided phase II information along with the contact information for cardiac rehab at [facility] Patient to review information regarding program.
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
## 2023-01-18 ENCOUNTER — OFFICE VISIT (OUTPATIENT)
Dept: DIABETES SERVICES | Facility: HOSPITAL | Age: 48
End: 2023-01-18
Payer: MEDICARE

## 2023-01-18 VITALS
BODY MASS INDEX: 22.99 KG/M2 | DIASTOLIC BLOOD PRESSURE: 70 MMHG | OXYGEN SATURATION: 98 % | TEMPERATURE: 98.4 F | HEART RATE: 88 BPM | SYSTOLIC BLOOD PRESSURE: 126 MMHG | WEIGHT: 138 LBS | HEIGHT: 65 IN

## 2023-01-18 DIAGNOSIS — E10.65 UNCONTROLLED TYPE 1 DIABETES MELLITUS WITH HYPERGLYCEMIA: Primary | ICD-10-CM

## 2023-01-18 DIAGNOSIS — Z91.199 NONCOMPLIANCE WITH TREATMENT PLAN: ICD-10-CM

## 2023-01-18 LAB — GLUCOSE BLDC GLUCOMTR-MCNC: 241 MG/DL (ref 70–99)

## 2023-01-18 PROCEDURE — 3046F HEMOGLOBIN A1C LEVEL >9.0%: CPT | Performed by: NURSE PRACTITIONER

## 2023-01-18 PROCEDURE — 82962 GLUCOSE BLOOD TEST: CPT | Performed by: NURSE PRACTITIONER

## 2023-01-18 PROCEDURE — G0463 HOSPITAL OUTPT CLINIC VISIT: HCPCS | Performed by: NURSE PRACTITIONER

## 2023-01-18 PROCEDURE — 99214 OFFICE O/P EST MOD 30 MIN: CPT | Performed by: NURSE PRACTITIONER

## 2023-01-18 RX ORDER — PEN NEEDLE, DIABETIC 32GX 5/32"
1 NEEDLE, DISPOSABLE MISCELLANEOUS AS NEEDED
Qty: 100 EACH | Refills: 5 | Status: SHIPPED | OUTPATIENT
Start: 2023-01-18

## 2023-01-18 NOTE — OUTREACH NOTE
Prep Survey    Flowsheet Row Responses   Congregational facility patient discharged from? [facility]   Is LACE score < 7 ? No   Eligibility Readm Mgmt   Discharge diagnosis NSTEMI    Does the patient have one of the following disease processes/diagnoses(primary or secondary)? Acute MI (STEMI,NSTEMI)   Does the patient have Home health ordered? No   Is there a DME ordered? No   Prep survey completed? Yes          ANABEL RENTERIA - Registered Nurse

## 2023-01-18 NOTE — PAYOR COMM NOTE
"Xena Reaves (47 y.o. Female)                                 ATTENTION; DISCHARGE CASE REF #[id number]      Date of Birth   1975    Social Security Number       Address   [address] [place], [place] [address]    Home Phone   [phone]    MRN   [id number]       [facility]    Marital Status   Single                            Admission Date   23    Admission Type   Urgent    Admitting Provider   Demetris Espinosa MD    Attending Provider       Department, Room/Bed   [address] [facility],        Discharge Date   2023    Discharge Disposition   Home or Self Care    Discharge Destination                               Attending Provider: (none)   Allergies: Statins    Isolation: None   Infection: None   Code Status: Prior    Ht: 165.1 cm (65\")   Wt: 62.3 kg (137 lb 4.8 oz)    Admission Cmt: None   Principal Problem: S/P drug eluting coronary stent placement [Z95.5]                 Active Insurance as of 2023     Primary Coverage     Payor Plan Insurance Group Employer/Plan Group    [employer] [place] MEDICARE REPLACEMENT [facility] MEDICARE REPLACEMENT      Payor Plan Address Payor Plan Phone Number Payor Plan Fax Number Effective Dates    [address] [address] [phone]  2022 - None Entered    [facility] [address]       Subscriber Name Subscriber Birth Date Member ID       XENA REAVES 1975 [id number]                 Emergency Contacts      (Rel.) Home Phone Work Phone Mobile Phone    Baldo Reaves (Spouse) [phone] -- [phone]    Starr Guerra (Daughter) [phone] -- [phone]    AMOS ABAD (Other) [phone] -- --               Discharge Summary      Demetris Espinosa MD at 23 1231          Patient Identification:  Name: Xena Reaves  Age: 47 y.o.  Sex: female  :  1975  MRN: [id number]  Primary Care Physician: Kim Aguillon    Admit date: 2023  Discharge date and time: " 2023  Discharged Condition: good    Discharge Diagnoses:    S/P drug eluting coronary stent placement    DKA (diabetic ketoacidosis) (HCC)    NSTEMI (non-ST elevated myocardial infarction) (HCC)  Multivessel coronary artery disease  Medical noncompliance  Current smoker  Leukocytosis  GERD  Hypertension  Chronic low back pain  Anxiety and depression  Poor tolerance to statins due to myositis, fatigue and muscle pain  Poor medical literacy  Diabetes mellitus type 2 on subcutaneous insulin      Hospital Course: Xena Briseno presented to [facility] transferred from outside hospital for management of diabetic ketoacidosis.  She also had elevated troponin and was admitted to the intensive care unit.  There her DKA was treated with insulin intravenously, following DKA protocol.  Cardiology was consulted upon arrival and once her DKA improved, they took her to cardiac catheterization which showed multivessel coronary disease.  She did undergo drug-eluting stent placement to obtuse marginal coronary artery branch lesion.  She was started on aspirin, Brilinta and beta-blockers.  She has a history of intolerance to statins, muscle weakness and soreness.  She was found to be having difficulty with memory.  Neurology was consulted for evaluation.  From a review of history this patient has had numerous admissions to the hospital over the past 4 months with repeating episodes of diabetic ketoacidosis.  She does not have good understanding of her disease and she has poor medical literacy.  According to her friends and family she had been having difficulty with her insulin sliding scale at home and her blood sugars were always running in the 500 or 600.  She was seen here in the hospital by diabetes educator as well as cardiology nurse navigator.  All her outpatient appointments were confirmed for her to follow-up with primary care, endocrinology and cardiology.  Head CT scan was obtained showing normal  results, no clear explanation for her poor memory and poor cognition.  Neurology attributed her confusion to DKA and metabolic encephalopathy.  They have not recommended any follow-up as outpatient with neurology.  Due to her encephalopathy and her poor memory we stopped most of her home medications including benzodiazepines.  She has not manifested any withdrawal symptoms from benzodiazepines.  We recommend not restarting benzodiazepines as an outpatient due to her poor memory.  She needs cognitive testing as an outpatient and close follow-up with her primary care physician  She has maximally benefited from acute inpatient care and is ready for discharge home today.      Consults:   IP CONSULT TO CARDIOLOGY  IP CONSULT TO ADVANCE CARE PLANNING  IP CONSULT TO DIABETES EDUCATOR  IP CONSULT TO CASE MANAGEMENT   IP CONSULT TO NEUROLOGY  IP CONSULT TO ACCESS CENTER    Significant Diagnostic Studies:   Imaging Results (Most Recent)     Procedure Component Value Units Date/Time    CT Head Without Contrast [[id number]] Collected: 01/17/23 1153     Updated: 01/17/23 1153    Narrative:      NONCONTRAST HEAD CT 01/17/2023     CLINICAL HISTORY: Patient has delirium.     TECHNIQUE: Spiral CT images were obtained from the base of the skull to  the vertex without intravenous contrast. Images were reformatted and  submitted in 3 mm thick axial sagittal and coronal CT sections with  brain algorithm.     There are no prior studies from [facility] for  comparison.     FINDINGS: The brain parenchyma is normal in attenuation. The ventricles  are normal in size. I see no focal mass effect. There is no midline  shift. No extra-axial fluid collections are identified. There is no  evidence of acute intracranial hemorrhage. The calvarium and the skull  base are normal in appearance. The paranasal sinuses mastoid air cells  and middle ear cavities are clear.       Impression:      Normal head CT with no acute  intracranial abnormality  identified. The etiology of the patient's delirium is not established on  this exam.     Radiation dose reduction techniques were utilized, including automated  exposure control and exposure modulation based on body size.          XR Chest 1 View [[id number]] Collected: 01/14/23 0533     Updated: 01/14/23 0539    Narrative:      XR CHEST 1 VW-     HISTORY: Female who is 47 years-old,  diabetic ketoacidosis     TECHNIQUE: Frontal view of the chest     COMPARISON: None available     FINDINGS: Heart, mediastinum and pulmonary vasculature are unremarkable.  Minimal atelectasis or infiltrate peripherally at the right base. No  pleural effusion, or pneumothorax. No acute osseous process.       Impression:      Minimal atelectasis or infiltrate peripherally at the right  base.     This report was finalized on 1/14/2023 5:36 AM by Dr. Jsoe Mckenzie M.D.                   TEST  RESULTS PENDING AT DISCHARGE      Discharge Exam:  Alert and oriented x 4, in NAD  Supple neck, midline trach  RRR, no m/r/g, no edema  Clear bilaterally, no wheezing, nonlabored  No clubbing or cyanosis     Disposition:  Home    Patient Instructions:      Discharge Medications      New Medications      Instructions Start Date   aspirin 81 MG EC tablet   81 mg, Oral, Daily   Start Date: January 18, 2023     insulin glargine 100 UNIT/ML injection  Commonly known as: LANTUS, SEMGLEE   15 Units, Subcutaneous, Nightly      insulin lispro 100 UNIT/ML injection  Commonly known as: ADMELOG  Replaces: Insulin Lispro 100 UNIT/ML solution cartridge   0-14 Units, Subcutaneous, 4 Times Daily With Meals & Nightly      metoprolol tartrate 25 MG tablet  Commonly known as: LOPRESSOR   12.5 mg, Oral, Every 12 Hours Scheduled      ticagrelor 90 MG tablet tablet  Commonly known as: BRILINTA   90 mg, Oral, 2 Times Daily         Continue These Medications      Instructions Start Date   Accu-Chek Softclix Lancets lancets   102 each, Other, 4  Times Daily, Use as instructed         Stop These Medications    Accu-Chek Guide test strip  Generic drug: glucose blood     albuterol sulfate  (90 Base) MCG/ACT inhaler  Commonly known as: PROVENTIL HFA;VENTOLIN HFA;PROAIR HFA     baclofen 10 MG tablet  Commonly known as: LIORESAL     cetirizine 10 MG tablet  Commonly known as: zyrTEC     diazePAM 2 MG tablet  Commonly known as: VALIUM     fluconazole 150 MG tablet  Commonly known as: DIFLUCAN     fluticasone 50 MCG/ACT nasal spray  Commonly known as: FLONASE     GlucaGen HypoKit 1 MG injection  Generic drug: glucagon     glucose blood test strip     HYDROcodone-acetaminophen 5-325 MG per tablet  Commonly known as: NORCO     ibuprofen 600 MG tablet  Commonly known as: ADVIL,MOTRIN     Insulin Lispro 100 UNIT/ML solution cartridge  Replaced by: insulin lispro 100 UNIT/ML injection     linaclotide 145 MCG capsule capsule  Commonly known as: LINZESS     lisinopril 20 MG tablet  Commonly known as: PRINIVIL,ZESTRIL     NovoLOG 100 UNIT/ML injection  Generic drug: Insulin Aspart     omeprazole 40 MG capsule  Commonly known as: priLOSEC     ondansetron 8 MG tablet  Commonly known as: ZOFRAN     vilazodone 40 MG tablet tablet  Commonly known as: VIIBRYD             Your medication list      START taking these medications      Instructions Last Dose Given Next Dose Due   aspirin 81 MG EC tablet  Start taking on: January 18, 2023      Take 1 tablet by mouth Daily.       insulin glargine 100 UNIT/ML injection  Commonly known as: LANTUS, SEMGLEE      Inject 15 Units under the skin into the appropriate area as directed Every Night.       insulin lispro 100 UNIT/ML injection  Commonly known as: ADMELOG  Replaces: Insulin Lispro 100 UNIT/ML solution cartridge      Inject 0-14 Units under the skin into the appropriate area as directed 4 (Four) Times a Day With Meals & at Bedtime.       metoprolol tartrate 25 MG tablet  Commonly known as: LOPRESSOR      Take 0.5 tablets by  mouth Every 12 (Twelve) Hours.       ticagrelor 90 MG tablet tablet  Commonly known as: BRILINTA      Take 1 tablet by mouth 2 (Two) Times a Day.          CONTINUE taking these medications      Instructions Last Dose Given Next Dose Due   Accu-Chek Softclix Lancets lancets      102 each by Other route 4 (Four) Times a Day. Use as instructed          STOP taking these medications    Accu-Chek Guide test strip  Generic drug: glucose blood        albuterol sulfate  (90 Base) MCG/ACT inhaler  Commonly known as: PROVENTIL HFA;VENTOLIN HFA;PROAIR HFA        baclofen 10 MG tablet  Commonly known as: LIORESAL        cetirizine 10 MG tablet  Commonly known as: zyrTEC        diazePAM 2 MG tablet  Commonly known as: VALIUM        fluconazole 150 MG tablet  Commonly known as: DIFLUCAN        fluticasone 50 MCG/ACT nasal spray  Commonly known as: FLONASE        GlucaGen HypoKit 1 MG injection  Generic drug: glucagon        glucose blood test strip        HYDROcodone-acetaminophen 5-325 MG per tablet  Commonly known as: NORCO        ibuprofen 600 MG tablet  Commonly known as: ADVIL,MOTRIN        Insulin Lispro 100 UNIT/ML solution cartridge  Replaced by: insulin lispro 100 UNIT/ML injection        linaclotide 145 MCG capsule capsule  Commonly known as: LINZESS        lisinopril 20 MG tablet  Commonly known as: PRINIVIL,ZESTRIL        NovoLOG 100 UNIT/ML injection  Generic drug: Insulin Aspart        omeprazole 40 MG capsule  Commonly known as: priLOSEC        ondansetron 8 MG tablet  Commonly known as: ZOFRAN        vilazodone 40 MG tablet tablet  Commonly known as: VIIBRYD              Where to Get Your Medications      These medications were sent to [place] [id number]    Hours: 7:00 AM-6:00 PM Mon-Fri, 8:00 AM-4:30 PM Sat-Sun (Closed 12-12:30PM) Phone: [phone]   · aspirin 81 MG EC tablet  · insulin glargine 100 UNIT/ML injection  · insulin lispro 100 UNIT/ML  injection  · metoprolol tartrate 25 MG tablet  · ticagrelor 90 MG tablet tablet             Medication Reconciliation: Please see electronically completed Med Rec.    Total time spent discharging patient including evaluation, medication reconciliation, arranging follow up, and post hospitalization instructions and education to patient and family total time exceeds 30 minutes.    Signed:  Demetris Espinosa MD  1/17/2023  12:31 EST    Electronically signed by Demetris Espinosa MD at 01/17/23 3582

## 2023-01-20 ENCOUNTER — READMISSION MANAGEMENT (OUTPATIENT)
Dept: CALL CENTER | Facility: HOSPITAL | Age: 48
End: 2023-01-20
Payer: MEDICARE

## 2023-01-20 NOTE — OUTREACH NOTE
AMI Week 1 Survey    Flowsheet Row Responses   [facility] facility patient discharged from? [place]   Does the patient have one of the following disease processes/diagnoses(primary or secondary)? Acute MI (STEMI,NSTEMI)   Week 1 attempt successful? No   Unsuccessful attempts Attempt 1          INDU HIDALGO - Registered Nurse

## 2023-01-23 ENCOUNTER — READMISSION MANAGEMENT (OUTPATIENT)
Dept: CALL CENTER | Facility: HOSPITAL | Age: 48
End: 2023-01-23
Payer: MEDICARE

## 2023-01-23 NOTE — OUTREACH NOTE
AMI Week 1 Survey    Flowsheet Row Responses   [facility] facility patient discharged from? [place]   Does the patient have one of the following disease processes/diagnoses(primary or secondary)? Acute MI (STEMI,NSTEMI)   Week 1 attempt successful? No   Unsuccessful attempts Attempt 2          GENNY STEVENSON - Registered Nurse

## 2023-01-24 ENCOUNTER — READMISSION MANAGEMENT (OUTPATIENT)
Dept: CALL CENTER | Facility: HOSPITAL | Age: 48
End: 2023-01-24
Payer: MEDICARE

## 2023-01-24 NOTE — OUTREACH NOTE
AMI Week 1 Survey    Flowsheet Row Responses   [facility] facility patient discharged from? [place]   Does the patient have one of the following disease processes/diagnoses(primary or secondary)? Acute MI (STEMI,NSTEMI)   Week 1 attempt successful? No   Call start time 1058   Unsuccessful attempts Attempt 3   Discharge diagnosis NSTEMI           SHANE MARTINEZ - Registered Nurse

## 2023-01-26 LAB
ARSENIC BLD-MCNC: 1 UG/L (ref 0–9)
CADMIUM BLD-MCNC: 0.7 UG/L (ref 0–1.2)
LEAD BLDV-MCNC: <1 UG/DL (ref 0–3.4)
MERCURY BLD-MCNC: <1 UG/L (ref 0–14.9)

## 2023-02-10 ENCOUNTER — OFFICE VISIT (OUTPATIENT)
Dept: DIABETES SERVICES | Facility: CLINIC | Age: 48
End: 2023-02-10
Payer: MEDICARE

## 2023-02-10 VITALS
DIASTOLIC BLOOD PRESSURE: 75 MMHG | SYSTOLIC BLOOD PRESSURE: 119 MMHG | HEIGHT: 65 IN | OXYGEN SATURATION: 99 % | BODY MASS INDEX: 22.49 KG/M2 | HEART RATE: 86 BPM | WEIGHT: 135 LBS

## 2023-02-10 DIAGNOSIS — E10.65 UNCONTROLLED TYPE 1 DIABETES MELLITUS WITH HYPERGLYCEMIA: ICD-10-CM

## 2023-02-10 DIAGNOSIS — E10.40 TYPE 1 DIABETES MELLITUS WITH DIABETIC NEUROPATHY: ICD-10-CM

## 2023-02-10 DIAGNOSIS — L89.619 PRESSURE INJURY OF SKIN OF RIGHT HEEL, UNSPECIFIED INJURY STAGE: ICD-10-CM

## 2023-02-10 DIAGNOSIS — E10.65 UNCONTROLLED TYPE 1 DIABETES MELLITUS WITH HYPERGLYCEMIA: Primary | ICD-10-CM

## 2023-02-10 PROCEDURE — 99214 OFFICE O/P EST MOD 30 MIN: CPT | Performed by: NURSE PRACTITIONER

## 2023-02-10 PROCEDURE — 3046F HEMOGLOBIN A1C LEVEL >9.0%: CPT | Performed by: NURSE PRACTITIONER

## 2023-02-10 RX ORDER — CETIRIZINE HYDROCHLORIDE 10 MG/1
TABLET ORAL
COMMUNITY
Start: 2023-02-07

## 2023-02-10 RX ORDER — ALBUTEROL SULFATE 90 UG/1
AEROSOL, METERED RESPIRATORY (INHALATION)
COMMUNITY
Start: 2022-12-27

## 2023-02-10 NOTE — PATIENT INSTRUCTIONS
Increase the Lantus to 20 units every evening    You may give her an additional 5 units of Lantus tonight and then wait until tomorrow evening to start 20 units every night    Use the following sliding scale with your NovoLog insulin before each meal:    If blood glucose is less than 150 mg/dl - 0 units  If blood glucose is between 151 and 175 - 2 units  If blood glucose is between 176 and 200 - 3 units  If blood glucose is between 201 and 225 - 4 units  If blood glucose is between 226 and 250 - 5 units  If blood glucose is between 251 and 275 - 6 units  If blood glucose is between 276 and 300 - 7 units  If blood glucose is between 301 and 325 - 8 units  If blood glucose is between 326 and 350 - 9 units  If blood glucose is 351 or above - 10 units

## 2023-02-10 NOTE — PROGRESS NOTES
Chief Complaint  Diabetes (Follow up, med mgt)    Referred By: No ref. provider found    Subjective          Xena Briseno presents to [facility] for diabetes medication management    History of Present Illness    Visit type:  follow-up  Diabetes type:  Type 1  Current diabetes status/concerns/issues: She is companied by her  at today's visit.  The patient and her  admit to her still struggling with confusion about taking her medications.  She voices a lot of anxiety about having hypoglycemia.  She did receive her vu continuous glucose sensor.  She states when she tried to apply the second sensor she made some sort of mistake and it did not function.  She has been off of the sensor for 1 week but she should be able to get it refilled on Monday.      Other health concerns: She is having a lot of swelling in her feet and ankles.  She has not f/u with her cardiologists since having her MI  Current Diabetes symptoms:    Polyuria: Yes   Polydipsia: Yes   Polyphagia: No   Blurred vision: No   Excessive fatigue: Yes   Known Diabetes complications:  Neuro: Neuropathy with numbness and pain in the feet  Renal: None  Eyes: None  Amputation/Wounds: Right heel  GI: gas and bloating and irregular bowel movements  Cardiovascular: HTN and hyperlipidemia, MI  ED: N/A   Other: None  Hypoglycemia:  Level 1 hypoglycemia (54 mg/dL - 70 mg/dL); Frequency - her  reports her having lows in the 60s at times  Hypoglycemia Symptoms:  shaking/tremors  Current diabetes treatment:  Lantus 15 units in the AM; Novolog using sliding scale  Blood glucose device:  Meter  Blood glucose monitoring frequency:  4-5  Blood glucose range/average:  300 - 400s at times  Diet:  Avoids sugary drinks  Activity/Exercise:  None    Past Medical History:   Diagnosis Date   • Anxiety    • Arthritis    • Asthma    • Depression    • Diabetes mellitus type I (HCC)    • GERD (gastroesophageal reflux disease)     • Hyperlipidemia    • Hypertension    • Sleep apnea      Past Surgical History:   Procedure Laterality Date   • CARDIAC CATHETERIZATION N/A 2023    Procedure: Left Heart Cath;  Surgeon: Santosh Tran MD;  Location:  [facility];  Service: Cardiology;  Laterality: N/A;   • CARDIAC CATHETERIZATION N/A 2023    Procedure: Coronary angiography;  Surgeon: Santosh Tran MD;  Location:  [facility];  Service: Cardiology;  Laterality: N/A;   • CARDIAC CATHETERIZATION N/A 2023    Procedure: Percutaneous Coronary Intervention;  Surgeon: Santosh Tran MD;  Location:  [facility];  Service: Cardiology;  Laterality: N/A;   • CARDIAC CATHETERIZATION N/A 2023    Procedure: Stent JALIL coronary;  Surgeon: Santosh Tran MD;  Location:  [facility];  Service: Cardiology;  Laterality: N/A;   •  SECTION     • CHOLECYSTECTOMY     • ENDOSCOPY     • SPINE SURGERY       Family History   Problem Relation Age of Onset   • Heart disease Mother    • Diabetes Father    • Diabetes Brother    • Stroke Paternal Grandmother      Social History     Socioeconomic History   • Marital status: Single   • Number of children: 2   Tobacco Use   • Smoking status: Every Day   • Smokeless tobacco: Never   Vaping Use   • Vaping Use: Never used   Substance and Sexual Activity   • Alcohol use: Not Currently   • Drug use: Never   • Sexual activity: Defer     Allergies   Allergen Reactions   • Statins Angioedema       Current Outpatient Medications:   •  Accu-Chek Softclix Lancets lancets, 102 each by Other route 4 (Four) Times a Day. Use as instructed, Disp: 102 each, Rfl: 5  •  albuterol sulfate  (90 Base) MCG/ACT inhaler, EVERY 4 HOURS as needed for soa, Disp: , Rfl:   •  aspirin 81 MG EC tablet, Take 1 tablet by mouth Daily., Disp: 30 tablet, Rfl: 3  •  cetirizine (zyrTEC) 10 MG tablet, , Disp: , Rfl:   •  Continuous Blood Gluc Sensor (FreeStyle Rickey 2 Sensor) misc,  "1 each Every 14 (Fourteen) Days., Disp: 2 each, Rfl: 11  •  glucagon (GlucaGen HypoKit) 1 MG injection, GlucaGen HypoKit 1 mg Injection, Disp: , Rfl:   •  glucose blood test strip, Use to test blood glucose before meals and bedtime, Disp: 100 each, Rfl: 12  •  insulin aspart (NovoLOG FlexPen) 100 UNIT/ML solution pen-injector sc pen, Inject 0-14 Units under the skin into the appropriate area as directed 4 (Four) Times a Day With Meals & at Bedtime., Disp: 15 mL, Rfl: 12  •  Insulin Glargine (LANTUS SOLOSTAR) 100 UNIT/ML injection pen, Inject 15 Units under the skin into the appropriate area as directed Every Night., Disp: 15 mL, Rfl: 12  •  Insulin Pen Needle (Unifine Pen Needles) 32G X 4 MM misc, use as directed to inject insulin four times daily, Disp: 100 each, Rfl: 5  •  metoprolol tartrate (LOPRESSOR) 25 MG tablet, Take 0.5 tablets by mouth Every 12 (Twelve) Hours., Disp: 60 tablet, Rfl: 3  •  ticagrelor (BRILINTA) 90 MG tablet tablet, Take 1 tablet by mouth 2 (Two) Times a Day., Disp: 60 tablet, Rfl: 5    Review of Systems   Constitutional: Positive for activity change, appetite change, fatigue and unexpected weight loss (3 pounds). Negative for unexpected weight gain.   Eyes: Negative for blurred vision and visual disturbance.   Gastrointestinal: Negative for abdominal pain, constipation, diarrhea, nausea, vomiting, GERD and indigestion.   Endocrine: Positive for polydipsia and polyuria. Negative for polyphagia.   Neurological: Positive for numbness (Feet and lower legs).        Objective     Vitals:    02/10/23 1319   BP: 119/75   BP Location: Right arm   Patient Position: Sitting   Cuff Size: Adult   Pulse: 86   SpO2: 99%   Weight: 61.2 kg (135 lb)   Height: 165.1 cm (65\")   PainSc:   6     Body mass index is 22.47 kg/m².    Physical Exam  Constitutional:       Appearance: Normal appearance. She is normal weight.   HENT:      Head: Normocephalic and atraumatic.      Right Ear: External ear normal.      Left " Ear: External ear normal.      Nose: Nose normal.   Eyes:      Extraocular Movements: Extraocular movements intact.      Conjunctiva/sclera: Conjunctivae normal.   Pulmonary:      Effort: Pulmonary effort is normal.   Musculoskeletal:         General: Normal range of motion.      Cervical back: Normal range of motion.      Right lower leg: Edema (3+) present.      Left lower leg: Edema (3+) present.        Feet:    Feet:      Right foot:      Toenail Condition: Right toenails are abnormally thick. Fungal disease present.  Skin:     General: Skin is warm and dry.      Comments: She has redness and edema around the distal end of the middle finger of her right hand.  She states she has had to have her finger lanced and drained in the past.   Neurological:      General: No focal deficit present.      Mental Status: She is alert. Mental status is at baseline.      Comments: She has some mild confusion   Psychiatric:         Mood and Affect: Mood normal.         Behavior: Behavior normal.         Thought Content: Thought content normal.         Judgment: Judgment normal.         Result Review :   The following data was reviewed by: ARACELI Sotomayor on 02/10/2023:    Most Recent A1C    HGBA1C Most Recent 1/14/23   Hemoglobin A1C 9.80 (A)   (A) Abnormal value              A1C Last 3 Results    HGBA1C Last 3 Results 7/29/22 1/13/23 1/14/23   Hemoglobin A1C 7.3 9.2 (A) 9.80 (A)   (A) Abnormal value            Her most recent A1c was collected on 1/14/2023 and was 9.8% indicating uncontrolled type 1 diabetes.  This is up from the prior result of 7.3 collected and July 2022            Assessment: The patient's general appearance at today's appointment is improved from the last visit.  She does still have confusion when trying to answer questions.  She is anxious about hypoglycemia so she is sometimes not taking her mealtime insulin unless her  is at home with her.  Her primary concern is leg aches and pains with  edema from the knees down.  She has 3+ edema in both feet.  She also complains of episodes of dizziness at times she has not followed up with her cardiologist since having her MI recently.  She also has a wound on her right heel that is concerning as well as a fungal appearing toenail on the right fifth toe that she says is painful.  She has swelling in her third finger of the right hand.      Diagnoses and all orders for this visit:    1. Uncontrolled type 1 diabetes mellitus with hyperglycemia (HCC) (Primary)    2. Type 1 diabetes mellitus with diabetic neuropathy (HCC)  -     Ambulatory Referral to Podiatry    3. Pressure injury of skin of right heel, unspecified injury stage  -     Ambulatory Referral to Podiatry    Other orders  -     glucose blood test strip; Use to test blood glucose before meals and bedtime  Dispense: 100 each; Refill: 12        Plan: In regards to her diabetes the patient was instructed to increase her Lantus to taking 20 units once a day.  She was advised to move it to the evening so it is more consistent.  She was also provided an updated sliding scale where she will correct for glucose levels greater than 150 mg/dL starting with 2 units and increasing by 1 unit for 25 mg/dL thereafter.    We will send referral to podiatry services for evaluation of the wound on the right heel as well as the right fifth toe nail issues.  She was urged to monitor the swelling in the right middle finger to see if this will heal on its own.  If it worsens in redness or swelling and she is urged to go to acute care for I&D again.    The patient is strongly encouraged to make an appointment with her cardiologist for follow-up regarding the excessive edema in the lower extremities and her complaints of dizziness    The patient will monitor her blood glucose levels 3-4 times a day.  If she develops problematic hyperglycemia or hypoglycemia or adverse drug reactions, she will contact the office for further  instructions.        Follow Up     Return in about 3 months (around 5/10/2023) for Medication Management.    Patient was given instructions and counseling regarding her condition or for health maintenance advice. Please see specific information pulled into the AVS if appropriate.     Jaquelin Grover, ARACELI  02/10/2023      Dictated Utilizing Dragon Dictation.  Please note that portions of this note were completed with a voice recognition program.  Part of this note may be an electronic transcription/translation of spoken language to printed text using the Dragon Dictation System.

## 2023-02-22 PROBLEM — F17.200 TOBACCO DEPENDENCE SYNDROME: Status: ACTIVE | Noted: 2019-08-23

## 2023-02-22 PROBLEM — J45.909 ASTHMA: Status: ACTIVE | Noted: 2019-08-23

## 2023-02-22 PROBLEM — R12 HEARTBURN: Status: ACTIVE | Noted: 2019-08-23

## 2023-02-22 PROBLEM — M62.830 SPASM OF BACK MUSCLES: Status: ACTIVE | Noted: 2022-02-21

## 2023-02-22 PROBLEM — G43.909 MIGRAINE: Status: ACTIVE | Noted: 2019-08-23

## 2023-02-22 PROBLEM — F41.9 ANXIETY: Status: ACTIVE | Noted: 2019-08-23

## 2023-02-22 PROBLEM — F41.0 PANIC DISORDER: Status: ACTIVE | Noted: 2019-08-23

## 2023-02-22 PROBLEM — E10.10: Status: ACTIVE | Noted: 2023-02-22

## 2023-02-22 PROBLEM — M25.529 PAIN IN ELBOW: Status: ACTIVE | Noted: 2019-08-23

## 2023-02-22 PROBLEM — D64.9 ANEMIA: Status: ACTIVE | Noted: 2021-02-05

## 2023-02-22 PROBLEM — R61 NIGHT SWEATS: Status: ACTIVE | Noted: 2019-08-23

## 2023-02-22 PROBLEM — R63.5 WEIGHT GAIN: Status: ACTIVE | Noted: 2019-08-23

## 2023-02-22 PROBLEM — I10 HYPERTENSIVE DISORDER: Status: ACTIVE | Noted: 2021-02-05

## 2023-02-22 PROBLEM — H53.9 DISORDER OF VISION: Status: ACTIVE | Noted: 2019-08-23

## 2023-02-22 PROBLEM — M25.559 HIP PAIN: Status: ACTIVE | Noted: 2019-08-23

## 2023-02-22 PROBLEM — M54.9 CHRONIC BACK PAIN: Status: ACTIVE | Noted: 2019-08-23

## 2023-02-22 PROBLEM — M51.37 DEGENERATION OF LUMBOSACRAL INTERVERTEBRAL DISC: Status: ACTIVE | Noted: 2018-09-25

## 2023-02-22 PROBLEM — M19.90 ARTHRITIS: Status: ACTIVE | Noted: 2019-08-23

## 2023-02-22 PROBLEM — S42.90XA FRACTURE OF SHOULDER: Status: ACTIVE | Noted: 2019-08-19

## 2023-02-22 PROBLEM — M43.16 SPONDYLOLISTHESIS OF LUMBAR REGION: Status: ACTIVE | Noted: 2022-11-03

## 2023-02-22 PROBLEM — R51.9 HEADACHE: Status: ACTIVE | Noted: 2019-08-23

## 2023-02-22 PROBLEM — Z79.4 ENCOUNTER FOR LONG-TERM (CURRENT) USE OF INSULIN: Status: ACTIVE | Noted: 2018-09-25

## 2023-02-22 PROBLEM — E78.5 HYPERLIPIDEMIA: Status: ACTIVE | Noted: 2019-08-23

## 2023-02-22 PROBLEM — M47.816 OSTEOARTHRITIS OF FACET JOINT OF LUMBAR SPINE: Status: ACTIVE | Noted: 2018-09-25

## 2023-02-22 PROBLEM — G89.29 CHRONIC BACK PAIN: Status: ACTIVE | Noted: 2019-08-23

## 2023-02-22 PROBLEM — J30.2 SEASONAL ALLERGIES: Status: ACTIVE | Noted: 2019-08-23

## 2023-02-22 PROBLEM — G47.00 INSOMNIA: Status: ACTIVE | Noted: 2019-08-23

## 2023-03-22 RX ORDER — INSULIN GLARGINE 100 [IU]/ML
INJECTION, SOLUTION SUBCUTANEOUS
Qty: 15 ML | Refills: 2 | Status: SHIPPED | OUTPATIENT
Start: 2023-03-22

## 2023-05-04 ENCOUNTER — HOSPITAL ENCOUNTER (INPATIENT)
Facility: HOSPITAL | Age: 48
LOS: 9 days | Discharge: HOME | DRG: 885 | End: 2023-05-13
Attending: PSYCHIATRY & NEUROLOGY | Admitting: PSYCHIATRY & NEUROLOGY
Payer: MEDICARE

## 2023-05-04 DIAGNOSIS — F31.81 BIPOLAR 2 DISORDER: Primary | ICD-10-CM

## 2023-05-04 PROBLEM — F32.9 MAJOR DEPRESSION: Status: ACTIVE | Noted: 2023-05-04

## 2023-05-04 LAB
ALBUMIN SERPL-MCNC: 3.3 G/DL (ref 3.5–5.2)
ALBUMIN/GLOB SERPL: 1.3 G/DL
ALP SERPL-CCNC: 104 U/L (ref 39–117)
ALT SERPL W P-5'-P-CCNC: 33 U/L (ref 1–33)
AMPHET+METHAMPHET UR QL: NEGATIVE
ANION GAP SERPL CALCULATED.3IONS-SCNC: 13.4 MMOL/L (ref 5–15)
APAP SERPL-MCNC: <5 MCG/ML (ref 0–30)
AST SERPL-CCNC: 39 U/L (ref 1–32)
BARBITURATES UR QL SCN: NEGATIVE
BASOPHILS # BLD AUTO: 0.03 10*3/MM3 (ref 0–0.2)
BASOPHILS NFR BLD AUTO: 0.3 % (ref 0–1.5)
BENZODIAZ UR QL SCN: POSITIVE
BILIRUB SERPL-MCNC: 0.3 MG/DL (ref 0–1.2)
BILIRUB UR QL STRIP: NEGATIVE
BUN SERPL-MCNC: 10 MG/DL (ref 6–20)
BUN/CREAT SERPL: 14.5 (ref 7–25)
CALCIUM SPEC-SCNC: 8.9 MG/DL (ref 8.6–10.5)
CANNABINOIDS SERPL QL: NEGATIVE
CHLORIDE SERPL-SCNC: 99 MMOL/L (ref 98–107)
CHOLEST SERPL-MCNC: 169 MG/DL (ref 0–200)
CLARITY UR: CLEAR
CO2 SERPL-SCNC: 23.6 MMOL/L (ref 22–29)
COCAINE UR QL: NEGATIVE
COLOR UR: YELLOW
CREAT SERPL-MCNC: 0.69 MG/DL (ref 0.57–1)
DEPRECATED RDW RBC AUTO: 46.5 FL (ref 37–54)
EGFRCR SERPLBLD CKD-EPI 2021: 107.2 ML/MIN/1.73
EOSINOPHIL # BLD AUTO: 0.02 10*3/MM3 (ref 0–0.4)
EOSINOPHIL NFR BLD AUTO: 0.2 % (ref 0.3–6.2)
ERYTHROCYTE [DISTWIDTH] IN BLOOD BY AUTOMATED COUNT: 12.8 % (ref 12.3–15.4)
ETHANOL BLD-MCNC: <10 MG/DL (ref 0–10)
ETHANOL UR QL: <0.01 %
GLOBULIN UR ELPH-MCNC: 2.5 GM/DL
GLUCOSE BLDC GLUCOMTR-MCNC: 344 MG/DL (ref 70–99)
GLUCOSE BLDC GLUCOMTR-MCNC: 370 MG/DL (ref 70–99)
GLUCOSE BLDC GLUCOMTR-MCNC: 398 MG/DL (ref 70–99)
GLUCOSE SERPL-MCNC: 380 MG/DL (ref 65–99)
GLUCOSE UR STRIP-MCNC: ABNORMAL MG/DL
HCT VFR BLD AUTO: 35.6 % (ref 34–46.6)
HDLC SERPL-MCNC: 46 MG/DL (ref 40–60)
HGB BLD-MCNC: 12.4 G/DL (ref 12–15.9)
HGB UR QL STRIP.AUTO: NEGATIVE
IMM GRANULOCYTES # BLD AUTO: 0.05 10*3/MM3 (ref 0–0.05)
IMM GRANULOCYTES NFR BLD AUTO: 0.5 % (ref 0–0.5)
KETONES UR QL STRIP: ABNORMAL
LDLC SERPL CALC-MCNC: 102 MG/DL (ref 0–100)
LDLC/HDLC SERPL: 2.17 {RATIO}
LEUKOCYTE ESTERASE UR QL STRIP.AUTO: NEGATIVE
LYMPHOCYTES # BLD AUTO: 1.73 10*3/MM3 (ref 0.7–3.1)
LYMPHOCYTES NFR BLD AUTO: 16.3 % (ref 19.6–45.3)
MCH RBC QN AUTO: 34.3 PG (ref 26.6–33)
MCHC RBC AUTO-ENTMCNC: 34.8 G/DL (ref 31.5–35.7)
MCV RBC AUTO: 98.6 FL (ref 79–97)
METHADONE UR QL SCN: NEGATIVE
MONOCYTES # BLD AUTO: 0.35 10*3/MM3 (ref 0.1–0.9)
MONOCYTES NFR BLD AUTO: 3.3 % (ref 5–12)
NEUTROPHILS NFR BLD AUTO: 79.4 % (ref 42.7–76)
NEUTROPHILS NFR BLD AUTO: 8.42 10*3/MM3 (ref 1.7–7)
NITRITE UR QL STRIP: NEGATIVE
NRBC BLD AUTO-RTO: 0 /100 WBC (ref 0–0.2)
OPIATES UR QL: NEGATIVE
OXYCODONE UR QL SCN: NEGATIVE
PH UR STRIP.AUTO: 5.5 [PH] (ref 5–8)
PLATELET # BLD AUTO: 128 10*3/MM3 (ref 140–450)
PMV BLD AUTO: 10.2 FL (ref 6–12)
POTASSIUM SERPL-SCNC: 4.6 MMOL/L (ref 3.5–5.2)
PROT SERPL-MCNC: 5.8 G/DL (ref 6–8.5)
PROT UR QL STRIP: NEGATIVE
RBC # BLD AUTO: 3.61 10*6/MM3 (ref 3.77–5.28)
SODIUM SERPL-SCNC: 136 MMOL/L (ref 136–145)
SP GR UR STRIP: 1.02 (ref 1–1.03)
T4 FREE SERPL-MCNC: 1.11 NG/DL (ref 0.93–1.7)
TRIGL SERPL-MCNC: 117 MG/DL (ref 0–150)
TSH SERPL DL<=0.05 MIU/L-ACNC: 2.24 UIU/ML (ref 0.27–4.2)
UROBILINOGEN UR QL STRIP: ABNORMAL
VLDLC SERPL-MCNC: 21 MG/DL (ref 5–40)
WBC NRBC COR # BLD: 10.6 10*3/MM3 (ref 3.4–10.8)

## 2023-05-04 PROCEDURE — 80307 DRUG TEST PRSMV CHEM ANLYZR: CPT | Performed by: PSYCHIATRY & NEUROLOGY

## 2023-05-04 PROCEDURE — 80061 LIPID PANEL: CPT | Performed by: PSYCHIATRY & NEUROLOGY

## 2023-05-04 PROCEDURE — 93005 ELECTROCARDIOGRAM TRACING: CPT | Performed by: STUDENT IN AN ORGANIZED HEALTH CARE EDUCATION/TRAINING PROGRAM

## 2023-05-04 PROCEDURE — 84443 ASSAY THYROID STIM HORMONE: CPT | Performed by: PSYCHIATRY & NEUROLOGY

## 2023-05-04 PROCEDURE — 99222 1ST HOSP IP/OBS MODERATE 55: CPT | Performed by: PHYSICIAN ASSISTANT

## 2023-05-04 PROCEDURE — 82948 REAGENT STRIP/BLOOD GLUCOSE: CPT

## 2023-05-04 PROCEDURE — 85025 COMPLETE CBC W/AUTO DIFF WBC: CPT | Performed by: PSYCHIATRY & NEUROLOGY

## 2023-05-04 PROCEDURE — 63710000001 INSULIN DETEMIR PER 5 UNITS: Performed by: PHYSICIAN ASSISTANT

## 2023-05-04 PROCEDURE — 84439 ASSAY OF FREE THYROXINE: CPT | Performed by: PSYCHIATRY & NEUROLOGY

## 2023-05-04 PROCEDURE — 83036 HEMOGLOBIN GLYCOSYLATED A1C: CPT | Performed by: PHYSICIAN ASSISTANT

## 2023-05-04 PROCEDURE — 80053 COMPREHEN METABOLIC PANEL: CPT | Performed by: PSYCHIATRY & NEUROLOGY

## 2023-05-04 PROCEDURE — 63710000001 INSULIN LISPRO (HUMAN) PER 5 UNITS: Performed by: PHYSICIAN ASSISTANT

## 2023-05-04 PROCEDURE — 80143 DRUG ASSAY ACETAMINOPHEN: CPT | Performed by: PSYCHIATRY & NEUROLOGY

## 2023-05-04 PROCEDURE — 81003 URINALYSIS AUTO W/O SCOPE: CPT | Performed by: PSYCHIATRY & NEUROLOGY

## 2023-05-04 PROCEDURE — 82077 ASSAY SPEC XCP UR&BREATH IA: CPT | Performed by: PSYCHIATRY & NEUROLOGY

## 2023-05-04 RX ORDER — DIPHENHYDRAMINE HYDROCHLORIDE 50 MG/ML
50 INJECTION INTRAMUSCULAR; INTRAVENOUS EVERY 4 HOURS PRN
Status: DISCONTINUED | OUTPATIENT
Start: 2023-05-04 | End: 2023-05-13 | Stop reason: HOSPADM

## 2023-05-04 RX ORDER — LORAZEPAM 2 MG/ML
2 INJECTION INTRAMUSCULAR EVERY 4 HOURS PRN
Status: ACTIVE | OUTPATIENT
Start: 2023-05-04 | End: 2023-05-11

## 2023-05-04 RX ORDER — DEXTROSE MONOHYDRATE 25 G/50ML
25 INJECTION, SOLUTION INTRAVENOUS
Status: DISCONTINUED | OUTPATIENT
Start: 2023-05-04 | End: 2023-05-10

## 2023-05-04 RX ORDER — TRAZODONE HYDROCHLORIDE 50 MG/1
100 TABLET ORAL NIGHTLY PRN
Status: DISCONTINUED | OUTPATIENT
Start: 2023-05-04 | End: 2023-05-11

## 2023-05-04 RX ORDER — ACETAMINOPHEN 325 MG/1
650 TABLET ORAL EVERY 6 HOURS PRN
Status: DISCONTINUED | OUTPATIENT
Start: 2023-05-04 | End: 2023-05-13 | Stop reason: HOSPADM

## 2023-05-04 RX ORDER — LORAZEPAM 0.5 MG/1
1 TABLET ORAL ONCE
Status: COMPLETED | OUTPATIENT
Start: 2023-05-05 | End: 2023-05-05

## 2023-05-04 RX ORDER — ALUMINA, MAGNESIA, AND SIMETHICONE 2400; 2400; 240 MG/30ML; MG/30ML; MG/30ML
15 SUSPENSION ORAL EVERY 6 HOURS PRN
Status: DISCONTINUED | OUTPATIENT
Start: 2023-05-04 | End: 2023-05-13 | Stop reason: HOSPADM

## 2023-05-04 RX ORDER — CETIRIZINE HYDROCHLORIDE 10 MG/1
10 TABLET ORAL NIGHTLY
Status: DISCONTINUED | OUTPATIENT
Start: 2023-05-04 | End: 2023-05-13 | Stop reason: HOSPADM

## 2023-05-04 RX ORDER — INSULIN LISPRO 100 [IU]/ML
3-14 INJECTION, SOLUTION INTRAVENOUS; SUBCUTANEOUS
Status: DISCONTINUED | OUTPATIENT
Start: 2023-05-04 | End: 2023-05-05

## 2023-05-04 RX ORDER — CITALOPRAM 20 MG/1
20 TABLET ORAL DAILY
Status: DISCONTINUED | OUTPATIENT
Start: 2023-05-05 | End: 2023-05-05

## 2023-05-04 RX ORDER — DIPHENHYDRAMINE HCL 50 MG
50 CAPSULE ORAL EVERY 4 HOURS PRN
Status: DISCONTINUED | OUTPATIENT
Start: 2023-05-04 | End: 2023-05-13 | Stop reason: HOSPADM

## 2023-05-04 RX ORDER — HALOPERIDOL 5 MG/1
5 TABLET ORAL EVERY 4 HOURS PRN
Status: DISCONTINUED | OUTPATIENT
Start: 2023-05-04 | End: 2023-05-13 | Stop reason: HOSPADM

## 2023-05-04 RX ORDER — NICOTINE 21 MG/24HR
1 PATCH, TRANSDERMAL 24 HOURS TRANSDERMAL
Status: DISCONTINUED | OUTPATIENT
Start: 2023-05-04 | End: 2023-05-05

## 2023-05-04 RX ORDER — HALOPERIDOL 5 MG/ML
5 INJECTION INTRAMUSCULAR EVERY 4 HOURS PRN
Status: DISCONTINUED | OUTPATIENT
Start: 2023-05-04 | End: 2023-05-13 | Stop reason: HOSPADM

## 2023-05-04 RX ORDER — LORAZEPAM 2 MG/1
2 TABLET ORAL EVERY 4 HOURS PRN
Status: DISCONTINUED | OUTPATIENT
Start: 2023-05-04 | End: 2023-05-10 | Stop reason: SDUPTHER

## 2023-05-04 RX ORDER — NICOTINE POLACRILEX 4 MG
15 LOZENGE BUCCAL
Status: DISCONTINUED | OUTPATIENT
Start: 2023-05-04 | End: 2023-05-10

## 2023-05-04 RX ADMIN — ASPIRIN 81 MG: 81 TABLET, COATED ORAL at 21:12

## 2023-05-04 RX ADMIN — INSULIN DETEMIR 20 UNITS: 100 INJECTION, SOLUTION SUBCUTANEOUS at 20:44

## 2023-05-04 RX ADMIN — CETIRIZINE HYDROCHLORIDE 10 MG: 10 TABLET, FILM COATED ORAL at 21:15

## 2023-05-04 RX ADMIN — TICAGRELOR 90 MG: 90 TABLET ORAL at 21:12

## 2023-05-04 RX ADMIN — INSULIN LISPRO 12 UNITS: 100 INJECTION, SOLUTION INTRAVENOUS; SUBCUTANEOUS at 20:43

## 2023-05-04 RX ADMIN — NICOTINE 1 PATCH: 21 PATCH, EXTENDED RELEASE TRANSDERMAL at 21:59

## 2023-05-04 RX ADMIN — METOPROLOL TARTRATE 12.5 MG: 25 TABLET, FILM COATED ORAL at 21:12

## 2023-05-04 NOTE — CONSULTS
[facility]   Hospitalist Consult Note  Date: 2023   Patient Name: Xena Briseno  : 1975  MRN: [id number]  Primary Care Physician:  Kim Damian APRN  Referring Physician: Jeremy Meyers MD  Date of admission: 2023    Subjective   Subjective     Reason for Consult/ Chief Complaint: Diabetes management    HPI:    Xena Briseno is a 47 y.o. female who  has a past medical history of Anxiety, Arthritis, Asthma, CAD (coronary artery disease), Depression, Diabetes mellitus type I, GERD (gastroesophageal reflux disease), Hyperlipidemia, Hypertension, and Sleep apnea.  Patient presented to [facility] [employer] today at the recommendation of the police department.  She is very anxious, states that she has had a rough few months.    At time my evaluation she denies any chest pain, shortness of breath, headache, or vision changes.  She states that she has been taking her medications as prescribed.        Personal History     Past Medical History:  Past Medical History:   Diagnosis Date   • Anxiety    • Arthritis    • Asthma    • CAD (coronary artery disease)    • Depression    • Diabetes mellitus type I    • GERD (gastroesophageal reflux disease)    • Hyperlipidemia    • Hypertension    • Sleep apnea        Past Surgical History:  Past Surgical History:   Procedure Laterality Date   • CARDIAC CATHETERIZATION N/A 2023    Procedure: Left Heart Cath;  Surgeon: Santosh Tran MD;  Location:  [facility];  Service: Cardiology;  Laterality: N/A;   • CARDIAC CATHETERIZATION N/A 2023    Procedure: Coronary angiography;  Surgeon: Santosh Tran MD;  Location:  [facility];  Service: Cardiology;  Laterality: N/A;   • CARDIAC CATHETERIZATION N/A 2023    Procedure: Percutaneous Coronary Intervention;  Surgeon: Santosh Tran MD;  Location:  [facility];  Service: Cardiology;  Laterality: N/A;   • CARDIAC CATHETERIZATION N/A 2023    Procedure: Stent JALIL  coronary;  Surgeon: Santosh Tran MD;  [facility];  Service: Cardiology;  Laterality: N/A;   •  SECTION     • CHOLECYSTECTOMY     • ENDOSCOPY     • SPINE SURGERY         Family History:   Family History   Problem Relation Age of Onset   • Heart disease Mother    • Diabetes Father    • Diabetes Brother    • Stroke Paternal Grandmother        Social History:    reports that she has been smoking. She has never used smokeless tobacco. She reports that she does not currently use alcohol. She reports that she does not use drugs.    Home Medications:  Accu-Chek Softclix Lancets, BASAGLAR KWIKPEN, FreeStyle Rickey 2 Sensor, Insulin Pen Needle, albuterol sulfate HFA, aspirin, cetirizine, glucagon, glucose blood, insulin aspart, metoprolol tartrate, and ticagrelor    Allergies:  Allergies   Allergen Reactions   • Doxycycline Swelling and Anaphylaxis   • Statins Angioedema       Review of Systems   All systems were reviewed and negative except for: Suicidal ideation, anxiety    Objective   Objective     Vitals:   Temp:  [98.2 °F (36.8 °C)] 98.2 °F (36.8 °C)  Heart Rate:  [90] 90  Resp:  [18] 18  BP: (122)/(76) 122/76    Physical Exam    Constitutional: Anxious, tearful, trembling   Eyes: Pupils equal, sclerae anicteric, no conjunctival injection   HENT: NCAT, mucous membranes moist   Neck: Supple, no thyromegaly, no lymphadenopathy, trachea midline   Respiratory: Clear to auscultation bilaterally, nonlabored respirations    Cardiovascular: RRR, no murmurs, rubs, or gallops, palpable pedal pulses bilaterally   Gastrointestinal: Positive bowel sounds, soft, nontender, nondistended   Musculoskeletal: 2+ pitting bilateral ankle edema, no clubbing or cyanosis to extremities   Psychiatric: Cooperative, anxious affect, tearful   Neurologic: Oriented x 3, strength symmetric in all extremities, Cranial Nerves grossly intact to confrontation, speech clear   Skin: No rashes     Imaging:   No results  found.    Result Review    Result Review:  I have personally reviewed the results from the time of this admission to 5/4/2023 20:30 EDT and agree with these findings:  [x]  Laboratory  [x]  Microbiology  [x]  Radiology  [x]  EKG/Telemetry   []  Cardiology/Vascular   []  Pathology  [x]  Old records  []  Other:      Assessment & Plan   Assessment / Plan     Assessment:   Type 1 diabetes mellitus  Hyperlipidemia  Hypertension  Coronary artery disease status post PCI  Depression  Anxiety  Suicidal ideation      Plan:      Will do sliding scale insulin as well as 20 units of Levemir nightly.  Monitor glucose and alter regimen as necessary.    Follow admission labs.  Would add on an A1c.    Reconcile and resume appropriate home medications including metoprolol, aspirin, and Brilinta as the patient has had a drug-eluting stent placed in January of this year.    We will add nicotine replacement therapy    Thank you for allowing us to participate in the care of this patient, we will follow along with you    DVT prophylaxis:  No DVT prophylaxis order currently exists.    CODE STATUS:     Full code        Electronically signed by FLOR Kent, 05/04/23, 7:39 PM EDT.

## 2023-05-05 PROBLEM — E10.42 POLYNEUROPATHY DUE TO TYPE 1 DIABETES MELLITUS: Status: ACTIVE | Noted: 2023-05-05

## 2023-05-05 PROBLEM — F33.1 MAJOR DEPRESSIVE DISORDER, RECURRENT EPISODE, MODERATE: Status: ACTIVE | Noted: 2023-05-05

## 2023-05-05 PROBLEM — F31.81 BIPOLAR 2 DISORDER: Status: ACTIVE | Noted: 2023-05-05

## 2023-05-05 PROBLEM — K21.9 GASTROESOPHAGEAL REFLUX DISEASE WITHOUT ESOPHAGITIS: Status: ACTIVE | Noted: 2023-05-05

## 2023-05-05 PROBLEM — E10.9 TYPE 1 DIABETES MELLITUS: Status: ACTIVE | Noted: 2023-05-05

## 2023-05-05 PROBLEM — E78.5 DYSLIPIDEMIA: Status: ACTIVE | Noted: 2023-05-05

## 2023-05-05 PROBLEM — J30.9 ALLERGIC RHINITIS: Status: ACTIVE | Noted: 2023-05-05

## 2023-05-05 LAB
FOLATE SERPL-MCNC: 6.52 NG/ML (ref 4.78–24.2)
GEN 5 2HR TROPONIN T REFLEX: 17 NG/L
GLUCOSE BLDC GLUCOMTR-MCNC: 143 MG/DL (ref 70–99)
GLUCOSE BLDC GLUCOMTR-MCNC: 247 MG/DL (ref 70–99)
GLUCOSE BLDC GLUCOMTR-MCNC: 270 MG/DL (ref 70–99)
GLUCOSE BLDC GLUCOMTR-MCNC: 332 MG/DL (ref 70–99)
GLUCOSE BLDC GLUCOMTR-MCNC: 67 MG/DL (ref 70–99)
HBA1C MFR BLD: 11.2 % (ref 4.8–5.6)
QT INTERVAL: 370 MS
TROPONIN T DELTA: 0 NG/L
TROPONIN T SERPL HS-MCNC: 17 NG/L
VIT B12 BLD-MCNC: 856 PG/ML (ref 211–946)
WHOLE BLOOD HOLD SPECIMEN: NORMAL

## 2023-05-05 PROCEDURE — 82607 VITAMIN B-12: CPT | Performed by: STUDENT IN AN ORGANIZED HEALTH CARE EDUCATION/TRAINING PROGRAM

## 2023-05-05 PROCEDURE — 63710000001 PROCHLORPERAZINE MALEATE PER 5 MG: Performed by: STUDENT IN AN ORGANIZED HEALTH CARE EDUCATION/TRAINING PROGRAM

## 2023-05-05 PROCEDURE — 84484 ASSAY OF TROPONIN QUANT: CPT | Performed by: STUDENT IN AN ORGANIZED HEALTH CARE EDUCATION/TRAINING PROGRAM

## 2023-05-05 PROCEDURE — 63710000001 INSULIN DETEMIR PER 5 UNITS: Performed by: INTERNAL MEDICINE

## 2023-05-05 PROCEDURE — 82948 REAGENT STRIP/BLOOD GLUCOSE: CPT

## 2023-05-05 PROCEDURE — 63710000001 INSULIN LISPRO (HUMAN) PER 5 UNITS: Performed by: INTERNAL MEDICINE

## 2023-05-05 PROCEDURE — 82746 ASSAY OF FOLIC ACID SERUM: CPT | Performed by: STUDENT IN AN ORGANIZED HEALTH CARE EDUCATION/TRAINING PROGRAM

## 2023-05-05 PROCEDURE — 99233 SBSQ HOSP IP/OBS HIGH 50: CPT | Performed by: INTERNAL MEDICINE

## 2023-05-05 RX ORDER — ARIPIPRAZOLE 5 MG/1
5 TABLET ORAL DAILY
Status: DISCONTINUED | OUTPATIENT
Start: 2023-05-05 | End: 2023-05-07

## 2023-05-05 RX ORDER — TIOTROPIUM BROMIDE INHALATION SPRAY 3.12 UG/1
2 SPRAY, METERED RESPIRATORY (INHALATION)
Status: ON HOLD | COMMUNITY
End: 2023-05-13 | Stop reason: SDUPTHER

## 2023-05-05 RX ORDER — FLUTICASONE PROPIONATE 50 MCG
2 SPRAY, SUSPENSION (ML) NASAL DAILY
Status: ON HOLD | COMMUNITY
End: 2023-05-13 | Stop reason: SDUPTHER

## 2023-05-05 RX ORDER — NICOTINE 21 MG/24HR
1 PATCH, TRANSDERMAL 24 HOURS TRANSDERMAL ONCE
Status: COMPLETED | OUTPATIENT
Start: 2023-05-05 | End: 2023-05-06

## 2023-05-05 RX ORDER — NICOTINE 21 MG/24HR
1 PATCH, TRANSDERMAL 24 HOURS TRANSDERMAL EVERY 24 HOURS
Status: DISCONTINUED | OUTPATIENT
Start: 2023-05-06 | End: 2023-05-09

## 2023-05-05 RX ORDER — PROCHLORPERAZINE MALEATE 5 MG/1
5 TABLET ORAL ONCE
Status: COMPLETED | OUTPATIENT
Start: 2023-05-05 | End: 2023-05-05

## 2023-05-05 RX ORDER — FOLIC ACID 1 MG/1
1 TABLET ORAL DAILY
Status: DISCONTINUED | OUTPATIENT
Start: 2023-05-05 | End: 2023-05-13 | Stop reason: HOSPADM

## 2023-05-05 RX ORDER — INSULIN LISPRO 100 [IU]/ML
2-7 INJECTION, SOLUTION INTRAVENOUS; SUBCUTANEOUS
Status: DISCONTINUED | OUTPATIENT
Start: 2023-05-05 | End: 2023-05-09

## 2023-05-05 RX ORDER — MONTELUKAST SODIUM 10 MG/1
10 TABLET ORAL NIGHTLY
Status: ON HOLD | COMMUNITY
End: 2023-05-13 | Stop reason: SDUPTHER

## 2023-05-05 RX ADMIN — METOPROLOL TARTRATE 12.5 MG: 25 TABLET, FILM COATED ORAL at 09:29

## 2023-05-05 RX ADMIN — TRAZODONE HYDROCHLORIDE 100 MG: 50 TABLET ORAL at 22:31

## 2023-05-05 RX ADMIN — Medication 1 MG: at 09:29

## 2023-05-05 RX ADMIN — CETIRIZINE HYDROCHLORIDE 10 MG: 10 TABLET, FILM COATED ORAL at 20:47

## 2023-05-05 RX ADMIN — INSULIN LISPRO 3 UNITS: 100 INJECTION, SOLUTION INTRAVENOUS; SUBCUTANEOUS at 11:54

## 2023-05-05 RX ADMIN — INSULIN DETEMIR 10 UNITS: 100 INJECTION, SOLUTION SUBCUTANEOUS at 20:49

## 2023-05-05 RX ADMIN — CITALOPRAM HYDROBROMIDE 20 MG: 20 TABLET ORAL at 09:29

## 2023-05-05 RX ADMIN — NICOTINE 1 PATCH: 21 PATCH, EXTENDED RELEASE TRANSDERMAL at 13:40

## 2023-05-05 RX ADMIN — TICAGRELOR 90 MG: 90 TABLET ORAL at 09:29

## 2023-05-05 RX ADMIN — PROCHLORPERAZINE MALEATE 5 MG: 5 TABLET ORAL at 00:46

## 2023-05-05 RX ADMIN — Medication 100 MG: at 09:29

## 2023-05-05 RX ADMIN — ARIPIPRAZOLE 5 MG: 5 TABLET ORAL at 11:54

## 2023-05-05 RX ADMIN — METOPROLOL TARTRATE 12.5 MG: 25 TABLET, FILM COATED ORAL at 20:47

## 2023-05-05 RX ADMIN — INSULIN LISPRO 5 UNITS: 100 INJECTION, SOLUTION INTRAVENOUS; SUBCUTANEOUS at 16:30

## 2023-05-05 RX ADMIN — ASPIRIN 81 MG: 81 TABLET, COATED ORAL at 09:30

## 2023-05-05 RX ADMIN — TICAGRELOR 90 MG: 90 TABLET ORAL at 20:49

## 2023-05-05 RX ADMIN — LORAZEPAM 1 MG: 0.5 TABLET ORAL at 00:46

## 2023-05-05 NOTE — CASE MANAGEMENT/SOCIAL WORK
"Collateral with pt spouse, he shares pt lives with him, states pt hs long history of mental illness and \"family problems.\" Spouse states multiple attempts to help pt, \"she never stays or refuses.\" Spouse shares  for 6 yeas, \"we  for while.\" Spouse shares pt has history of abusing prescription \"she got a prescription for Lortabs and Valium.\"     Spouse shares he will bring pt glasses and some clothing, he was informed of appropriate pt attire.  "

## 2023-05-05 NOTE — NURSING NOTE
"MIW, escorted to unit via [employer] Dept. Past medical history of Anxiety, Arthritis, Asthma, CAD (coronary artery disease), Depression, Diabetes mellitus type I, GERD (gastroesophageal reflux disease), Hyperlipidemia, Hypertension, and Sleep apnea. Recent cardiac caths with stent placement. Med consult ordered and followed by [facility] and Dr. Burk. Past Psych history includes an attempt at overdosing on pain pills \"years ago\" per pt. Pt presents as anxious, tearful, fidgets, shakes. Pt is poor historian. Pt repeats that she is \"scared to forget things.\" Pt is also preoccupied with how and when she will be able to go home. Pt upset at co-ed unit. Pt rates anxiety and depression at 10. Denies SI, HI, AVH.   "

## 2023-05-05 NOTE — CONSULTS
"Nutrition Services    Patient Name: Xena Briseno  YOB: 1975  MRN: [id number]  Admission date: 5/4/2023      CLINICAL NUTRITION ASSESSMENT      Reason for Assessment  MST score 2+     H&P:    Past Medical History:   Diagnosis Date   • Anxiety    • Arthritis    • Asthma    • CAD (coronary artery disease)    • Depression    • Diabetes mellitus type I    • GERD (gastroesophageal reflux disease)    • Hyperlipidemia    • Hypertension    • Sleep apnea         Current Problems:   Active Hospital Problems    Diagnosis    • **Major depression    • Major depressive disorder, recurrent episode, moderate    • Gastroesophageal reflux disease without esophagitis    • Type 1 diabetes mellitus    • Bipolar 2 disorder    • Hypertensive disorder    • Hyperlipidemia    • Seasonal allergies         Nutrition/Diet History         Narrative     RD assessed pt 2' to MST of 2, w/ 'unsure wt loss. PMH T1DM. Per chart review, pt's wt has been stable x 4 months. Pt's intake is 100% at this time. No acute nutrition concerns or interventions at this time. RD will continue to monitor per protocol.      Anthropometrics        Current Height, Weight Height: 165.1 cm (65\")  Weight: 59 kg (130 lb)   Current BMI Body mass index is 21.63 kg/m².       Weight Hx  Wt Readings from Last 30 Encounters:   05/04/23 2000 59 kg (130 lb)   02/10/23 1319 61.2 kg (135 lb)   01/18/23 0922 62.6 kg (138 lb)   01/17/23 0353 62.3 kg (137 lb 4.8 oz)   01/16/23 0525 61.8 kg (136 lb 3.9 oz)   01/15/23 2031 61.6 kg (135 lb 12.8 oz)   01/15/23 1203 62.1 kg (137 lb)   01/14/23 0928 62.3 kg (137 lb 5.6 oz)   01/13/23 1449 59.4 kg (131 lb)   07/29/22 1445 74.8 kg (165 lb)   05/13/22 1608 69.9 kg (154 lb)   04/20/22 1400 70.9 kg (156 lb 4.9 oz)   01/31/22 1143 72.2 kg (159 lb 2.8 oz)            Wt Change Observation Stable x 4 mo, -15.6% x 1 yr--clinically insignificant      Estimated/Assessed Needs       Energy Requirements 25-30 kcal/kg CBW   EST Needs " (kcal/day) 0922-5184       Protein Requirements 1.0 g/kg   EST Daily Needs (g/day) 59       Fluid Requirements 1 ml/kcal    Estimated Needs (mL/day) 0667-8219     Labs/Medications         Pertinent Labs Reviewed.   Results from last 7 days   Lab Units 05/04/23  1927   SODIUM mmol/L 136   POTASSIUM mmol/L 4.6   CHLORIDE mmol/L 99   CO2 mmol/L 23.6   BUN mg/dL 10   CREATININE mg/dL 0.69   CALCIUM mg/dL 8.9   BILIRUBIN mg/dL 0.3   ALK PHOS U/L 104   ALT (SGPT) U/L 33   AST (SGOT) U/L 39*   GLUCOSE mg/dL 380*     Results from last 7 days   Lab Units 05/04/23 1927   HEMOGLOBIN g/dL 12.4   HEMATOCRIT % 35.6   TRIGLYCERIDES mg/dL 117     No results found for: COVID19  Lab Results   Component Value Date    HGBA1C 11.20 (H) 05/04/2023         Pertinent Medications Reviewed.     Current Nutrition Orders & Evaluation of Intake       Oral Nutrition     Current PO Diet Diet: Diabetic Diets; Consistent Carbohydrate; Safe Tray; Texture: Regular Texture (IDDSI 7); Fluid Consistency: Thin (IDDSI 0)   Supplement No active supplement orders       Malnutrition Severity Assessment                Nutrition Diagnosis         Nutrition Dx Problem 1 No nutrition diagnosis at this time        Nutrition Intervention         No nutrition intervention at this time      Medical Nutrition Therapy/Nutrition Education          Learner     Readiness Patient  Education not indicated at this time     Method     Response N/A  N/A     Monitor/Evaluation        Monitor Per protocol, PO intake, Weight, POC/GOC       Nutrition Discharge Plan         No nutrition discharge needs identified at this time       Electronically signed by:  Rebeca Morataya RD  05/05/23 15:16 EDT

## 2023-05-05 NOTE — H&P
" [facility]   PSYCHIATRIC  HISTORY AND PHYSICAL    Patient Name: Xena Briseno  : 1975  MRN: [id number]  Primary Care Physician:  Kim Damian APRN  Date of admission: 2023    Subjective   Subjective     Chief Complaint: \" brought me, people at the hospital thought I was trying to harm myself.\"    HPI:     Xena Briseno is a 47 y.o. female for multiple medical problems and a long history depression that was admitted on a mental Inquest warrant.  Patient been treated for DKA in outlying facility.  She has a long history of medication noncompliance.    Patient states that she is not trying to harm herself.  Patient reports that she had no intention of harming herself but that she often gets confused and mixed up on her medications.  She has had multiple recent hospitalizations for DKA at [facility].    Patient reports that she has been very anxious.  She reports her anxiety is that she will be able to do things right.  She is also very worried and persistent In bringing up urinary incontinence.    She reports she is depressed and anxious.  She denies any auditory or visual hallucinations.  Patient is quite tearful throughout the exam and appears to be rather anxious and distraught.  She states that she has been more emotional at home including being more irritable.  She has been more tearful as well.  She reports poor memory.  Patient denies hallucinations and states that she does not see things that are not there but ask if other people see her.  Clarified that she does not think that she has been visible.  She reports that she is very easily dismissed, looked over, and being judged and this makes her upset.  She reports feelings of shame and guilt.  Patient denies suicidal ideation but did state early in the interview that she felt like she wanted to die.    He is difficult to engage and get a clear concise history from.  She has to sit and think about questions asked " for a while and formulate an answer, she speaks slowly and appears a little distracted or confused at times.  She has poor memory and concentration          Review of Systems:      CONSTITUTIONAL: Feels well denies any acute medical problems  HEENT: No visual problems, no hearing difficulty, no dysphasia,  LUNGS: no cough, no shortness of breath;  CARDIOVASCULAR: no palpitation, no chest pain,   GI: no abdominal pain, no nausea, no vomiting, no diarrhea, no constipation;  ENMA: no dysuria, no hematuria, no frequency or urgency,   MUSCULOSKELETAL: no joint pain, no swelling, no stiffness;  ENDOCRINE: no cold or heat intolerance;  HEMATOLOGY: no easy bruising or bleeding,   DERMATOLOGY: no skin rash, no pruritus;  NEUROLOGY: no syncope, no seizures, no numbness or tingling of hands, no   numbness or tingling of feet,   PSYCHIATRIC: As documented in HPI    Personal History     Past Medical History:   Diagnosis Date   • Anxiety    • Arthritis    • Asthma    • CAD (coronary artery disease)    • Depression    • Diabetes mellitus type I    • GERD (gastroesophageal reflux disease)    • Hyperlipidemia    • Hypertension    • Sleep apnea        Past Surgical History:   Procedure Laterality Date   • CARDIAC CATHETERIZATION N/A 1/16/2023    Procedure: Left Heart Cath;  Surgeon: Santosh Tran MD;  Location: [facility] INVASIVE LOCATION;  Service: Cardiology;  Laterality: N/A;   • CARDIAC CATHETERIZATION N/A 1/16/2023    Procedure: Coronary angiography;  Surgeon: Santosh Tran MD;  Location: [facility] CATH INVASIVE LOCATION;  Service: Cardiology;  Laterality: N/A;   • CARDIAC CATHETERIZATION N/A 1/16/2023    Procedure: Percutaneous Coronary Intervention;  Surgeon: Santosh Tran MD;  Location: [facility] CATH INVASIVE LOCATION;  Service: Cardiology;  Laterality: N/A;   • CARDIAC CATHETERIZATION N/A 1/16/2023    Procedure: Stent JALIL coronary;  Surgeon: Santosh Tran MD;  Location: [facility] CATH INVASIVE LOCATION;  Service: Cardiology;  Laterality:  "N/A;   •  SECTION     • CHOLECYSTECTOMY     • ENDOSCOPY     • SPINE SURGERY         Past Psychiatric History: States that she saw a psychiatrist years ago but cannot tell me who it was.  She reports she has a history of \"manic depression.\"  She does not know why she was ever given a diagnosis.  She reports that she was seen by Zoya Myles from [facility], it is unclear if she has been seen as an outpatient at [facility] or just seen in consultation by Zoya Myles when she was at [facility].  She reports a previous history of bipolar disorder but cannot give any specifics of why she was diagnosed with this.    Psychiatric Hospitalizations: Reports he has been in the hospital in the past in his alumnus of here but there is no record that I can find in [facility] or [employer].    Suicide Attempts: History of previous attempt    Prior Treatment and Medications Tried: Multiple medication trials including fluoxetine, sertraline, citalopram, Wellbutrin, duloxetine, buspirone among others.  She reports over these had unwanted side effects.  Reports that she has not done well on some these medications in the past and specifically recalls Cymbalta or citalopram \"making her crazy.\"  Also states that some of the other antidepressants may have had this effect.      Family History: family history includes Diabetes in her brother and father; Heart disease in her mother; Stroke in her paternal grandmother. Otherwise pertinent FHx was reviewed and not pertinent to current issue.    Family Substance Abuse History:None known to patient      Family Suicide History:None known to patient      Social History:     She was born and raised in [place].  Reports that she has 1/9 grade education she is  to her third  they have been  for 3 years.  She has 2 children.  She is currently unemployed.    No  service    She is spiritual    Denies history of abuse    Social History     Socioeconomic History "   • Marital status: Single   • Number of children: 2   Tobacco Use   • Smoking status: Every Day   • Smokeless tobacco: Never   Vaping Use   • Vaping Use: Never used   Substance and Sexual Activity   • Alcohol use: Not Currently   • Drug use: Never   • Sexual activity: Defer       Substance Abuse History: reports that she has been smoking. She has never used smokeless tobacco. She reports that she does not currently use alcohol. She reports that she does not use drugs.    Home Medications:   Accu-Chek Softclix Lancets, BASAGLAR KWIKPEN, FreeStyle Rickey 2 Sensor, Insulin Pen Needle, albuterol sulfate HFA, aspirin, cetirizine, glucagon, glucose blood, insulin aspart, metoprolol tartrate, and ticagrelor      Allergies:  Allergies   Allergen Reactions   • Doxycycline Swelling and Anaphylaxis   • Statins Angioedema       Objective   Objective     Vitals:   Temp:  [97.6 °F (36.4 °C)-98.2 °F (36.8 °C)] 97.6 °F (36.4 °C)  Heart Rate:  [78-90] 78  Resp:  [16-18] 16  BP: (116-122)/(74-76) 116/74    Physical Exam:      Has been seen by the hospitalist for management of medical issues  History and physical completed by hospitalist, findings verified    Cranial Nerves: Grossly intact    Mental Status Exam:     Awake, alert, difficult to engage female.  She appears older than stated age.  She is slow to respond and takes a long time to formulate answers to questions and appears to have some underlying confusion.  She is a limited historian.  Reports only a 9th grade education and may have slightly below average intelligence     She is oriented to month, year, place, and city.  She does not know the day.  She can identify 2 common objects without difficulty.  Her immediate recall is 3 out of 3 items or recall after 4 minutes is 2 out of 3 items.  She spells the word house forward on the first time without difficulty in spelling it backwards she gets the first 3 letters correct and then sits and thinks for long time and states that  "she cannot do it and scores a 2 out of 5.    Hygiene:   fair  Cooperation:  Cooperative, but difficult to engage and provides minimal responses but this is due more to mental status than unwillingness to talk  Eye Contact:  Good  Psychomotor Behavior:  Restless, anxious,  Affect:  Restricted and Tearful, congruent with stated mood  Mood: \"Really anxious, scared\"  Speech:  Normal and Minimal  Language: Appropriate, relevant  Thought Process:  Superficial, guarded, appears to be confused and her story changes or timeline is inconsistent  Thought Content:  Bizarre  Suicidal:  None and Denies but has made thoughts of wanting to die  Homicidal:  None  Hallucinations:  None  Delusion:  None  Memory:  Deficits  Orientation:  Person, Place, Time and Situation  Reliability:  fair  Insight:  Fair  Judgement:  Fair  Impulse Control:  Fair        Result Review    Result Review:  I have personally reviewed the results from the time of this admission to 5/5/2023 11:29 EDT and agree with these findings:  [x]  Laboratory  []  Microbiology  []  Radiology  []  EKG/Telemetry   []  Cardiology/Vascular   []  Pathology  []  Old records  []  Other:  Most notable findings include: Elevated glucose    Assessment & Plan   Assessment / Plan     Brief Patient Summary:  Xena Briseno is a 47 y.o. female who admitted on an MIW for possible self-harm and underlying confusion.  Patient also depressed with anxiety.    Active Hospital Problems:  Active Hospital Problems    Diagnosis    • **Major depression    • Major depressive disorder, recurrent episode, moderate    • Gastroesophageal reflux disease without esophagitis    • Type 1 diabetes mellitus    • Hypertensive disorder    • Hyperlipidemia    • Seasonal allergies        Plan:   Discontinue citalopram  Add aripiprazole for mood stabilization and depression  We will continue to evaluate, do not anticipate the need to move forward the MIW process should be able to help stabilize her before " time for court, or imagined patient with son and to continue treatment voluntarily  Admit for safety and stabilization and begin treatment for underlying mood disorder or psychosis with appropriate medications  Attempt to gain collateral information of possible  Work on safety plan  Provide supportive therapy  Patient to engage in all group and individual treatment modalities available including milieu therapy  Work on appropriate disposition follow-up  Estimated length of stay in hospital 4 to 5 days      DVT prophylaxis:  No DVT prophylaxis order currently exists.    CODE STATUS:    Code Status (Patient has no pulse and is not breathing): CPR (Attempt to Resuscitate)  Medical Interventions (Patient has pulse or is breathing): Full Support  Release to patient: Routine Release      Admission Status:  I believe this patient meets inpatient status.      Part of this note may be an electronic transcription/translation of spoken language to printed text using the Dragon dictation system.        Electronically signed by Jeremy Meyers MD, 05/05/23, 11:29 AM EDT.

## 2023-05-05 NOTE — PLAN OF CARE
Goal Outcome Evaluation:  Plan of Care Reviewed With: patient  Patient Agreement with Plan of Care: agrees     Patient was awake and alert upon assessment, pt has intense eye contact at times, slow low speech, mumbled speech, difficulty saying what she wants to say, hard to engage, able to needs known, contracts for safety, come to NS when needing things or for questions. Pt BG has been ranging from 67 to 332, see labs and eMAR. Pt reports anxiety and depression 10/10, denies SI and HI and denies A/V/H. Pt has problems with memory, has repeated questions and needs lot of reassurance. Pt is a poor historian, pt spoke to  several times today and requested him to bring her glasses. Pt has incontinence at times and wears brief. Pt is medication compliant. Will continue to monitor for changes in behavior and provide safe environment.

## 2023-05-05 NOTE — PROGRESS NOTES
[facility]   Hospitalist Progress Note  Date: 2023  Patient Name: Xena Briseno  : 1975  MRN: [id number]  Date of admission: 2023    Subjective   Subjective     Reason for consult: Management of type 1 diabetes mellitus    Interval Followup:   Hypoglycemia noted on a.m. labs.  Discussion had with patient who after questioning he admitted to not taking insulin at home.  Suspect patient may require lower dose than currently prescribed as she does not reliably use her insulin therapy.    Review of Systems   All systems reviewed and negative unless stated otherwise under subjective.    Objective   Objective     Vitals:   Temp:  [97.6 °F (36.4 °C)-98.2 °F (36.8 °C)] 97.6 °F (36.4 °C)  Heart Rate:  [78-90] 78  Resp:  [16-18] 16  BP: (116-122)/(74-76) 116/74  Physical Exam   Gen: No acute distress, sitting up on edge of bed  Resp: CTAB, No w/r/r, No respiratory distress appreciated  Card: RRR, No m/r/g  Abd: Soft, Nontender, Nondistended, + bowel sounds    Result Review    Result Review:  I have personally reviewed the results as below and agree with these findings:  []  Laboratory:   CMP        2023    05:15 2023    03:38 2023    19:27   CMP   Glucose 311   356   380     BUN 9   7   10     Creatinine 0.56   0.48   0.69     EGFR 112.7   117.0   107.2     Sodium 139   138   136     Potassium 3.5   3.4   4.6     Chloride 106   104   99     Calcium 7.8   7.9   8.9     Total Protein 4.7    5.8     Albumin 3.0    3.3     Globulin 1.7    2.5     Total Bilirubin <0.2    0.3     Alkaline Phosphatase 93    104     AST (SGOT) 23    39     ALT (SGPT) 27    33     Albumin/Globulin Ratio 1.8    1.3     BUN/Creatinine Ratio 16.1   14.6   14.5     Anion Gap 9.0   6.0   13.4       CBC        2023    05:15 2023    03:38 2023    19:27   CBC   WBC 5.45   5.21   10.60     RBC 3.36   3.43   3.61     Hemoglobin 10.9   11.2   12.4     Hematocrit 32.4   34.7   35.6     MCV 96.4   101.2   98.6      [facility] 32.4   32.7   34.3     [facility] 33.6   32.3   34.8     RDW 12.2   12.9   12.8     Platelets 253   230   128        Hypoglycemia noted on a.m. labs.  High-sensitivity troponin mildly elevated but flat.  Folate within normal limits.  Vitamin B12 within normal limits.    []  Microbiology:   []  Radiology:   []  EKG/Telemetry:    []  Cardiology/Vascular:    []  Pathology:  []  Old records:  []  Other:    Assessment & Plan   Assessment / Plan     Assessment:  Type 1 diabetes mellitus with hyperglycemia  Medical noncompliance  Hypertension  Hyperlipidemia  Anxiety/depression  Coronary artery disease s/p PCI    Plan:  -Patient with hypoglycemia this morning.  Hypoglycemia can be life-threatening and glucose level will need to be monitored closely.  -Decrease Levemir dosing and decrease SSI dosing.  Monitor blood glucose level and SSI requirement and make additional adjustments to regimen accordingly  -Suspect patient's A1c is elevated due to medical noncompliance  -Discussed with psychiatry service that patient is most likely noncompliant with insulin therapy as an outpatient which would explain her elevated hemoglobin A1c and her response to initiation of insulin therapy in the hospital  -Continue aspirin and Brilinta  -Continue metoprolol  -Clinical course will dictate further management     Personally reviewed patients labs and imaging, discussed with patient and nurse at bedside. Discussed management with the following psychiatry.  Thank you for allowing me to share in the care of this patient, please feel free to contact me with any additional questions or concerns.     Part of this note may be an electronic transcription/translation of spoken language to printed text using the Dragon dictation system.    DVT prophylaxis:  No DVT prophylaxis order currently exists.    CODE STATUS:   Code Status (Patient has no pulse and is not breathing): CPR (Attempt to Resuscitate)  Medical Interventions (Patient has pulse or is  breathing): Full Support  Release to patient: Routine Release        Electronically signed by Jewel Newberry MD, 05/05/23, 1:51 PM EDT.

## 2023-05-06 LAB
BILIRUB UR QL STRIP: NEGATIVE
CLARITY UR: CLEAR
COLOR UR: YELLOW
GLUCOSE BLDC GLUCOMTR-MCNC: 197 MG/DL (ref 70–99)
GLUCOSE BLDC GLUCOMTR-MCNC: 224 MG/DL (ref 70–99)
GLUCOSE BLDC GLUCOMTR-MCNC: 245 MG/DL (ref 70–99)
GLUCOSE BLDC GLUCOMTR-MCNC: 324 MG/DL (ref 70–99)
GLUCOSE BLDC GLUCOMTR-MCNC: 331 MG/DL (ref 70–99)
GLUCOSE UR STRIP-MCNC: ABNORMAL MG/DL
HGB UR QL STRIP.AUTO: NEGATIVE
KETONES UR QL STRIP: ABNORMAL
LEUKOCYTE ESTERASE UR QL STRIP.AUTO: NEGATIVE
NITRITE UR QL STRIP: NEGATIVE
PH UR STRIP.AUTO: 6.5 [PH] (ref 5–8)
PROT UR QL STRIP: NEGATIVE
SP GR UR STRIP: 1.03 (ref 1–1.03)
UROBILINOGEN UR QL STRIP: ABNORMAL

## 2023-05-06 PROCEDURE — 63710000001 INSULIN LISPRO (HUMAN) PER 5 UNITS: Performed by: INTERNAL MEDICINE

## 2023-05-06 PROCEDURE — 81003 URINALYSIS AUTO W/O SCOPE: CPT | Performed by: PSYCHIATRY & NEUROLOGY

## 2023-05-06 PROCEDURE — 63710000001 INSULIN DETEMIR PER 5 UNITS: Performed by: INTERNAL MEDICINE

## 2023-05-06 PROCEDURE — 82948 REAGENT STRIP/BLOOD GLUCOSE: CPT

## 2023-05-06 RX ORDER — LORAZEPAM 0.5 MG/1
1 TABLET ORAL 2 TIMES DAILY
Status: DISCONTINUED | OUTPATIENT
Start: 2023-05-06 | End: 2023-05-08

## 2023-05-06 RX ORDER — HYDROXYZINE PAMOATE 25 MG/1
25 CAPSULE ORAL EVERY 6 HOURS PRN
Status: DISCONTINUED | OUTPATIENT
Start: 2023-05-06 | End: 2023-05-11

## 2023-05-06 RX ORDER — LORAZEPAM 0.5 MG/1
1 TABLET ORAL ONCE
Status: COMPLETED | OUTPATIENT
Start: 2023-05-06 | End: 2023-05-06

## 2023-05-06 RX ADMIN — INSULIN LISPRO 3 UNITS: 100 INJECTION, SOLUTION INTRAVENOUS; SUBCUTANEOUS at 07:56

## 2023-05-06 RX ADMIN — Medication 1 MG: at 08:21

## 2023-05-06 RX ADMIN — Medication 100 MG: at 08:21

## 2023-05-06 RX ADMIN — NICOTINE 1 PATCH: 21 PATCH, EXTENDED RELEASE TRANSDERMAL at 14:00

## 2023-05-06 RX ADMIN — INSULIN LISPRO 5 UNITS: 100 INJECTION, SOLUTION INTRAVENOUS; SUBCUTANEOUS at 16:50

## 2023-05-06 RX ADMIN — ASPIRIN 81 MG: 81 TABLET, COATED ORAL at 08:20

## 2023-05-06 RX ADMIN — METOPROLOL TARTRATE 12.5 MG: 25 TABLET, FILM COATED ORAL at 20:39

## 2023-05-06 RX ADMIN — LORAZEPAM 1 MG: 0.5 TABLET ORAL at 20:39

## 2023-05-06 RX ADMIN — INSULIN LISPRO 3 UNITS: 100 INJECTION, SOLUTION INTRAVENOUS; SUBCUTANEOUS at 11:46

## 2023-05-06 RX ADMIN — TICAGRELOR 90 MG: 90 TABLET ORAL at 08:21

## 2023-05-06 RX ADMIN — HYDROXYZINE PAMOATE 25 MG: 25 CAPSULE ORAL at 10:00

## 2023-05-06 RX ADMIN — INSULIN DETEMIR 15 UNITS: 100 INJECTION, SOLUTION SUBCUTANEOUS at 20:39

## 2023-05-06 RX ADMIN — TICAGRELOR 90 MG: 90 TABLET ORAL at 20:39

## 2023-05-06 RX ADMIN — LORAZEPAM 1 MG: 0.5 TABLET ORAL at 11:46

## 2023-05-06 RX ADMIN — ARIPIPRAZOLE 5 MG: 5 TABLET ORAL at 08:21

## 2023-05-06 RX ADMIN — CETIRIZINE HYDROCHLORIDE 10 MG: 10 TABLET, FILM COATED ORAL at 20:39

## 2023-05-06 RX ADMIN — TRAZODONE HYDROCHLORIDE 100 MG: 50 TABLET ORAL at 20:39

## 2023-05-06 NOTE — PROGRESS NOTES
[facility]     Psychiatric Progress Note    Patient Name: Xena Briseno  : 1975  MRN: [id number]  Primary Care Physician:  Kim Damian APRN  Date of admission: 2023    Subjective   Subjective     Patient seen and chart reviewed, discussed with staff.    Chief Complaint: Long pauses in her responses becomes tearful      HPI:   Patient is a 47-year-old white female who has been referred from another hospital where she was treated for DKA.  Patient stopped taking her insulin and ended up in DKA.  Reportedly she had been on insulin pump which is removed and after that happens she always ends up in DKA.  When she was ready to be discharged from [facility] patient appeared very sad depressed and tearful.  And MIW was taken out and she was brought in to [facility].  Since then patient has been compliant with the medication and treatment.  She appears to have long pauses before her responses her thinking is still foggy she is evasive.  She does endorse anxiety as 9 and depression she is unable to rate.  Upon my evaluation patient stated she does have a depression of 7 out of 10 but it was very hard to get any information from her.  When I directly asked her that how it was to stop taking your insulin she stated I think it is stupid.  Sometimes she is able to say 1 sentence but most of the time she has difficulty in verbalizing what she wants to say and she endorses that.  Patient however is compliant with the medication and she is taking her insulin she is under care of hospitalist for diabetes management.  She has been urinating more therefore a urinalysis is going to be ordered to rule out UTI.  She stated sometimes it is things are embarrassing I have to wear pull-ups.  She still has long pauses before her responses and it seems like she has difficulty in expressing herself I was considering any possibility of some catatonic features in her presentation and may try small dose of  Ativan to see her response.  She stated that it was stupid to take herself off of insulin pump.      Objective   Objective     Vitals:   Temp:  [97.5 °F (36.4 °C)-98 °F (36.7 °C)] 98 °F (36.7 °C)  Heart Rate:  [76-81] 76  Resp:  [16-18] 16  BP: (105-109)/(69-72) 109/71        Mental Status Exam:      Appearance: Older than the stated age appears depressed  Reliability:   Partially reliable  Eye Contact:   Intermittent  Concentration/Focus:    Impaired  Behaviors:    Cooperative  Memory :    Impaired in all 3 spheres  Speech:   Disjointed minimal soft low  Language:   Impaired comprehension  Mood :   Anxious depressed  Affect:    Tearful  Thought process:   Linear concrete incoherent at times  Thought Content:    Denies suicidal homicidal ideation no delusions were elicited due to minimal conversation  Insight: Limited  Judgement:   Improved      Result Review    Result Review:  I have personally reviewed the results from the time of this admission to 5/6/2023 11:52 EDT and agree with these findings:  []  Laboratory  []  Microbiology  []  Radiology  []  EKG/Telemetry   []  Cardiology/Vascular   []  Pathology  []  Old records  []  Other:  Most notable findings include:     Lab Results (last 24 hours)     Procedure Component Value Units Date/Time    POC Glucose Once [[id number]]  (Abnormal) Collected: 05/06/23 1131    Specimen: Blood Updated: 05/06/23 1132     Glucose 245 mg/dL      Comment: Serial Number: [id number]:  [id number]       POC Glucose Once [[id number]]  (Abnormal) Collected: 05/06/23 0729    Specimen: Blood Updated: 05/06/23 0731     Glucose 224 mg/dL      Comment: Serial Number: [id number]:  [id number]       POC Glucose Once [[id number]]  (Abnormal) Collected: 05/06/23 0633    Specimen: Blood Updated: 05/06/23 0635     Glucose 197 mg/dL      Comment: Serial Number: [id number]:  [id number]       POC Glucose Once [[id number]]  (Abnormal) Collected: 05/05/23 2033    Specimen: Blood Updated:  05/05/23 2035     Glucose 270 mg/dL      Comment: Serial Number: [id number]:  [id number]       POC Glucose Once [[id number]]  (Abnormal) Collected: 05/05/23 1638    Specimen: Blood Updated: 05/05/23 1639     Glucose 332 mg/dL      Comment: Serial Number: [id number]:  [id number]                 Medications:   ARIPiprazole, 5 mg, Oral, Daily  aspirin, 81 mg, Oral, Daily  cetirizine, 10 mg, Oral, Nightly  folic acid, 1 mg, Oral, Daily  insulin detemir, 15 Units, Subcutaneous, Nightly  insulin lispro, 2-7 Units, Subcutaneous, TID With Meals  LORazepam, 1 mg, Oral, Once  metoprolol tartrate, 12.5 mg, Oral, Q12H  nicotine, 1 patch, Transdermal, Once  nicotine, 1 patch, Transdermal, Q24H  thiamine, 100 mg, Oral, Daily  ticagrelor, 90 mg, Oral, BID          Assessment / Plan       Active Hospital Problems:  Active Hospital Problems    Diagnosis    • **Major depression    • Major depressive disorder, recurrent episode, moderate    • Gastroesophageal reflux disease without esophagitis    • Type 1 diabetes mellitus    • Bipolar 2 disorder    • Hypertensive disorder    • Hyperlipidemia    • Seasonal allergies        Plan:     Continue current medications.  Continue diabetic management.  Ativan 1 mg now to see any response if she suffers from catatonic features then it can be given in a scheduled way.  Medication management to be done to improved patient's presentation.  Supportive psychotherapy is provided to the patient patient was also educated for possibility of cognitive impairment if she goes through DKA and her memory may become foggy for a while before she gets better.  Work on mood stabilization and abatement of any suicidal ideation or psychosis.  Work on appropriate safety plan  Continue supportive therapy  Patient to engage in all group and individual treatment modalities available on the unit  Obtain collateral information if possible  Titrate medications as clinically indicated  Work on appropriate  disposition follow-up including referrals to substance abuse treatment if indicated      Disposition:  I expect patient to be discharged 3 to 4 days    Part of this note may be an electronic transcription/translation of spoken language to printed text using the Dragon dictation system.         Electronically signed by Judy Gaston MD, 05/06/23, 11:52 AM EDT.

## 2023-05-06 NOTE — PLAN OF CARE
Goal Outcome Evaluation:  Plan of Care Reviewed With: patient  Patient Agreement with Plan of Care: agrees      Pt up to dayroom with peers, participated in group this evening. Pt rates anxiety and depression 9. Pt asking if she had ativan to take. Pt is tearful at times when trying to remember what she wants to ask, frustration over forgetting. Pt med compliant. Denies JES ESCUDERO, CARLINE.

## 2023-05-06 NOTE — PLAN OF CARE
Goal Outcome Evaluation:  Plan of Care Reviewed With: patient  Patient Agreement with Plan of Care: agrees     Progress: improving    Patient was awake and alert upon assessment, pt has low, mumbled speech, issues with getting words out, pt is tearful and depressed, pt Denies HI/SI and denies A/V/H. Pt states she is afraid she is going to forget things, pt reports having issues with cognition after DKA. Pt rates Anxiety and Depression 9/10. Pt has been in dayroom and rested bed in bed some today. Pt is medication compliant, see eMAR. Pt has meds for anxiety, pt received Ativan 1 mg at 1146 for possible catatonia, pt was speaking more clearly at 1400 and seems less confused and able to communicate better. Will continue to monitor and provide safe environment. New Ativan order in place, eMAR. Patient is participating in group and eating meals.

## 2023-05-06 NOTE — PROGRESS NOTES
Patient's chart personally reviewed.  Hyperglycemia noted so will increase Levemir dosing to 15 units nightly and continue SSI.  Patient did have hypoglycemia after receiving Levemir 20 units earlier in admission so we will monitor closely with 15 units.  No additional changes necessary at this time.  Please feel free to contact me with any additional questions or concerns and thank you for allow me to share in the care of this patient.    Electronically signed by Jewel Newberry MD, 05/06/23, 1:30 PM EDT.

## 2023-05-07 LAB
GLUCOSE BLDC GLUCOMTR-MCNC: 124 MG/DL (ref 70–99)
GLUCOSE BLDC GLUCOMTR-MCNC: 201 MG/DL (ref 70–99)
GLUCOSE BLDC GLUCOMTR-MCNC: 313 MG/DL (ref 70–99)
GLUCOSE BLDC GLUCOMTR-MCNC: 68 MG/DL (ref 70–99)

## 2023-05-07 PROCEDURE — 63710000001 INSULIN DETEMIR PER 5 UNITS: Performed by: INTERNAL MEDICINE

## 2023-05-07 PROCEDURE — 82948 REAGENT STRIP/BLOOD GLUCOSE: CPT

## 2023-05-07 PROCEDURE — 63710000001 INSULIN LISPRO (HUMAN) PER 5 UNITS: Performed by: INTERNAL MEDICINE

## 2023-05-07 RX ORDER — ARIPIPRAZOLE 15 MG/1
15 TABLET ORAL DAILY
Status: DISCONTINUED | OUTPATIENT
Start: 2023-05-07 | End: 2023-05-13 | Stop reason: HOSPADM

## 2023-05-07 RX ADMIN — LORAZEPAM 1 MG: 0.5 TABLET ORAL at 08:39

## 2023-05-07 RX ADMIN — INSULIN DETEMIR 12 UNITS: 100 INJECTION, SOLUTION SUBCUTANEOUS at 11:28

## 2023-05-07 RX ADMIN — TICAGRELOR 90 MG: 90 TABLET ORAL at 08:38

## 2023-05-07 RX ADMIN — LORAZEPAM 1 MG: 0.5 TABLET ORAL at 20:52

## 2023-05-07 RX ADMIN — ACETAMINOPHEN 650 MG: 325 TABLET ORAL at 13:03

## 2023-05-07 RX ADMIN — TICAGRELOR 90 MG: 90 TABLET ORAL at 20:52

## 2023-05-07 RX ADMIN — ARIPIPRAZOLE 5 MG: 5 TABLET ORAL at 08:38

## 2023-05-07 RX ADMIN — INSULIN LISPRO 5 UNITS: 100 INJECTION, SOLUTION INTRAVENOUS; SUBCUTANEOUS at 11:28

## 2023-05-07 RX ADMIN — METOPROLOL TARTRATE 12.5 MG: 25 TABLET, FILM COATED ORAL at 08:39

## 2023-05-07 RX ADMIN — HYDROXYZINE PAMOATE 25 MG: 25 CAPSULE ORAL at 17:58

## 2023-05-07 RX ADMIN — INSULIN LISPRO 3 UNITS: 100 INJECTION, SOLUTION INTRAVENOUS; SUBCUTANEOUS at 16:32

## 2023-05-07 RX ADMIN — Medication 1 MG: at 08:39

## 2023-05-07 RX ADMIN — Medication 100 MG: at 08:39

## 2023-05-07 RX ADMIN — ASPIRIN 81 MG: 81 TABLET, COATED ORAL at 08:39

## 2023-05-07 RX ADMIN — METOPROLOL TARTRATE 12.5 MG: 25 TABLET, FILM COATED ORAL at 20:52

## 2023-05-07 RX ADMIN — NICOTINE 1 PATCH: 21 PATCH, EXTENDED RELEASE TRANSDERMAL at 13:03

## 2023-05-07 RX ADMIN — CETIRIZINE HYDROCHLORIDE 10 MG: 10 TABLET, FILM COATED ORAL at 20:52

## 2023-05-07 NOTE — PROGRESS NOTES
[facility]     Psychiatric Progress Note    Patient Name: Xena Briseno  : 1975  MRN: [id number]  Primary Care Physician:  Kim Damian APRN  Date of admission: 2023    Subjective   Subjective     Patient seen and chart reviewed, discussed with staff.    Chief Complaint: I am doing better      HPI:   Staff reported that patient has been more communicative and she is less sad is hesitant to verbalize her responses.  She is more fluent in her speech since she was given Ativan.  Patient is seen and evaluated by me she was in the hallway when I entered the unit.  She was smiling and greeted.  Patient stated that she feels better and she is able to talk much more than before.  Her  is visiting this afternoon she is little anxious about it.  She then discussed with me in detail about her concerns for having a proper diabetic care once she gets out of the hospital and she also wants to learn certain things that can happen after DKA.  She is wants to work in in an organized manner and not to get confused which she is currently afraid of that at times she has been in the gas station but she does not know how much how much money and she has to pay.  There are multiple concerns which are related to her DKA and her noncompliance with insulin and that has led to all these worries.  I discussed with her that she needs to let her Dr. Meyers and Angelica know about her follow-up care with her primary care physician as well as if she needs to see an endocrinologist regarding her insulin pump and to regulate it and to make a low.  It seems that she is not able to take good care of her insulin pump and that is what she wants to have a little bit more training in to administer it regularly.  She had all these more somatic concerns and why they are happening.  She just recovered from DKA and that her that might have led to some cognitive lapses which will take time once her blood sugars are content  well-controlled.  This morning insulin management was done because she has couple of episodes of hypoglycemia.      Objective   Objective     Vitals:   Temp:  [97.7 °F (36.5 °C)-98.2 °F (36.8 °C)] 97.7 °F (36.5 °C)  Heart Rate:  [77-78] 77  Resp:  [16-18] 16  BP: (106-115)/(71-72) 106/71          Mental Status Exam:      Appearance: Improved ADLs less anxious  Reliability: Fair  Eye Contact: Good  Concentration/Focus:   Improved  Behaviors:   Cooperative  Memory :   Improved in all spheres  Speech:   Soft and coherent  Language: Normal  Mood :   Fine  Affect:   Restricted  Thought process:   Linear goal-directed  Thought Content:   Denies paranoia auditory or visual hallucination denies being suicidal or homicidal  Insight:   Improved  Judgement:   Improved      Result Review    Result Review:  I have personally reviewed the results from the time of this admission to 5/7/2023 11:41 EDT and agree with these findings:  []  Laboratory  []  Microbiology  []  Radiology  []  EKG/Telemetry   []  Cardiology/Vascular   []  Pathology  []  Old records  []  Other:  Most notable findings include:     Lab Results (last 24 hours)     Procedure Component Value Units Date/Time    POC Glucose Once [[id number]]  (Abnormal) Collected: 05/07/23 1048    Specimen: Blood Updated: 05/07/23 1050     Glucose 313 mg/dL      Comment: Serial Number: [id number]:  [id number]       POC Glucose Once [[id number]]  (Abnormal) Collected: 05/07/23 0731    Specimen: Blood Updated: 05/07/23 0732     Glucose 68 mg/dL      Comment: Serial Number: [id number]:  [id number]       POC Glucose Once [[id number]]  (Abnormal) Collected: 05/06/23 2027    Specimen: Blood Updated: 05/06/23 2030     Glucose 331 mg/dL      Comment: Serial Number: [id number]:  [id number]       Urinalysis With Culture If Indicated - Urine, Clean Catch [[id number]]  (Abnormal) Collected: 05/06/23 1718    Specimen: Urine, Clean Catch Updated: 05/06/23 1742     Color, UA Yellow      Appearance, UA Clear     pH, UA 6.5     Specific Gravity, UA 1.026     Glucose, UA >=1000 mg/dL (3+)     Ketones, UA 15 mg/dL (1+)     Bilirubin, UA Negative     Blood, UA Negative     Protein, UA Negative     Leuk Esterase, UA Negative     Nitrite, UA Negative     Urobilinogen, UA 0.2 E.U./dL    Narrative:      In absence of clinical symptoms, the presence of pyuria, bacteria, and/or nitrites on the urinalysis result does not correlate with infection.  Urine microscopic not indicated.    POC Glucose Once [[id number]]  (Abnormal) Collected: 05/06/23 1637    Specimen: Blood Updated: 05/06/23 1639     Glucose 324 mg/dL      Comment: Serial Number: [id number]:  [id number]                 Medications:   ARIPiprazole, 5 mg, Oral, Daily  aspirin, 81 mg, Oral, Daily  cetirizine, 10 mg, Oral, Nightly  folic acid, 1 mg, Oral, Daily  insulin detemir, 12 Units, Subcutaneous, Daily  insulin lispro, 2-7 Units, Subcutaneous, TID With Meals  LORazepam, 1 mg, Oral, BID  metoprolol tartrate, 12.5 mg, Oral, Q12H  nicotine, 1 patch, Transdermal, Q24H  thiamine, 100 mg, Oral, Daily  ticagrelor, 90 mg, Oral, BID          Assessment / Plan       Active Hospital Problems:  Active Hospital Problems    Diagnosis    • **Major depression    • Major depressive disorder, recurrent episode, moderate    • Gastroesophageal reflux disease without esophagitis    • Type 1 diabetes mellitus    • Bipolar 2 disorder    • Hypertensive disorder    • Hyperlipidemia    • Seasonal allergies        Plan:     Continue current medications.  May increase the dose of Abilify to 10 mg daily.  Continue lorazepam 1 mg twice daily to alleviate some catatonic symptoms patient was suffering from.  Monitor symptom resolution.  Follow-up with her primary care physician/endocrinologist as needed once patient is discharged she has problem managing her insulin pump and wants to have more training regarding that.  Work on mood stabilization and abatement of any  suicidal ideation or psychosis.  Work on appropriate safety plan  Continue supportive therapy  Patient to engage in all group and individual treatment modalities available on the unit  Obtain collateral information if possible  Titrate medications as clinically indicated  Work on appropriate disposition follow-up including referrals to substance abuse treatment if indicated      Disposition:  I expect patient to be discharged 2-3    Part of this note may be an electronic transcription/translation of spoken language to printed text using the Dragon dictation system.         Electronically signed by Judy Gaston MD, 05/07/23, 11:41 AM EDT.

## 2023-05-07 NOTE — PLAN OF CARE
Goal Outcome Evaluation:  Plan of Care Reviewed With: patient  Patient Agreement with Plan of Care: agrees     Progress: improving   Patient calm and cooperative. Denies si/hi/avh. Rates anxiety 7, depression 8. Participated in group sessions. Compliant with medications. Will continue to monitor and provide safe environment.

## 2023-05-07 NOTE — PLAN OF CARE
Goal Outcome Evaluation:  Plan of Care Reviewed With: patient  Patient Agreement with Plan of Care: agrees   Pt  displays less fidgeting this shift, pt stated that she has had less anxiety related to dose of ativan today. Pt educated on medication scheduled this shift. Pt rated anxiety and depression 7. Denied SI, HI, and AVH. Pt has been up to dayroom with peers and interacting appropriately.

## 2023-05-07 NOTE — PROGRESS NOTES
Patient's chart personally reviewed.  Patient had a recurrent episode of hypoglycemia this morning.  Will decrease Levemir dosing to 12 units and will also change timing of dose to daily.  Patient had had hyperglycemia throughout the day yesterday and only hypoglycemic episode was AM of 05/07/2023.  Continue SSI at current dosing.  Monitor blood glucose level and will make additional adjustments accordingly.  Thank you for allowing me to share in the care of this patient, please free to contact me with any additional questions or concerns    Electronically signed by Jewel Newberry MD, 05/07/23, 9:55 AM EDT.

## 2023-05-08 ENCOUNTER — APPOINTMENT (OUTPATIENT)
Dept: CARDIOLOGY | Facility: HOSPITAL | Age: 48
DRG: 885 | End: 2023-05-08
Payer: MEDICARE

## 2023-05-08 LAB
GLUCOSE BLDC GLUCOMTR-MCNC: 105 MG/DL (ref 70–99)
GLUCOSE BLDC GLUCOMTR-MCNC: 184 MG/DL (ref 70–99)
GLUCOSE BLDC GLUCOMTR-MCNC: 273 MG/DL (ref 70–99)
GLUCOSE BLDC GLUCOMTR-MCNC: 294 MG/DL (ref 70–99)

## 2023-05-08 PROCEDURE — 63710000001 INSULIN LISPRO (HUMAN) PER 5 UNITS: Performed by: INTERNAL MEDICINE

## 2023-05-08 PROCEDURE — 99233 SBSQ HOSP IP/OBS HIGH 50: CPT | Performed by: INTERNAL MEDICINE

## 2023-05-08 PROCEDURE — 93306 TTE W/DOPPLER COMPLETE: CPT | Performed by: INTERNAL MEDICINE

## 2023-05-08 PROCEDURE — 63710000001 INSULIN DETEMIR PER 5 UNITS: Performed by: INTERNAL MEDICINE

## 2023-05-08 PROCEDURE — 82948 REAGENT STRIP/BLOOD GLUCOSE: CPT

## 2023-05-08 PROCEDURE — 93306 TTE W/DOPPLER COMPLETE: CPT

## 2023-05-08 RX ORDER — LORAZEPAM 0.5 MG/1
0.5 TABLET ORAL 2 TIMES DAILY
Status: DISCONTINUED | OUTPATIENT
Start: 2023-05-08 | End: 2023-05-10

## 2023-05-08 RX ORDER — MIRTAZAPINE 15 MG/1
15 TABLET, FILM COATED ORAL NIGHTLY
Status: DISCONTINUED | OUTPATIENT
Start: 2023-05-08 | End: 2023-05-10

## 2023-05-08 RX ORDER — INSULIN LISPRO 100 [IU]/ML
2 INJECTION, SOLUTION INTRAVENOUS; SUBCUTANEOUS
Status: DISCONTINUED | OUTPATIENT
Start: 2023-05-08 | End: 2023-05-10

## 2023-05-08 RX ORDER — FUROSEMIDE 20 MG/1
20 TABLET ORAL ONCE
Status: COMPLETED | OUTPATIENT
Start: 2023-05-08 | End: 2023-05-08

## 2023-05-08 RX ADMIN — TICAGRELOR 90 MG: 90 TABLET ORAL at 08:26

## 2023-05-08 RX ADMIN — INSULIN DETEMIR 12 UNITS: 100 INJECTION, SOLUTION SUBCUTANEOUS at 10:31

## 2023-05-08 RX ADMIN — FUROSEMIDE 20 MG: 20 TABLET ORAL at 14:36

## 2023-05-08 RX ADMIN — METOPROLOL TARTRATE 12.5 MG: 25 TABLET, FILM COATED ORAL at 21:52

## 2023-05-08 RX ADMIN — ARIPIPRAZOLE 15 MG: 15 TABLET ORAL at 08:26

## 2023-05-08 RX ADMIN — LORAZEPAM 1 MG: 0.5 TABLET ORAL at 08:26

## 2023-05-08 RX ADMIN — MIRTAZAPINE 15 MG: 15 TABLET, FILM COATED ORAL at 21:12

## 2023-05-08 RX ADMIN — ASPIRIN 81 MG: 81 TABLET, COATED ORAL at 08:26

## 2023-05-08 RX ADMIN — NICOTINE 1 PATCH: 21 PATCH, EXTENDED RELEASE TRANSDERMAL at 14:39

## 2023-05-08 RX ADMIN — TICAGRELOR 90 MG: 90 TABLET ORAL at 21:12

## 2023-05-08 RX ADMIN — HYDROXYZINE PAMOATE 25 MG: 25 CAPSULE ORAL at 18:10

## 2023-05-08 RX ADMIN — METOPROLOL TARTRATE 12.5 MG: 25 TABLET, FILM COATED ORAL at 08:26

## 2023-05-08 RX ADMIN — INSULIN LISPRO 2 UNITS: 100 INJECTION, SOLUTION INTRAVENOUS; SUBCUTANEOUS at 16:45

## 2023-05-08 RX ADMIN — Medication 100 MG: at 08:26

## 2023-05-08 RX ADMIN — LORAZEPAM 0.5 MG: 0.5 TABLET ORAL at 21:12

## 2023-05-08 RX ADMIN — INSULIN LISPRO 4 UNITS: 100 INJECTION, SOLUTION INTRAVENOUS; SUBCUTANEOUS at 11:37

## 2023-05-08 RX ADMIN — CETIRIZINE HYDROCHLORIDE 10 MG: 10 TABLET, FILM COATED ORAL at 21:12

## 2023-05-08 RX ADMIN — INSULIN LISPRO 4 UNITS: 100 INJECTION, SOLUTION INTRAVENOUS; SUBCUTANEOUS at 16:44

## 2023-05-08 RX ADMIN — Medication 1 MG: at 08:26

## 2023-05-08 NOTE — PLAN OF CARE
"Goal Outcome Evaluation:         Pt is very anxious and restless this evening. She reports \"I feel like I could explode one minute, and then the next minute I feel like I could just cover my head up and lay down.\" Pt reports that another pt has been smarting off and her and causing her to become agitated. She states that she is very worried about going into DKA again once she gets home. She said she plans to use her insulin pump when she gets home to help her glucose stay in a healthy range, but she is scared her sugar will drop low or high enough to make her be \"out of it \" enough to take her insulin pump out like she did this last time, and cause her to die. She does not wish to die, and states it made her very happy to get to visit with her  today, and to get to talk to her daughter over the phone. She denies SI/HI or hallucinations. Rates anxiety 8, depression 9. She states she would like to discuss getting on new depression medications with the doctor, but she would very much like to go home as soon as possible to be with her family. She is tearful at times, but very pleasant with staff and expressive of emotions. She is able to make her needs known and is compliant with medications and treatment. She is currently resting in bed with eyes closed, will continue to monitor and provide safe environment.          "

## 2023-05-08 NOTE — PLAN OF CARE
Patient's chart was reviewed by me personally.  She has had no more episodes of hypoglycemia.  She seems to be doing well with her glucoses on Levemir.  We will continue to follow closely.  Continue current regimen.  If you have any questions please contact me.    Electronically signed by Heriberto Gomez MD, 05/08/23, 9:36 AM EDT.

## 2023-05-08 NOTE — PROGRESS NOTES
[facility]   Hospitalist Progress Note  Date: 2023  Patient Name: Xena Briseno  : 1975  MRN: [id number]  Date of admission: 2023    Subjective   Subjective     Reason for consult: Management of type 1 diabetes mellitus    Interval Followup:   -- Coronary disease with drug-eluting stent placed in January  -- Lower extremity swelling of 1+ edema up to the knee is relatively new onset  -- Echocardiogram from January shows an EF of 64% with no diastolic dysfunction and normal RVSP  -- Patient had no more episodes of hypoglycemia so we will increase her Levemir from 12 units to 14 units and add 2 units of mealtime insulin plus sliding scale  -- Repeat echocardiogram due to lower extremity swelling    Review of Systems   All systems reviewed and negative unless stated otherwise under subjective.    Objective   Objective     Vitals:   Temp:  [98 °F (36.7 °C)-98.3 °F (36.8 °C)] 98.3 °F (36.8 °C)  Heart Rate:  [76-81] 81  Resp:  [18-20] 18  BP: (105-110)/(61-71) 110/61  Physical Exam   Gen: No acute distress, sitting up on edge of bed  Resp: CTAB, No w/r/r, No respiratory distress appreciated  Card: RRR, No m/r/g  Abd: Soft, Nontender, Nondistended, + bowel sounds    Result Review    Result Review:  I have personally reviewed the results as below and agree with these findings:  []  Laboratory:   CMP        2023    05:15 2023    03:38 2023    19:27   CMP   Glucose 311   356   380     BUN 9   7   10     Creatinine 0.56   0.48   0.69     EGFR 112.7   117.0   107.2     Sodium 139   138   136     Potassium 3.5   3.4   4.6     Chloride 106   104   99     Calcium 7.8   7.9   8.9     Total Protein 4.7    5.8     Albumin 3.0    3.3     Globulin 1.7    2.5     Total Bilirubin <0.2    0.3     Alkaline Phosphatase 93    104     AST (SGOT) 23    39     ALT (SGPT) 27    33     Albumin/Globulin Ratio 1.8    1.3     BUN/Creatinine Ratio 16.1   14.6   14.5     Anion Gap 9.0   6.0   13.4       CBC         1/16/2023    05:15 1/17/2023    03:38 5/4/2023    19:27   CBC   WBC 5.45   5.21   10.60     RBC 3.36   3.43   3.61     Hemoglobin 10.9   11.2   12.4     Hematocrit 32.4   34.7   35.6     MCV 96.4   101.2   98.6     MCH 32.4   32.7   34.3     MCHC 33.6   32.3   34.8     RDW 12.2   12.9   12.8     Platelets 253   230   128        Hypoglycemia noted on a.m. labs.  High-sensitivity troponin mildly elevated but flat.  Folate within normal limits.  Vitamin B12 within normal limits.    []  Microbiology:   []  Radiology:   []  EKG/Telemetry:    []  Cardiology/Vascular:    []  Pathology:  []  Old records:  []  Other:    Assessment & Plan   Assessment / Plan     Assessment:  Type 1 diabetes mellitus with hyperglycemia  Medical noncompliance  New onset lower extremity swelling with 1+ pitting edema  Hypertension  Hyperlipidemia  Anxiety/depression  Coronary artery disease with drug-eluting stent in January 2023    Plan:  -Increased Levemir from 12 units to 14 units and added mealtime insulin with 2 units of Humalog with each meal  -Continue Brilinta  -Restart aspirin as she needs to be on both these medications due to the fact that she recently had a drug-eluting stent  -Echocardiogram to check and make sure she does not have new onset heart failure  -One-time dose of 20 mg of oral Lasix until we look at the echocardiogram  -Continue metoprolol  -Clinical course will dictate further management     Personally reviewed patients labs and imaging, discussed with patient and nurse at bedside. Discussed management with the following psychiatry.  Thank you for allowing me to share in the care of this patient, please feel free to contact me with any additional questions or concerns.     Part of this note may be an electronic transcription/translation of spoken language to printed text using the Dragon dictation system.    CODE STATUS:   Code Status (Patient has no pulse and is not breathing): CPR (Attempt to Resuscitate)  Medical  Interventions (Patient has pulse or is breathing): Full Support  Release to patient: Routine Release        Electronically signed by Heriberto Gomez MD, 05/08/23, 1:42 PM EDT.

## 2023-05-08 NOTE — CASE MANAGEMENT/SOCIAL WORK
Call to Starr Aviles [phone] made per pt request. Not able to make contact, staff LVM with direct contact number.

## 2023-05-08 NOTE — PLAN OF CARE
Goal Outcome Evaluation:  Plan of Care Reviewed With: patient  Patient Agreement with Plan of Care: agrees         Pt has been calm and cooperative.  She denies SI/HI, AVH, and contracts for safety.  She rates anxiety and depression 8. She has +1 pitting edema in lower legs bilat and is being followed by Dr. Gomez. She is med compliant and able to make her needs known. Will continue to monitor.

## 2023-05-08 NOTE — PROGRESS NOTES
" [facility]     Psychiatric Progress Note    Patient Name: Xena Briseno  : 1975  MRN: [id number]  Primary Care Physician:  Kim Damian APRN  Date of admission: 2023    Subjective   Subjective     Patient seen and chart reviewed, discussed with staff.    Chief Complaint: Depression, confusion      HPI:     Staff reports the patient slept well.  Confusion appears to be better.  She has been asking appropriate questions and has not improved insight into her illness.  She got very anxious and upset last night and required a dose of hydroxyzine, and it was effective.    Patient days, cooperative.  She reports that her anxiety is better.  She reports she has been sleeping better.  She has been an increase of her aripiprazole for her depression, but had her first dose of 15 mg today.    She is on scheduled lorazepam and we will continue to taper and decrease the dose today.    We will add mirtazapine to help with depression and anxiety    She is complaining of some edema in her lower extremities and has 1+ pitting edema.  Blood sugars are also not very well controlled.  Medicine team is following.  Patient also has bruising on both arms which are worse.  We will stop aspirin.      Objective   Objective     Vitals:   Temp:  [98 °F (36.7 °C)-98.3 °F (36.8 °C)] 98.3 °F (36.8 °C)  Heart Rate:  [76-81] 81  Resp:  [18-20] 18  BP: (105-110)/(61-71) 110/61    Increased bruising both arms      Mental Status Exam:      Appearance:   Well-developed, well-nourished, calm, cooperative  Reliability:   Good  Eye Contact:   Good  Concentration/Focus:    Attentive to the interview  Behaviors:    No restlessness or agitation  Memory :    Sensorium intact  Speech:    Decreased tone, normal rate  Language:   Appropriate, relevant  Mood :    \"Better\"  Affect:    Improved, but still constricted  Thought process:    Goal directed, able to converse appropriately and no obvious forgetfulness or confusion today  Thought " Content:    Denies suicidal or homicidal ideation  Insight:   Improved, good  Judgement:    Intact no behavioral disturbance      Result Review    Result Review:  I have personally reviewed the results from the time of this admission to 5/8/2023 12:48 EDT and agree with these findings:  []  Laboratory  []  Microbiology  []  Radiology  []  EKG/Telemetry   []  Cardiology/Vascular   []  Pathology  []  Old records  []  Other:  Most notable findings include:     Lab Results (last 24 hours)     Procedure Component Value Units Date/Time    POC Glucose Once [[id number]]  (Abnormal) Collected: 05/08/23 1121    Specimen: Blood Updated: 05/08/23 1125     Glucose 273 mg/dL      Comment: Serial Number: [id number]:  [id number]       POC Glucose Once [[id number]]  (Abnormal) Collected: 05/08/23 0724    Specimen: Blood Updated: 05/08/23 0725     Glucose 105 mg/dL      Comment: Serial Number: [id number]:  [id number]       POC Glucose Once [[id number]]  (Abnormal) Collected: 05/07/23 2030    Specimen: Blood Updated: 05/07/23 2032     Glucose 124 mg/dL      Comment: Serial Number: [id number]:  [id number]       POC Glucose Once [[id number]]  (Abnormal) Collected: 05/07/23 1629    Specimen: Blood Updated: 05/07/23 1631     Glucose 201 mg/dL      Comment: Serial Number: [id number]:  [id number]                 Medications:   ARIPiprazole, 15 mg, Oral, Daily  cetirizine, 10 mg, Oral, Nightly  folic acid, 1 mg, Oral, Daily  insulin detemir, 12 Units, Subcutaneous, Daily  insulin lispro, 2-7 Units, Subcutaneous, TID With Meals  LORazepam, 1 mg, Oral, BID  metoprolol tartrate, 12.5 mg, Oral, Q12H  nicotine, 1 patch, Transdermal, Q24H  thiamine, 100 mg, Oral, Daily  ticagrelor, 90 mg, Oral, BID          Assessment / Plan       Active Hospital Problems:  Active Hospital Problems    Diagnosis    • **Major depression    • Major depressive disorder, recurrent episode, moderate    • Gastroesophageal reflux disease without  esophagitis    • Type 1 diabetes mellitus    • Bipolar 2 disorder    • Hypertensive disorder    • Hyperlipidemia    • Seasonal allergies        Plan:     • Has talked with Dr. Gomez to address edema.  He has been following will also monitor her sugars.  Her bruising is in creased as well.  Had stopped her aspirin  • Add mirtazapine bedtime for depression and anxiety  • Patient is here on an MIW, and hope to get her signed in tomorrow and anticipate not needing to go to court.  • Work on mood stabilization and abatement of any suicidal ideation or psychosis.  Work on appropriate safety plan  • Continue supportive therapy  • Patient to engage in all group and individual treatment modalities available on the unit  • Obtain collateral information if possible  • Titrate medications as clinically indicated  • Work on appropriate disposition follow-up including referrals to substance abuse treatment if indicated      Disposition:  I expect patient to be discharged 2 to 3 days.    Part of this note may be an electronic transcription/translation of spoken language to printed text using the Dragon dictation system.         Electronically signed by Jeremy Meyers MD, 05/08/23, 12:48 PM EDT.

## 2023-05-09 LAB
ASCENDING AORTA: 3 CM
BH CV ECHO MEAS - AO ROOT DIAM: 2.8 CM
BH CV ECHO MEAS - EDV(CUBED): 114.3 ML
BH CV ECHO MEAS - EDV(MOD-SP2): 79 ML
BH CV ECHO MEAS - EDV(MOD-SP4): 68 ML
BH CV ECHO MEAS - EF(MOD-BP): 59.1 %
BH CV ECHO MEAS - EF(MOD-SP2): 61.1 %
BH CV ECHO MEAS - EF(MOD-SP4): 57.2 %
BH CV ECHO MEAS - ESV(CUBED): 21.2 ML
BH CV ECHO MEAS - ESV(MOD-SP2): 30.7 ML
BH CV ECHO MEAS - ESV(MOD-SP4): 29.1 ML
BH CV ECHO MEAS - FS: 43 %
BH CV ECHO MEAS - IVS/LVPW: 0.98 CM
BH CV ECHO MEAS - IVSD: 0.83 CM
BH CV ECHO MEAS - LA DIMENSION: 3.4 CM
BH CV ECHO MEAS - LAT PEAK E' VEL: 13.8 CM/SEC
BH CV ECHO MEAS - LV DIASTOLIC VOL/BSA (35-75): 41.3 CM2
BH CV ECHO MEAS - LV MASS(C)D: 136.8 GRAMS
BH CV ECHO MEAS - LV SYSTOLIC VOL/BSA (12-30): 17.7 CM2
BH CV ECHO MEAS - LVIDD: 4.9 CM
BH CV ECHO MEAS - LVIDS: 2.8 CM
BH CV ECHO MEAS - LVOT AREA: 3 CM2
BH CV ECHO MEAS - LVOT DIAM: 1.96 CM
BH CV ECHO MEAS - LVPWD: 0.85 CM
BH CV ECHO MEAS - MED PEAK E' VEL: 8.8 CM/SEC
BH CV ECHO MEAS - MV A MAX VEL: 82.7 CM/SEC
BH CV ECHO MEAS - MV DEC SLOPE: 575.5 CM/SEC2
BH CV ECHO MEAS - MV DEC TIME: 0.17 MSEC
BH CV ECHO MEAS - MV E MAX VEL: 94.1 CM/SEC
BH CV ECHO MEAS - MV E/A: 1.14
BH CV ECHO MEAS - RVDD: 2.46 CM
BH CV ECHO MEAS - SI(MOD-SP2): 29.3 ML/M2
BH CV ECHO MEAS - SI(MOD-SP4): 23.6 ML/M2
BH CV ECHO MEAS - SV(MOD-SP2): 48.3 ML
BH CV ECHO MEAS - SV(MOD-SP4): 38.9 ML
BH CV ECHO MEASUREMENTS AVERAGE E/E' RATIO: 8.33
GLUCOSE BLDC GLUCOMTR-MCNC: 104 MG/DL (ref 70–99)
GLUCOSE BLDC GLUCOMTR-MCNC: 266 MG/DL (ref 70–99)
GLUCOSE BLDC GLUCOMTR-MCNC: 284 MG/DL (ref 70–99)
GLUCOSE BLDC GLUCOMTR-MCNC: 288 MG/DL (ref 70–99)
IVRT: 82 MSEC
LEFT ATRIUM VOLUME INDEX: 23.4 ML/M2
MAXIMAL PREDICTED HEART RATE: 172 BPM
STRESS TARGET HR: 146 BPM

## 2023-05-09 PROCEDURE — 63710000001 INSULIN LISPRO (HUMAN) PER 5 UNITS: Performed by: INTERNAL MEDICINE

## 2023-05-09 PROCEDURE — 99232 SBSQ HOSP IP/OBS MODERATE 35: CPT | Performed by: INTERNAL MEDICINE

## 2023-05-09 PROCEDURE — 82948 REAGENT STRIP/BLOOD GLUCOSE: CPT

## 2023-05-09 PROCEDURE — 63710000001 INSULIN LISPRO (HUMAN) PER 5 UNITS: Performed by: PHYSICIAN ASSISTANT

## 2023-05-09 PROCEDURE — 63710000001 INSULIN DETEMIR PER 5 UNITS: Performed by: INTERNAL MEDICINE

## 2023-05-09 RX ORDER — NICOTINE 21 MG/24HR
1 PATCH, TRANSDERMAL 24 HOURS TRANSDERMAL
Status: DISCONTINUED | OUTPATIENT
Start: 2023-05-09 | End: 2023-05-13 | Stop reason: HOSPADM

## 2023-05-09 RX ORDER — POLYETHYLENE GLYCOL 3350 17 G
2 POWDER IN PACKET (EA) ORAL
Status: DISCONTINUED | OUTPATIENT
Start: 2023-05-09 | End: 2023-05-09

## 2023-05-09 RX ORDER — FUROSEMIDE 40 MG/1
40 TABLET ORAL
Status: DISCONTINUED | OUTPATIENT
Start: 2023-05-09 | End: 2023-05-10

## 2023-05-09 RX ORDER — INSULIN LISPRO 100 [IU]/ML
2-7 INJECTION, SOLUTION INTRAVENOUS; SUBCUTANEOUS
Status: DISCONTINUED | OUTPATIENT
Start: 2023-05-09 | End: 2023-05-10

## 2023-05-09 RX ADMIN — INSULIN LISPRO 4 UNITS: 100 INJECTION, SOLUTION INTRAVENOUS; SUBCUTANEOUS at 11:36

## 2023-05-09 RX ADMIN — ASPIRIN 81 MG: 81 TABLET, COATED ORAL at 08:44

## 2023-05-09 RX ADMIN — Medication 100 MG: at 08:44

## 2023-05-09 RX ADMIN — LORAZEPAM 0.5 MG: 0.5 TABLET ORAL at 21:14

## 2023-05-09 RX ADMIN — CETIRIZINE HYDROCHLORIDE 10 MG: 10 TABLET, FILM COATED ORAL at 21:14

## 2023-05-09 RX ADMIN — LORAZEPAM 0.5 MG: 0.5 TABLET ORAL at 08:44

## 2023-05-09 RX ADMIN — ACETAMINOPHEN 650 MG: 325 TABLET ORAL at 21:14

## 2023-05-09 RX ADMIN — METOPROLOL TARTRATE 12.5 MG: 25 TABLET, FILM COATED ORAL at 08:44

## 2023-05-09 RX ADMIN — INSULIN DETEMIR 14 UNITS: 100 INJECTION, SOLUTION SUBCUTANEOUS at 08:43

## 2023-05-09 RX ADMIN — INSULIN LISPRO 4 UNITS: 100 INJECTION, SOLUTION INTRAVENOUS; SUBCUTANEOUS at 21:57

## 2023-05-09 RX ADMIN — NICOTINE 1 PATCH: 21 PATCH, EXTENDED RELEASE TRANSDERMAL at 14:35

## 2023-05-09 RX ADMIN — MAGNESIUM HYDROXIDE 10 ML: 2400 SUSPENSION ORAL at 20:17

## 2023-05-09 RX ADMIN — ARIPIPRAZOLE 15 MG: 15 TABLET ORAL at 08:44

## 2023-05-09 RX ADMIN — INSULIN LISPRO 4 UNITS: 100 INJECTION, SOLUTION INTRAVENOUS; SUBCUTANEOUS at 16:15

## 2023-05-09 RX ADMIN — TICAGRELOR 90 MG: 90 TABLET ORAL at 08:44

## 2023-05-09 RX ADMIN — FUROSEMIDE 40 MG: 40 TABLET ORAL at 18:29

## 2023-05-09 RX ADMIN — MIRTAZAPINE 15 MG: 15 TABLET, FILM COATED ORAL at 21:14

## 2023-05-09 RX ADMIN — INSULIN LISPRO 2 UNITS: 100 INJECTION, SOLUTION INTRAVENOUS; SUBCUTANEOUS at 11:34

## 2023-05-09 RX ADMIN — TICAGRELOR 90 MG: 90 TABLET ORAL at 21:14

## 2023-05-09 RX ADMIN — METOPROLOL TARTRATE 12.5 MG: 25 TABLET, FILM COATED ORAL at 21:14

## 2023-05-09 RX ADMIN — FUROSEMIDE 40 MG: 40 TABLET ORAL at 11:28

## 2023-05-09 RX ADMIN — SERTRALINE HYDROCHLORIDE 50 MG: 50 TABLET ORAL at 11:34

## 2023-05-09 RX ADMIN — INSULIN LISPRO 2 UNITS: 100 INJECTION, SOLUTION INTRAVENOUS; SUBCUTANEOUS at 08:43

## 2023-05-09 RX ADMIN — Medication 1 MG: at 08:44

## 2023-05-09 RX ADMIN — INSULIN LISPRO 2 UNITS: 100 INJECTION, SOLUTION INTRAVENOUS; SUBCUTANEOUS at 16:14

## 2023-05-09 RX ADMIN — HYDROXYZINE PAMOATE 25 MG: 25 CAPSULE ORAL at 16:04

## 2023-05-09 RX ADMIN — NICOTINE POLACRILEX 2 MG: 2 LOZENGE ORAL at 12:23

## 2023-05-09 NOTE — PROGRESS NOTES
[facility]     Psychiatric Progress Note    Patient Name: Xena Briseno  : 1975  MRN: [id number]  Primary Care Physician:  Kim Damian APRN  Date of admission: 2023    Subjective   Subjective     Patient seen and chart reviewed, discussed with staff.    Chief Complaint: Depression      HPI:     Staff reports the patient slept well last night.  She has been compliant with medication.  She is noted to have some possible mild confusion.  However, she reported she is asking very appropriate questions.  She takes longer than would be expected to answer questions but does not appear to have any gross cognitive deficits.    Patient's 72-hour hold has  and she was somewhat reluctant to sign in today.  She was very fearful that she may be signing in agreeing to a very long-term treatment.  Processed the need to have her consent to treatment and she was agreeable and did sign in.  Patient today reports that she has been more anxious.  She reports that she is stressed out.  Reports that she is stressed because people in town are talking about her may have some paranoid type thinking.  She also reports that she continues to be depressed.  She reports that she feels like she is in a hole and has no energy.  She denies any auditory or visual hallucinations.  She is tearful during the exam and does appear to be depressed.  She reports that she recently had 18 psych testing at [facility] and we will attempt to get those records to see if there is an antidepressant that she would prefer over others.  She has previously failed duloxetine and escitalopram.  She asked for an increase of aripiprazole and discussed with her that prefer to add an antidepressant not increase the aripiprazole at this time.    Followed by the medicine team today adjusting medications.  She is been started on Lasix.    TY report is pending    Once larger nicotine patch presented 21 mg is agreeable to trying  "the lozenges.  Reports that she is craving cigarettes and feels that getting patches were not effective.      Objective   Objective     Vitals:   Temp:  [97.8 °F (36.6 °C)-98.6 °F (37 °C)] 98.6 °F (37 °C)  Heart Rate:  [72-78] 78  Resp:  [16-18] 16  BP: (102-107)/(68-70) 107/70          Mental Status Exam:      Appearance:   Well-developed, well-nourished, calm, cooperative  Reliability:   Good  Eye Contact:   Good  Concentration/Focus:    Attentive  Behaviors:    No restlessness or agitation  Memory :    Sensorium intact, no deficits  Speech:    Decreased tone, normal rate, fluent  Language: Appropriate, relevant  Mood :    \"Stressed, depressed\"  Affect:    Congruent with stated mood and gets tearful during exam but appropriately so and it is controllable  Thought process:    Goal directed, some possible paranoia  Thought Content:    Denies suicidal or homicidal ideation, denies hallucinations  Insight:   Fair, improving  Judgement:    Intact, no behavioral disturbance      Result Review    Result Review:  I have personally reviewed the results from the time of this admission to 5/9/2023 11:26 EDT and agree with these findings:  []  Laboratory  []  Microbiology  []  Radiology  []  EKG/Telemetry   []  Cardiology/Vascular   []  Pathology  []  Old records  []  Other:  Most notable findings include:     Lab Results (last 24 hours)     Procedure Component Value Units Date/Time    POC Glucose Once [[id number]]  (Abnormal) Collected: 05/09/23 0727    Specimen: Blood Updated: 05/09/23 0729     Glucose 104 mg/dL      Comment: Serial Number: [id number]:  [id number]       POC Glucose Once [[id number]]  (Abnormal) Collected: 05/08/23 2041    Specimen: Blood Updated: 05/08/23 2043     Glucose 184 mg/dL      Comment: Serial Number: [id number]:  [id number]       POC Glucose Once [[id number]]  (Abnormal) Collected: 05/08/23 1615    Specimen: Blood Updated: 05/08/23 1616     Glucose 294 mg/dL      Comment: Serial Number: " [id number]:  [id number]                 Medications:   ARIPiprazole, 15 mg, Oral, Daily  aspirin, 81 mg, Oral, Daily  cetirizine, 10 mg, Oral, Nightly  folic acid, 1 mg, Oral, Daily  furosemide, 40 mg, Oral, BID  insulin detemir, 14 Units, Subcutaneous, Daily  insulin lispro, 2 Units, Subcutaneous, TID With Meals  insulin lispro, 2-7 Units, Subcutaneous, TID With Meals  LORazepam, 0.5 mg, Oral, BID  metoprolol tartrate, 12.5 mg, Oral, Q12H  mirtazapine, 15 mg, Oral, Nightly  nicotine, 1 patch, Transdermal, Q24H  thiamine, 100 mg, Oral, Daily  ticagrelor, 90 mg, Oral, BID          Assessment / Plan       Active Hospital Problems:  Active Hospital Problems    Diagnosis    • **Major depression    • Major depressive disorder, recurrent episode, moderate    • Gastroesophageal reflux disease without esophagitis    • Type 1 diabetes mellitus    • Bipolar 2 disorder    • Hypertensive disorder    • Hyperlipidemia    • Seasonal allergies        Plan:     • We reported TY  • Discontinue nicotine patches and start lozenges  • Add sertraline for depression  • We will get she and site report from her outpatient psychiatric provider, [facility]  • Work on mood stabilization and abatement of any suicidal ideation or psychosis.  Work on appropriate safety plan  • Continue supportive therapy  • Patient to engage in all group and individual treatment modalities available on the unit  • Obtain collateral information if possible  • Titrate medications as clinically indicated  • Work on appropriate disposition follow-up including referrals to substance abuse treatment if indicated      Disposition:  I expect patient to be discharged 2 to 3 days.    Part of this note may be an electronic transcription/translation of spoken language to printed text using the Dragon dictation system.         Electronically signed by Jeremy Meyers MD, 05/09/23, 11:26 AM EDT.

## 2023-05-09 NOTE — PLAN OF CARE
Goal Outcome Evaluation:  Plan of Care Reviewed With: patient  Patient Agreement with Plan of Care: agrees                Pt has been cooperative, but tearful today. She denies SI/HI, AVH and contracts for safety. She rates anxiety 10, depression 8. She expressed many concerns and worries, but accepts reassurance. She is able to make her needs known. Will continue to monitor.

## 2023-05-09 NOTE — PLAN OF CARE
Goal Outcome Evaluation:  Plan of Care Reviewed With: patient  Patient Agreement with Plan of Care: agrees     Progress: improving.  Patient has been up and out of room, mingling with peers in dayroom and is appropriate with interactions.  Patient has mildly confused periods, but generally catches these and self corrects.  Patient has made needs known, and sought appropriate information pertinent to her situation.  Patient was cooperative with pm medications and snack.  Patient denies S/I, H/I and CFS. Patient rates depression at 9 and anxiety at 7/10. Patient encouraged to ask questions and seek information as needed.  Emotional support and safe environment provided.

## 2023-05-09 NOTE — PROGRESS NOTES
[facility]   Hospitalist Progress Note  Date: 2023  Patient Name: Xena Briseno  : 1975  MRN: [id number]  Date of admission: 2023    Subjective   Subjective     Reason for consult: Management of type 1 diabetes mellitus    Interval Followup:   No events overnight.  Denies significant shortness of breath. Still has significant lower extremity pitting edema.  States she previously used an insulin pump but has not used it in over a year.  Is very anxious about her blood sugars when she is discharged.    Objective   Objective     Vitals:   Temp:  [97.8 °F (36.6 °C)-98.6 °F (37 °C)] 98.6 °F (37 °C)  Heart Rate:  [72-78] 78  Resp:  [16-18] 16  BP: (102-107)/(68-70) 107/70  Physical Exam   Gen: No acute distress, sitting up on edge of bed  Resp: CTAB, No w/r/r, No respiratory distress appreciated  Card: RRR, No m/r/g  Abd: Soft, Nontender, Nondistended, + bowel sounds  Lower extremity: 2+ pitting edema to mid shins bilateral    Result Review    Result Review:  I have personally reviewed the results as below and agree with these findings:  []  Laboratory:   CMP        2023    05:15 2023    03:38 2023    19:27   CMP   Glucose 311   356   380     BUN 9   7   10     Creatinine 0.56   0.48   0.69     EGFR 112.7   117.0   107.2     Sodium 139   138   136     Potassium 3.5   3.4   4.6     Chloride 106   104   99     Calcium 7.8   7.9   8.9     Total Protein 4.7    5.8     Albumin 3.0    3.3     Globulin 1.7    2.5     Total Bilirubin <0.2    0.3     Alkaline Phosphatase 93    104     AST (SGOT) 23    39     ALT (SGPT) 27    33     Albumin/Globulin Ratio 1.8    1.3     BUN/Creatinine Ratio 16.1   14.6   14.5     Anion Gap 9.0   6.0   13.4       CBC        2023    05:15 2023    03:38 2023    19:27   CBC   WBC 5.45   5.21   10.60     RBC 3.36   3.43   3.61     Hemoglobin 10.9   11.2   12.4     Hematocrit 32.4   34.7   35.6     MCV 96.4   101.2   98.6     MCH 32.4   32.7   34.3      MCHC 33.6   32.3   34.8     RDW 12.2   12.9   12.8     Platelets 253   230   128          []  Microbiology:   []  Radiology:   []  EKG/Telemetry:    []  Cardiology/Vascular:    []  Pathology:  []  Old records:  []  Other:    Assessment & Plan   Assessment / Plan     Assessment/plan:  Type 1 diabetes mellitus with hyperglycemia  Medical noncompliance  New onset lower extremity swelling with 1+ pitting edema  Hypertension  Hyperlipidemia  Anxiety/depression  Coronary artery disease with drug-eluting stent in January 2023    Continue Levemir 14 units daily, continue 2 units of Humalog with each meal and sliding scale insulin  Continue aspirin and Brilinta as she recently had drug-eluting stent in January  Echocardiogram obtained and reviewed, normal EF, no evidence of diastolic dysfunction  Start Lasix 40 mg p.o. twice daily  Apply compression stockings  Consult diabetic educator  She has not used her insulin pump in over a year so will likely need long and short acting insulin at discharge  Continue with scheduled metoprolol, blood pressures acceptable  Trend renal function and electrolytes with a.m. BMP, magnesium   Trend Hgb and WBC with a.m. CBC    Personally reviewed patients labs and imaging, discussed with patient and nurse at bedside. Discussed management with the following psychiatry.  Thank you for allowing me to share in the care of this patient, please feel free to contact me with any additional questions or concerns.    Discussed case with: Bedside RN, psychiatry    CODE STATUS:   Code Status (Patient has no pulse and is not breathing): CPR (Attempt to Resuscitate)  Medical Interventions (Patient has pulse or is breathing): Full Support  Release to patient: Routine Release

## 2023-05-10 LAB
ANION GAP SERPL CALCULATED.3IONS-SCNC: 6.6 MMOL/L (ref 5–15)
BASOPHILS # BLD AUTO: 0.05 10*3/MM3 (ref 0–0.2)
BASOPHILS NFR BLD AUTO: 0.7 % (ref 0–1.5)
BUN SERPL-MCNC: 10 MG/DL (ref 6–20)
BUN/CREAT SERPL: 24.4 (ref 7–25)
CALCIUM SPEC-SCNC: 8.6 MG/DL (ref 8.6–10.5)
CHLORIDE SERPL-SCNC: 104 MMOL/L (ref 98–107)
CO2 SERPL-SCNC: 29.4 MMOL/L (ref 22–29)
CREAT SERPL-MCNC: 0.41 MG/DL (ref 0.57–1)
DEPRECATED RDW RBC AUTO: 44.7 FL (ref 37–54)
EGFRCR SERPLBLD CKD-EPI 2021: 121.5 ML/MIN/1.73
EOSINOPHIL # BLD AUTO: 0.17 10*3/MM3 (ref 0–0.4)
EOSINOPHIL NFR BLD AUTO: 2.5 % (ref 0.3–6.2)
ERYTHROCYTE [DISTWIDTH] IN BLOOD BY AUTOMATED COUNT: 12.7 % (ref 12.3–15.4)
GLUCOSE BLDC GLUCOMTR-MCNC: 155 MG/DL (ref 70–99)
GLUCOSE BLDC GLUCOMTR-MCNC: 164 MG/DL (ref 70–99)
GLUCOSE BLDC GLUCOMTR-MCNC: 230 MG/DL (ref 70–99)
GLUCOSE BLDC GLUCOMTR-MCNC: 337 MG/DL (ref 70–99)
GLUCOSE BLDC GLUCOMTR-MCNC: 347 MG/DL (ref 70–99)
GLUCOSE BLDC GLUCOMTR-MCNC: 73 MG/DL (ref 70–99)
GLUCOSE BLDC GLUCOMTR-MCNC: 80 MG/DL (ref 70–99)
GLUCOSE SERPL-MCNC: 77 MG/DL (ref 65–99)
HCT VFR BLD AUTO: 33.9 % (ref 34–46.6)
HGB BLD-MCNC: 11.6 G/DL (ref 12–15.9)
IMM GRANULOCYTES # BLD AUTO: 0.08 10*3/MM3 (ref 0–0.05)
IMM GRANULOCYTES NFR BLD AUTO: 1.2 % (ref 0–0.5)
LYMPHOCYTES # BLD AUTO: 3.25 10*3/MM3 (ref 0.7–3.1)
LYMPHOCYTES NFR BLD AUTO: 47 % (ref 19.6–45.3)
MAGNESIUM SERPL-MCNC: 2 MG/DL (ref 1.6–2.6)
MCH RBC QN AUTO: 33.2 PG (ref 26.6–33)
MCHC RBC AUTO-ENTMCNC: 34.2 G/DL (ref 31.5–35.7)
MCV RBC AUTO: 97.1 FL (ref 79–97)
MONOCYTES # BLD AUTO: 0.66 10*3/MM3 (ref 0.1–0.9)
MONOCYTES NFR BLD AUTO: 9.5 % (ref 5–12)
NEUTROPHILS NFR BLD AUTO: 2.71 10*3/MM3 (ref 1.7–7)
NEUTROPHILS NFR BLD AUTO: 39.1 % (ref 42.7–76)
NRBC BLD AUTO-RTO: 0 /100 WBC (ref 0–0.2)
PLATELET # BLD AUTO: 413 10*3/MM3 (ref 140–450)
PMV BLD AUTO: 9.6 FL (ref 6–12)
POTASSIUM SERPL-SCNC: 4.1 MMOL/L (ref 3.5–5.2)
RBC # BLD AUTO: 3.49 10*6/MM3 (ref 3.77–5.28)
SODIUM SERPL-SCNC: 140 MMOL/L (ref 136–145)
WBC NRBC COR # BLD: 6.92 10*3/MM3 (ref 3.4–10.8)

## 2023-05-10 PROCEDURE — 80048 BASIC METABOLIC PNL TOTAL CA: CPT | Performed by: INTERNAL MEDICINE

## 2023-05-10 PROCEDURE — 99232 SBSQ HOSP IP/OBS MODERATE 35: CPT | Performed by: INTERNAL MEDICINE

## 2023-05-10 PROCEDURE — 63710000001 INSULIN LISPRO (HUMAN) PER 5 UNITS: Performed by: INTERNAL MEDICINE

## 2023-05-10 PROCEDURE — 82948 REAGENT STRIP/BLOOD GLUCOSE: CPT

## 2023-05-10 PROCEDURE — 63710000001 INSULIN DETEMIR PER 5 UNITS: Performed by: INTERNAL MEDICINE

## 2023-05-10 PROCEDURE — 83735 ASSAY OF MAGNESIUM: CPT | Performed by: INTERNAL MEDICINE

## 2023-05-10 PROCEDURE — 85025 COMPLETE CBC W/AUTO DIFF WBC: CPT | Performed by: INTERNAL MEDICINE

## 2023-05-10 RX ORDER — LORAZEPAM 0.5 MG/1
0.5 TABLET ORAL EVERY 8 HOURS PRN
Status: DISCONTINUED | OUTPATIENT
Start: 2023-05-10 | End: 2023-05-13 | Stop reason: HOSPADM

## 2023-05-10 RX ORDER — INSULIN LISPRO 100 [IU]/ML
3-14 INJECTION, SOLUTION INTRAVENOUS; SUBCUTANEOUS
Status: DISCONTINUED | OUTPATIENT
Start: 2023-05-10 | End: 2023-05-13 | Stop reason: HOSPADM

## 2023-05-10 RX ORDER — NICOTINE POLACRILEX 4 MG
15 LOZENGE BUCCAL
Status: DISCONTINUED | OUTPATIENT
Start: 2023-05-10 | End: 2023-05-13 | Stop reason: HOSPADM

## 2023-05-10 RX ORDER — DEXTROSE MONOHYDRATE 25 G/50ML
25 INJECTION, SOLUTION INTRAVENOUS
Status: DISCONTINUED | OUTPATIENT
Start: 2023-05-10 | End: 2023-05-13 | Stop reason: HOSPADM

## 2023-05-10 RX ORDER — INSULIN LISPRO 100 [IU]/ML
5 INJECTION, SOLUTION INTRAVENOUS; SUBCUTANEOUS
Status: DISCONTINUED | OUTPATIENT
Start: 2023-05-10 | End: 2023-05-11

## 2023-05-10 RX ORDER — VILAZODONE HYDROCHLORIDE 20 MG/1
20 TABLET ORAL DAILY
Status: DISCONTINUED | OUTPATIENT
Start: 2023-05-10 | End: 2023-05-13 | Stop reason: HOSPADM

## 2023-05-10 RX ADMIN — FUROSEMIDE 40 MG: 40 TABLET ORAL at 09:08

## 2023-05-10 RX ADMIN — TICAGRELOR 90 MG: 90 TABLET ORAL at 20:35

## 2023-05-10 RX ADMIN — LORAZEPAM 0.5 MG: 0.5 TABLET ORAL at 21:09

## 2023-05-10 RX ADMIN — Medication 1 MG: at 09:08

## 2023-05-10 RX ADMIN — TICAGRELOR 90 MG: 90 TABLET ORAL at 09:07

## 2023-05-10 RX ADMIN — INSULIN LISPRO 10 UNITS: 100 INJECTION, SOLUTION INTRAVENOUS; SUBCUTANEOUS at 11:30

## 2023-05-10 RX ADMIN — VILAZODONE HYDROCHLORIDE 20 MG: 20 TABLET ORAL at 16:25

## 2023-05-10 RX ADMIN — INSULIN LISPRO 5 UNITS: 100 INJECTION, SOLUTION INTRAVENOUS; SUBCUTANEOUS at 11:28

## 2023-05-10 RX ADMIN — INSULIN LISPRO 5 UNITS: 100 INJECTION, SOLUTION INTRAVENOUS; SUBCUTANEOUS at 16:25

## 2023-05-10 RX ADMIN — ASPIRIN 81 MG: 81 TABLET, COATED ORAL at 09:07

## 2023-05-10 RX ADMIN — METOPROLOL TARTRATE 12.5 MG: 25 TABLET, FILM COATED ORAL at 09:07

## 2023-05-10 RX ADMIN — SERTRALINE HYDROCHLORIDE 50 MG: 50 TABLET ORAL at 09:08

## 2023-05-10 RX ADMIN — CETIRIZINE HYDROCHLORIDE 10 MG: 10 TABLET, FILM COATED ORAL at 20:35

## 2023-05-10 RX ADMIN — INSULIN LISPRO 3 UNITS: 100 INJECTION, SOLUTION INTRAVENOUS; SUBCUTANEOUS at 09:06

## 2023-05-10 RX ADMIN — METOPROLOL TARTRATE 12.5 MG: 25 TABLET, FILM COATED ORAL at 20:35

## 2023-05-10 RX ADMIN — INSULIN DETEMIR 20 UNITS: 100 INJECTION, SOLUTION SUBCUTANEOUS at 15:04

## 2023-05-10 RX ADMIN — NICOTINE 1 PATCH: 21 PATCH, EXTENDED RELEASE TRANSDERMAL at 09:08

## 2023-05-10 RX ADMIN — HYDROXYZINE PAMOATE 25 MG: 25 CAPSULE ORAL at 12:40

## 2023-05-10 RX ADMIN — HYDROXYZINE PAMOATE 25 MG: 25 CAPSULE ORAL at 18:37

## 2023-05-10 RX ADMIN — ARIPIPRAZOLE 15 MG: 15 TABLET ORAL at 09:07

## 2023-05-10 RX ADMIN — LORAZEPAM 0.5 MG: 0.5 TABLET ORAL at 09:08

## 2023-05-10 RX ADMIN — ACETAMINOPHEN 650 MG: 325 TABLET ORAL at 12:40

## 2023-05-10 RX ADMIN — EMPAGLIFLOZIN 10 MG: 10 TABLET, FILM COATED ORAL at 15:04

## 2023-05-10 RX ADMIN — INSULIN DETEMIR 14 UNITS: 100 INJECTION, SOLUTION SUBCUTANEOUS at 09:07

## 2023-05-10 RX ADMIN — Medication 100 MG: at 09:08

## 2023-05-10 RX ADMIN — INSULIN LISPRO 5 UNITS: 100 INJECTION, SOLUTION INTRAVENOUS; SUBCUTANEOUS at 16:27

## 2023-05-10 NOTE — PLAN OF CARE
Goal Outcome Evaluation:  Plan of Care Reviewed With: patient  Patient Agreement with Plan of Care: agrees     Progress: improving     Patient calm and cooperative. Denies SI/HI/AVH. Rates anxiety 7, depression 6. Able to make needs known. Will continue to monitor.

## 2023-05-10 NOTE — PLAN OF CARE
Goal Outcome Evaluation:  Plan of Care Reviewed With: patient  Patient Agreement with Plan of Care: agrees     Progress: improving.  Patient has been up and about the unit, making phone calls and interacting with peers. Patient makes needs known readily and appropriately.  Patient continues to state she is anxious, but it is observably decreased as evidenced by less facial grimacing, being more engaging and seeking information on medications and coping.  Patient did not have visitors, but did talk to family and  on phone.  Patient denies S/I, H/I and A/V/H, and CFS.  Patient's pm FSBG was elevated at 266 for HS, and PA for Hospitalist group was contacted, new orders received for insulin correction.  Patient was cooperative with snack and pm medications.  Supportive care and safe environment provided.

## 2023-05-10 NOTE — PROGRESS NOTES
[facility]   Hospitalist Progress Note  Date: 5/10/2023  Patient Name: Xena Briseno  : 1975  MRN: [id number]  Date of admission: 2023    Subjective   Subjective     Reason for consult: Management of type 1 diabetes mellitus    Interval Followup:   No events overnight.  Lower extremity edema improving.  Very anxious about going home and giving herself insulin again.  Blood sugars intermittently elevated.    Objective   Objective     Vitals:   Temp:  [97.8 °F (36.6 °C)-98.6 °F (37 °C)] 98.6 °F (37 °C)  Heart Rate:  [57-83] 83  Resp:  [18] 18  BP: ()/(50-65) 102/52  Physical Exam   Gen: No acute distress, sitting up on edge of bed, appears anxious  Resp: CTAB, No w/r/r, No respiratory distress appreciated  Card: RRR, No m/r/g  Abd: Soft, Nontender, Nondistended, + bowel sounds  Lower extremity: Improved but persistent trace lower extremity edema    Result Review    Result Review:  I have personally reviewed the results as below and agree with these findings:  []  Laboratory: Personally reviewed BMP, blood sugars, magnesium level, CBC  CMP        2023    03:38 2023    19:27 5/10/2023    05:33   CMP   Glucose 356   380   77     BUN 7   10   10     Creatinine 0.48   0.69   0.41     EGFR 117.0   107.2   121.5     Sodium 138   136   140     Potassium 3.4   4.6   4.1     Chloride 104   99   104     Calcium 7.9   8.9   8.6     Total Protein  5.8      Albumin  3.3      Globulin  2.5      Total Bilirubin  0.3      Alkaline Phosphatase  104      AST (SGOT)  39      ALT (SGPT)  33      Albumin/Globulin Ratio  1.3      BUN/Creatinine Ratio 14.6   14.5   24.4     Anion Gap 6.0   13.4   6.6       CBC        2023    03:38 2023    19:27 5/10/2023    05:33   CBC   WBC 5.21   10.60   6.92     RBC 3.43   3.61   3.49     Hemoglobin 11.2   12.4   11.6     Hematocrit 34.7   35.6   33.9     .2   98.6   97.1     MCH 32.7   34.3   33.2     MCHC 32.3   34.8   34.2     RDW 12.9   12.8   12.7      Platelets 230   128   413          []  Microbiology:   []  Radiology:   []  EKG/Telemetry:    []  Cardiology/Vascular:    []  Pathology:  []  Old records:  []  Other:    Assessment & Plan   Assessment / Plan     Assessment/plan:  Type 1 diabetes mellitus with hyperglycemia, A1c 11.2  Medical noncompliance  Hypoalbuminemia leading to lower extremity edema  Hypertension  Hyperlipidemia  Anxiety/depression  Coronary artery disease with drug-eluting stent in January 2023    Hyperglycemia noted, increase Levemir to 20 units twice daily, scheduled Humalog 5 units 3 times daily with meals, increase dose with sliding scale insulin  Start oral Jardiance 10 mg daily  Start metformin 500 mg daily  Continue aspirin and Brilinta as she recently had drug-eluting stent in January  Echocardiogram obtained and reviewed, normal EF, no evidence of diastolic dysfunction  DC oral Lasix.  Lower extremity swelling likely related to hypoalbuminemia.  Encouraged oral intake and increase protein in diet  Continue with compression stockings  Consult diabetic educator  She has not used her insulin pump in over a year so will likely need long acting insulin at discharge  Continue with scheduled metoprolol, blood pressures acceptable  Lab holiday tomorrow    Personally reviewed patients labs and imaging, discussed with patient and nurse at bedside. Discussed management with the following psychiatry.  Thank you for allowing me to share in the care of this patient, please feel free to contact me with any additional questions or concerns.    Discussed case with: Bedside RN, psychiatry    CODE STATUS:   Code Status (Patient has no pulse and is not breathing): CPR (Attempt to Resuscitate)  Medical Interventions (Patient has pulse or is breathing): Full Support  Release to patient: Routine Release

## 2023-05-10 NOTE — PROGRESS NOTES
[facility]     Psychiatric Progress Note    Patient Name: Xena Briseno  : 1975  MRN: [id number]  Primary Care Physician:  Kim Damian APRN  Date of admission: 2023    Subjective   Subjective     Patient seen and chart reviewed, discussed with staff.    Chief Complaint: Depression      HPI:     Staff reports the patient is denying suicidal homicidal ideation.  She is noted to slept well last night.  She had no visitors.  Reported her anxiety as a 7 and depression as a 6 yesterday.      Has been seen by the medicine team now has compression stockings on to help with edema.  Insulin has been increased.    Patient today appears to have improved from previous days.  She is calm and cooperative.  She seems to be processing cognitively faster and better than previous days.  Her ADLs are attended to and she is calm and cooperative.  She is very engaging.  She is engaged in her treatment and asking appropriate questions.  We did go over medications and [employer] testing that has been obtained from her outpatient provider.    Patient reports she did well on Viibryd in the past and it is a medication that is in the acceptable and use per regular guidelines on her [employer] psych testing.  She has been off this medicine for some time feels that she would like to go back on it because she did well on it.  We will initiate Viibryd 20 mg daily and discontinue sertraline.  Sertraline is a medicine that would not be preferred according to her [employer] testing.    The patient reports that she continues to have lots of anxiety and does appear somewhat anxious during exam.  She asked about lorazepam and we discussed that not being a good medication and she wants it discontinued and we will discontinue it.  She reports that she feels she does want something for anxiety and she does have Vistaril on an as needed basis.  Would consider addition of buspirone but wanted to try to avoid any further  "polypharmacy.    Patient shown improvement on aripiprazole.  The testing indicates that she will need lower doses of this and there is the potential of other nonspecific interactions but she is tolerating the medication well and we will continue its current dose.    Edema is reduced and wearing compression stockings      Objective   Objective     Vitals:   Temp:  [97.8 °F (36.6 °C)-98.6 °F (37 °C)] 98.6 °F (37 °C)  Heart Rate:  [57-83] 83  Resp:  [18] 18  BP: ()/(50-65) 102/52    Per medicine team      Mental Status Exam:      Appearance:   Calm, cooperative, participates fully in interview, no restlessness or agitation  Reliability:   Good  Eye Contact:   Good  Concentration/Focus:    Attentive to the interview, no distractions  Behaviors:    Appropriate  Memory :    Sensorium intact, no deficits  Speech:    Normal rate and volume  Language:   Appropriate, relevant  Mood :    \"Anxious and aggravated\"  Affect:    Slightly anxious but appropriate, constricted  Thought process:    Linear, goal directed  Thought Content:    Denies suicidal or homicidal ideation, no auditory visualizations  Insight:   Good, continues to improve  Judgement:    Intact, no behavioral disturbance      Result Review    Result Review:  I have personally reviewed the results from the time of this admission to 5/10/2023 15:43 EDT and agree with these findings:  []  Laboratory  []  Microbiology  []  Radiology  []  EKG/Telemetry   []  Cardiology/Vascular   []  Pathology  []  Old records  []  Other:  Most notable findings include:     Lab Results (last 24 hours)     Procedure Component Value Units Date/Time    POC Glucose Once [[id number]]  (Abnormal) Collected: 05/10/23 1237    Specimen: Blood Updated: 05/10/23 1239     Glucose 347 mg/dL      Comment: Serial Number: [id number]:  [id number]       POC Glucose Once [[id number]]  (Abnormal) Collected: 05/10/23 1124    Specimen: Blood Updated: 05/10/23 1125     Glucose 337 mg/dL      " Comment: Serial Number: [id number]:  [id number]       POC Glucose Once [[id number]]  (Abnormal) Collected: 05/10/23 0858    Specimen: Blood Updated: 05/10/23 0900     Glucose 164 mg/dL      Comment: Serial Number: [id number]:  [id number]       Magnesium [[id number]]  (Normal) Collected: 05/10/23 0533    Specimen: Blood Updated: 05/10/23 0610     Magnesium 2.0 mg/dL     Basic Metabolic Panel [[id number]]  (Abnormal) Collected: 05/10/23 0533    Specimen: Blood Updated: 05/10/23 0610     Glucose 77 mg/dL      BUN 10 mg/dL      Creatinine 0.41 mg/dL      Sodium 140 mmol/L      Potassium 4.1 mmol/L      Chloride 104 mmol/L      CO2 29.4 mmol/L      Calcium 8.6 mg/dL      BUN/Creatinine Ratio 24.4     Anion Gap 6.6 mmol/L      eGFR 121.5 mL/min/1.73     Narrative:      GFR Normal >60  Chronic Kidney Disease <60  Kidney Failure <15      CBC & Differential [[id number]]  (Abnormal) Collected: 05/10/23 0533    Specimen: Blood Updated: 05/10/23 0541    Narrative:      The following orders were created for panel order CBC & Differential.  Procedure                               Abnormality         Status                     ---------                               -----------         ------                     CBC Auto Differential[[id number]]        Abnormal            Final result                 Please view results for these tests on the individual orders.    CBC Auto Differential [[id number]]  (Abnormal) Collected: 05/10/23 0533    Specimen: Blood Updated: 05/10/23 0541     WBC 6.92 10*3/mm3      RBC 3.49 10*6/mm3      Hemoglobin 11.6 g/dL      Hematocrit 33.9 %      MCV 97.1 fL      MCH 33.2 pg      MCHC 34.2 g/dL      RDW 12.7 %      RDW-SD 44.7 fl      MPV 9.6 fL      Platelets 413 10*3/mm3      Neutrophil % 39.1 %      Lymphocyte % 47.0 %      Monocyte % 9.5 %      Eosinophil % 2.5 %      Basophil % 0.7 %      Immature Grans % 1.2 %      Neutrophils, Absolute 2.71 10*3/mm3      Lymphocytes, Absolute 3.25 10*3/mm3       Monocytes, Absolute 0.66 10*3/mm3      Eosinophils, Absolute 0.17 10*3/mm3      Basophils, Absolute 0.05 10*3/mm3      Immature Grans, Absolute 0.08 10*3/mm3      nRBC 0.0 /100 WBC     POC Glucose Once [[id number]]  (Abnormal) Collected: 05/09/23 2054    Specimen: Blood Updated: 05/09/23 2056     Glucose 266 mg/dL      Comment: Serial Number: [id number]:  [id number]       POC Glucose Once [[id number]]  (Abnormal) Collected: 05/09/23 1556    Specimen: Blood Updated: 05/09/23 1558     Glucose 284 mg/dL      Comment: Serial Number: [id number]:  [id number]                 Medications:   ARIPiprazole, 15 mg, Oral, Daily  aspirin, 81 mg, Oral, Daily  cetirizine, 10 mg, Oral, Nightly  empagliflozin, 10 mg, Oral, Daily  folic acid, 1 mg, Oral, Daily  insulin detemir, 20 Units, Subcutaneous, BID  insulin lispro, 3-14 Units, Subcutaneous, TID With Meals  insulin lispro, 5 Units, Subcutaneous, TID With Meals  [START ON 5/11/2023] metFORMIN, 500 mg, Oral, Daily With Breakfast  metoprolol tartrate, 12.5 mg, Oral, Q12H  nicotine, 1 patch, Transdermal, Q24H  thiamine, 100 mg, Oral, Daily  ticagrelor, 90 mg, Oral, BID  vilazodone, 20 mg, Oral, Daily          Assessment / Plan       Active Hospital Problems:  Active Hospital Problems    Diagnosis    • **Major depression    • Major depressive disorder, recurrent episode, moderate    • Gastroesophageal reflux disease without esophagitis    • Type 1 diabetes mellitus    • Bipolar 2 disorder    • Hypertensive disorder    • Hyperlipidemia    • Seasonal allergies        Plan:     • Discontinue sertraline and mirtazapine simplify medication regimen and these medicines may not interact well with her according to [employer] testing  • Initiate Viibryd  • Discontinue lorazepam and will avoid benzodiazepines  • Medicine to follow and adjust insulin accordingly  • Continue compression stockings   • work on mood stabilization and abatement of any suicidal ideation or psychosis.   Work on appropriate safety plan  • Continue supportive therapy  • Patient to engage in all group and individual treatment modalities available on the unit  • Obtain collateral information if possible  • Titrate medications as clinically indicated  • Work on appropriate disposition follow-up including referrals to substance abuse treatment if indicated      Disposition:  I expect patient to be discharged 1 to 2 days.    Part of this note may be an electronic transcription/translation of spoken language to printed text using the Dragon dictation system.         Electronically signed by Jeremy Meyers MD, 05/10/23, 3:43 PM EDT.

## 2023-05-11 LAB
GLUCOSE BLDC GLUCOMTR-MCNC: 105 MG/DL (ref 70–99)
GLUCOSE BLDC GLUCOMTR-MCNC: 106 MG/DL (ref 70–99)
GLUCOSE BLDC GLUCOMTR-MCNC: 140 MG/DL (ref 70–99)
GLUCOSE BLDC GLUCOMTR-MCNC: 159 MG/DL (ref 70–99)
GLUCOSE BLDC GLUCOMTR-MCNC: 303 MG/DL (ref 70–99)
GLUCOSE BLDC GLUCOMTR-MCNC: 355 MG/DL (ref 70–99)
GLUCOSE BLDC GLUCOMTR-MCNC: 47 MG/DL (ref 70–99)
GLUCOSE BLDC GLUCOMTR-MCNC: 60 MG/DL (ref 70–99)

## 2023-05-11 PROCEDURE — 63710000001 INSULIN LISPRO (HUMAN) PER 5 UNITS: Performed by: INTERNAL MEDICINE

## 2023-05-11 PROCEDURE — 63710000001 INSULIN LISPRO (HUMAN) PER 5 UNITS

## 2023-05-11 PROCEDURE — 63710000001 INSULIN DETEMIR PER 5 UNITS: Performed by: INTERNAL MEDICINE

## 2023-05-11 PROCEDURE — 99232 SBSQ HOSP IP/OBS MODERATE 35: CPT | Performed by: INTERNAL MEDICINE

## 2023-05-11 PROCEDURE — 82948 REAGENT STRIP/BLOOD GLUCOSE: CPT

## 2023-05-11 RX ORDER — TRAZODONE HYDROCHLORIDE 50 MG/1
50 TABLET ORAL EVERY 6 HOURS PRN
Status: DISCONTINUED | OUTPATIENT
Start: 2023-05-11 | End: 2023-05-13 | Stop reason: HOSPADM

## 2023-05-11 RX ORDER — INSULIN LISPRO 100 [IU]/ML
3 INJECTION, SOLUTION INTRAVENOUS; SUBCUTANEOUS
Status: DISCONTINUED | OUTPATIENT
Start: 2023-05-11 | End: 2023-05-11

## 2023-05-11 RX ORDER — INSULIN LISPRO 100 [IU]/ML
5 INJECTION, SOLUTION INTRAVENOUS; SUBCUTANEOUS ONCE
Status: COMPLETED | OUTPATIENT
Start: 2023-05-12 | End: 2023-05-11

## 2023-05-11 RX ORDER — INSULIN LISPRO 100 [IU]/ML
5 INJECTION, SOLUTION INTRAVENOUS; SUBCUTANEOUS
Status: DISCONTINUED | OUTPATIENT
Start: 2023-05-11 | End: 2023-05-12

## 2023-05-11 RX ADMIN — TICAGRELOR 90 MG: 90 TABLET ORAL at 21:11

## 2023-05-11 RX ADMIN — LORAZEPAM 0.5 MG: 0.5 TABLET ORAL at 09:21

## 2023-05-11 RX ADMIN — TICAGRELOR 90 MG: 90 TABLET ORAL at 08:33

## 2023-05-11 RX ADMIN — INSULIN LISPRO 3 UNITS: 100 INJECTION, SOLUTION INTRAVENOUS; SUBCUTANEOUS at 11:37

## 2023-05-11 RX ADMIN — TRAZODONE HYDROCHLORIDE 50 MG: 50 TABLET ORAL at 21:12

## 2023-05-11 RX ADMIN — Medication 100 MG: at 08:33

## 2023-05-11 RX ADMIN — METFORMIN HYDROCHLORIDE 500 MG: 500 TABLET ORAL at 08:33

## 2023-05-11 RX ADMIN — ASPIRIN 81 MG: 81 TABLET, COATED ORAL at 08:33

## 2023-05-11 RX ADMIN — Medication 1 MG: at 08:33

## 2023-05-11 RX ADMIN — NICOTINE 1 PATCH: 21 PATCH, EXTENDED RELEASE TRANSDERMAL at 08:33

## 2023-05-11 RX ADMIN — LORAZEPAM 0.5 MG: 0.5 TABLET ORAL at 17:26

## 2023-05-11 RX ADMIN — ARIPIPRAZOLE 15 MG: 15 TABLET ORAL at 08:33

## 2023-05-11 RX ADMIN — CETIRIZINE HYDROCHLORIDE 10 MG: 10 TABLET, FILM COATED ORAL at 21:11

## 2023-05-11 RX ADMIN — METOPROLOL TARTRATE 12.5 MG: 25 TABLET, FILM COATED ORAL at 21:11

## 2023-05-11 RX ADMIN — INSULIN LISPRO 5 UNITS: 100 INJECTION, SOLUTION INTRAVENOUS; SUBCUTANEOUS at 23:14

## 2023-05-11 RX ADMIN — VILAZODONE HYDROCHLORIDE 20 MG: 20 TABLET ORAL at 08:33

## 2023-05-11 RX ADMIN — METOPROLOL TARTRATE 12.5 MG: 25 TABLET, FILM COATED ORAL at 08:33

## 2023-05-11 RX ADMIN — INSULIN LISPRO 5 UNITS: 100 INJECTION, SOLUTION INTRAVENOUS; SUBCUTANEOUS at 16:39

## 2023-05-11 RX ADMIN — INSULIN LISPRO 10 UNITS: 100 INJECTION, SOLUTION INTRAVENOUS; SUBCUTANEOUS at 11:36

## 2023-05-11 RX ADMIN — INSULIN DETEMIR 10 UNITS: 100 INJECTION, SOLUTION SUBCUTANEOUS at 08:37

## 2023-05-11 RX ADMIN — ACETAMINOPHEN 650 MG: 325 TABLET ORAL at 17:24

## 2023-05-11 NOTE — NURSING NOTE
Blood Sugar Checks  2006: 73 - snacks provided (sandwich, chips, milk)  2137: 155    On-call FLOR AMAYA, notified of scheduled 20 units Levemir due.  Per chart, last dose 20 units Levemir was given at 1500 today  Per Sheeba RAY, hold tonight's dose of Levemir due to last dose being given at 1500.    2345: 80 - snacks provided (peanut butter crackers, apple juice)    0145: 105
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
## 2023-05-11 NOTE — PLAN OF CARE
Goal Outcome Evaluation:  Plan of Care Reviewed With: patient  Patient Agreement with Plan of Care: agrees     Progress: improving          Patient rating anxiety 7/10 and depression 6/10. Denies HI/SI. Denies AVH. Patient wishes to use Ativan for anxiety. MD notified. See orders. Nightly scheduled 20 units Levemir held per FLOR Huang due to administration at 1500 today. Able to make needs known. Patient calm and cooperative. Expresses concern about managing blood sugar at home upon discharge. Will continue plan of care and provide a safe patient environment.

## 2023-05-11 NOTE — PROGRESS NOTES
[facility]   Hospitalist Progress Note  Date: 2023  Patient Name: Xena Briseno  : 1975  MRN: [id number]  Date of admission: 2023    Subjective   Subjective     Reason for consult: Management of type 1 diabetes mellitus    Interval Followup:   Hypoglycemic this morning in the 40s.  Corrected with juice.  Had difficulty tolerating metformin due to GI upset and is requesting we stop this.      Objective   Objective     Vitals:   Temp:  [97.4 °F (36.3 °C)-98.5 °F (36.9 °C)] 98.5 °F (36.9 °C)  Heart Rate:  [74-78] 78  Resp:  [16] 16  BP: (104-115)/(69-72) 104/69  Physical Exam   Gen: No acute distress, sitting up on edge of bed, appears anxious  Resp: CTAB, No w/r/r, no respiratory distress appreciated  Card: RRR, No MRG   Abd: Soft, Nontender, Nondistended, + bowel sounds  Lower extremity: Resolved lower extremity edema    Result Review    Result Review:  I have personally reviewed the results as below and agree with these findings:  []  Laboratory: Personally reviewed BMP, blood sugars, magnesium level, CBC  CMP        2023    03:38 2023    19:27 5/10/2023    05:33   CMP   Glucose 356   380   77     BUN 7   10   10     Creatinine 0.48   0.69   0.41     EGFR 117.0   107.2   121.5     Sodium 138   136   140     Potassium 3.4   4.6   4.1     Chloride 104   99   104     Calcium 7.9   8.9   8.6     Total Protein  5.8      Albumin  3.3      Globulin  2.5      Total Bilirubin  0.3      Alkaline Phosphatase  104      AST (SGOT)  39      ALT (SGPT)  33      Albumin/Globulin Ratio  1.3      BUN/Creatinine Ratio 14.6   14.5   24.4     Anion Gap 6.0   13.4   6.6       CBC        2023    03:38 2023    19:27 5/10/2023    05:33   CBC   WBC 5.21   10.60   6.92     RBC 3.43   3.61   3.49     Hemoglobin 11.2   12.4   11.6     Hematocrit 34.7   35.6   33.9     .2   98.6   97.1     MCH 32.7   34.3   33.2     MCHC 32.3   34.8   34.2     RDW 12.9   12.8   12.7     Platelets 230   128   413           []  Microbiology:   []  Radiology:   []  EKG/Telemetry:    []  Cardiology/Vascular:    []  Pathology:  []  Old records:  []  Other:    Assessment & Plan   Assessment / Plan     Assessment/plan:  Hypoglycemia  Type 1 diabetes mellitus with hyperglycemia, A1c 11.2  Medical noncompliance  Hypoalbuminemia leading to lower extremity edema  Hypertension  Hyperlipidemia  Anxiety/depression  Coronary artery disease with drug-eluting stent in January 2023    Adjust Levemir to 25 units daily, avoid nocturnal long-acting insulin.  Schedule Humalog 5 units 3 times daily with meals, continue sliding scale insulin.  Will tolerate blood sugars in the 2-300s at this time until she can have her insulin pump reestablished   Discontinue Jardiance and metformin  Continue aspirin and Brilinta as she recently had drug-eluting stent in January  Echocardiogram obtained and reviewed, normal EF, no evidence of diastolic dysfunction  DC oral Lasix.  Lower extremity swelling likely related to hypoalbuminemia.  Encouraged oral intake and increase protein in diet  Continue with compression stockings as needed  Consult diabetic educator  Continue with scheduled metoprolol, blood pressures acceptable  Lab holiday tomorrow    Personally reviewed patients labs and imaging, discussed with patient and nurse at bedside. Discussed management with the following psychiatry.  Thank you for allowing me to share in the care of this patient, please feel free to contact me with any additional questions or concerns.    Discussed case with: Bedside RN, psychiatry    CODE STATUS:   Code Status (Patient has no pulse and is not breathing): CPR (Attempt to Resuscitate)  Medical Interventions (Patient has pulse or is breathing): Full Support  Release to patient: Routine Release

## 2023-05-11 NOTE — SIGNIFICANT NOTE
"   05/10/23 1530   Individual Counseling   Length of Session 30   Topic Individual session to discuss progres of goals.   Patient Response \"I feel so scared, to drive, to do anything, what if I am driving and my sugar get's low.\"     Patient is engaged to scale her mood as \"anxious\" she shares that she is fearful she cannot manage her blood sugars and what will happen on discharge. Patient requesting diabetes education, reminded patient that any education that is specific to an insulin pump and how to manage it would be conducted on an outpatient level of care. Pt would like to meet with staff in the morning to obtain general information regarding insulin pump. Staff agreed to meet with pt and review online articles regarding pump education.     Pt is calm, labile throughout session, anxiety is evident per her self disclosure. She verbalizes that she has not been taking care of her diabetes and does accept that she will need to manage this to stabilize all other presenting problem to include her mental health. Session seemed to increase her insight, will follow up in the am to further discuss.   "

## 2023-05-11 NOTE — PLAN OF CARE
Goal Outcome Evaluation:  Plan of Care Reviewed With: patient  Patient Agreement with Plan of Care: agrees   PATIENT ALERT AND ORIENTED AND CALM AND COOPERATIVE WITH STAFF. COMPLIANT WITH ALL MEDICATIONS. PATIENT DENIES S/I, H/I OR HALLUCINATIONS. PATIENT LITTLE BIT ANXIOUS RELATED TO DISCHARGING AND WORRIED ABOUT TAKING CARE OF HER DIABETES. NO INAPPROPRIATE OR AGGRESSIVE BEHAVIOR NOTED. WILL CONTINUE TO MONITOR FOR ANY CHANGES IN PATIENT'S CONDITION.
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
## 2023-05-12 PROBLEM — F43.10 POST TRAUMATIC STRESS DISORDER (PTSD): Status: ACTIVE | Noted: 2023-05-12

## 2023-05-12 LAB
GLUCOSE BLDC GLUCOMTR-MCNC: 103 MG/DL (ref 70–99)
GLUCOSE BLDC GLUCOMTR-MCNC: 170 MG/DL (ref 70–99)
GLUCOSE BLDC GLUCOMTR-MCNC: 186 MG/DL (ref 70–99)
GLUCOSE BLDC GLUCOMTR-MCNC: 201 MG/DL (ref 70–99)
GLUCOSE BLDC GLUCOMTR-MCNC: 225 MG/DL (ref 70–99)
GLUCOSE BLDC GLUCOMTR-MCNC: 265 MG/DL (ref 70–99)
GLUCOSE BLDC GLUCOMTR-MCNC: 332 MG/DL (ref 70–99)
GLUCOSE BLDC GLUCOMTR-MCNC: 346 MG/DL (ref 70–99)
GLUCOSE BLDC GLUCOMTR-MCNC: 51 MG/DL (ref 70–99)
GLUCOSE BLDC GLUCOMTR-MCNC: 66 MG/DL (ref 70–99)

## 2023-05-12 PROCEDURE — 99232 SBSQ HOSP IP/OBS MODERATE 35: CPT | Performed by: INTERNAL MEDICINE

## 2023-05-12 PROCEDURE — 63710000001 INSULIN LISPRO (HUMAN) PER 5 UNITS: Performed by: INTERNAL MEDICINE

## 2023-05-12 PROCEDURE — 82948 REAGENT STRIP/BLOOD GLUCOSE: CPT

## 2023-05-12 PROCEDURE — 63710000001 INSULIN DETEMIR PER 5 UNITS: Performed by: INTERNAL MEDICINE

## 2023-05-12 RX ORDER — INSULIN LISPRO 100 [IU]/ML
7 INJECTION, SOLUTION INTRAVENOUS; SUBCUTANEOUS
Status: DISCONTINUED | OUTPATIENT
Start: 2023-05-12 | End: 2023-05-13 | Stop reason: HOSPADM

## 2023-05-12 RX ADMIN — METOPROLOL TARTRATE 12.5 MG: 25 TABLET, FILM COATED ORAL at 08:54

## 2023-05-12 RX ADMIN — ASPIRIN 81 MG: 81 TABLET, COATED ORAL at 08:54

## 2023-05-12 RX ADMIN — LORAZEPAM 0.5 MG: 0.5 TABLET ORAL at 13:13

## 2023-05-12 RX ADMIN — INSULIN LISPRO 7 UNITS: 100 INJECTION, SOLUTION INTRAVENOUS; SUBCUTANEOUS at 16:53

## 2023-05-12 RX ADMIN — LORAZEPAM 0.5 MG: 0.5 TABLET ORAL at 20:50

## 2023-05-12 RX ADMIN — ACETAMINOPHEN 650 MG: 325 TABLET ORAL at 08:59

## 2023-05-12 RX ADMIN — INSULIN LISPRO 10 UNITS: 100 INJECTION, SOLUTION INTRAVENOUS; SUBCUTANEOUS at 07:42

## 2023-05-12 RX ADMIN — Medication 100 MG: at 08:53

## 2023-05-12 RX ADMIN — NICOTINE 1 PATCH: 21 PATCH, EXTENDED RELEASE TRANSDERMAL at 08:53

## 2023-05-12 RX ADMIN — CETIRIZINE HYDROCHLORIDE 10 MG: 10 TABLET, FILM COATED ORAL at 20:40

## 2023-05-12 RX ADMIN — TICAGRELOR 90 MG: 90 TABLET ORAL at 08:54

## 2023-05-12 RX ADMIN — METOPROLOL TARTRATE 12.5 MG: 25 TABLET, FILM COATED ORAL at 20:40

## 2023-05-12 RX ADMIN — INSULIN LISPRO 5 UNITS: 100 INJECTION, SOLUTION INTRAVENOUS; SUBCUTANEOUS at 11:48

## 2023-05-12 RX ADMIN — ARIPIPRAZOLE 15 MG: 15 TABLET ORAL at 08:54

## 2023-05-12 RX ADMIN — INSULIN LISPRO 3 UNITS: 100 INJECTION, SOLUTION INTRAVENOUS; SUBCUTANEOUS at 16:53

## 2023-05-12 RX ADMIN — INSULIN DETEMIR 25 UNITS: 100 INJECTION, SOLUTION SUBCUTANEOUS at 08:53

## 2023-05-12 RX ADMIN — INSULIN LISPRO 5 UNITS: 100 INJECTION, SOLUTION INTRAVENOUS; SUBCUTANEOUS at 07:42

## 2023-05-12 RX ADMIN — INSULIN LISPRO 7 UNITS: 100 INJECTION, SOLUTION INTRAVENOUS; SUBCUTANEOUS at 11:47

## 2023-05-12 RX ADMIN — LORAZEPAM 0.5 MG: 0.5 TABLET ORAL at 05:07

## 2023-05-12 RX ADMIN — Medication 1 MG: at 08:53

## 2023-05-12 RX ADMIN — TICAGRELOR 90 MG: 90 TABLET ORAL at 20:40

## 2023-05-12 RX ADMIN — VILAZODONE HYDROCHLORIDE 20 MG: 20 TABLET ORAL at 08:54

## 2023-05-12 NOTE — PROGRESS NOTES
"Chief Complaint  Diabetes (Follow up, med mgt, labs in chart,  wants to talk about going back on pump or something to help control her blood glucoses )    Referred By: ARACELI Mccormack          Xenajen Briseno presents to [facility] for diabetes medication management    History of Present Illness    Visit type:  follow-up  Diabetes type:  Type 1  Current diabetes status/concerns/issues: She is concerned about getting on the right medication with the right device to help her better control her glucose levels.  She admits to be very anxious about managing her diabetes.  She is fearful that she or forget how to take her insulin or how much to take.  Other health concerns: She was recently [facility] in the [facility]\" warrant due to bizarre behavior with psychosis and suicidal ideation.  She responded well to treatment.  She was in the hospital from 5/4/2023 through 5/13/2023.  The record was reviewed from this hospitalization.  Current Diabetes symptoms:    Polyuria: No   Polydipsia: No   Polyphagia: No   Blurred vision: No   Excessive fatigue: No   Known Diabetes complications:  Neuro: None  Renal: None  Eyes: None  Amputation/Wounds: None  GI: gas and bloating and irregular bowel movements  Cardiovascular: HTN and hyperlipidemia  ED: N/A  Other: None  Known Diabetes complications:  Neuropathy: None; Location: N/A  Renal: None  Eyes: None; Location: N/A  Amputation/Wounds: None  GI: Bloating and Intestinal Gas  Cardiovascular: Hypertension and Hyperlipidemia  ED: N/A  Other: None  Hypoglycemia:  None reported at this time  Hypoglycemia Symptoms:  No hypoglycemia at this time  Current diabetes treatment:  She was discharged on Lantus 20 units once a day in the morning.  She is also ordered to take 3 units of Novolog before meals.   Blood glucose device:  Meter  Blood glucose monitoring frequency:  2 -3  Blood glucose range/average: Glucose " levels are consistently above 200 and sometimes in the 300s.  There is an occasional glucose level below 200     Glucose Source: Device Reviewed  Diet:  Limits high carb/sweet foods, Avoids sugary drinks  Activity/Exercise:  None    Past Medical History:   Diagnosis Date   • Anxiety    • Arthritis    • Asthma    • CAD (coronary artery disease)    • Depression    • Diabetes mellitus type I    • GERD (gastroesophageal reflux disease)    • Hyperlipidemia    • Hypertension    • Sleep apnea      Past Surgical History:   Procedure Laterality Date   • CARDIAC CATHETERIZATION N/A 2023    Procedure: Left Heart Cath;  Surgeon: Santosh Tran MD;  Location:  [facility];  Service: Cardiology;  Laterality: N/A;   • CARDIAC CATHETERIZATION N/A 2023    Procedure: Coronary angiography;  Surgeon: Santosh Tran MD;  Location:  [facility];  Service: Cardiology;  Laterality: N/A;   • CARDIAC CATHETERIZATION N/A 2023    Procedure: Percutaneous Coronary Intervention;  Surgeon: Santosh Tran MD;  Location:  [facility];  Service: Cardiology;  Laterality: N/A;   • CARDIAC CATHETERIZATION N/A 2023    Procedure: Stent JALIL coronary;  Surgeon: Santosh Tran MD;  Location:  [facility];  Service: Cardiology;  Laterality: N/A;   •  SECTION     • CHOLECYSTECTOMY     • ENDOSCOPY     • SPINE SURGERY       Family History   Problem Relation Age of Onset   • Heart disease Mother    • Parkinsonism Mother    • Diabetes Father    • Depression Father    • Diabetes Brother    • Stroke Paternal Grandmother      Social History     Socioeconomic History   • Marital status:    • Number of children: 2   Tobacco Use   • Smoking status: Every Day   • Smokeless tobacco: Never   Vaping Use   • Vaping Use: Never used   Substance and Sexual Activity   • Alcohol use: Not Currently   • Drug use: Never   • Sexual activity: Defer     Allergies   Allergen Reactions   •  Doxycycline Swelling and Anaphylaxis   • Statins Angioedema       Current Outpatient Medications:   •  albuterol sulfate  (90 Base) MCG/ACT inhaler, Inhale 1 puff Every 4 (Four) Hours As Needed for Wheezing or Shortness of Air. May substitute per formulary  Indications: Asthma, Disp: 8.5 g, Rfl: 0  •  ARIPiprazole (ABILIFY) 15 MG tablet, Take 1 tablet by mouth Daily. Indications: Major Depressive Disorder, Disp: 30 tablet, Rfl: 2  •  aspirin 81 MG EC tablet, Take 1 tablet by mouth Daily. Indications: Stable Angina Pectoris, Disp: 30 tablet, Rfl: 3  •  cetirizine (zyrTEC) 10 MG tablet, Take 1 tablet by mouth Daily. Indications: Hayfever, Disp: 30 tablet, Rfl: 0  •  Continuous Blood Gluc  (FreeStyle Rickey 2 [place]) device, 1 each 4 (Four) Times a Day Before Meals & at Bedtime. Indications: Diabetes, Disp: 1 each, Rfl: 0  •  Continuous Blood Gluc Sensor (FreeStyle Rickey 2 Sensor) misc, 1 each 4 (Four) Times a Day Before Meals & at Bedtime. Dispense appropriate amount  Indications: Diabetes, Disp: 1 each, Rfl: 0  •  fluticasone (FLONASE) 50 MCG/ACT nasal spray, 2 sprays into the nostril(s) as directed by provider Daily. Indications: Allergic Rhinitis, Disp: 16 g, Rfl: 0  •  glucagon (GlucaGen HypoKit) 1 MG injection, Inject 0.5 mg under the skin into the appropriate area as directed 1 (One) Time. Indications: Disorder with Low Blood Sugar, Disp: , Rfl:   •  insulin aspart (NovoLOG FlexPen) 100 UNIT/ML solution pen-injector sc pen, Inject 3 Units under the skin into the appropriate area as directed 3 (Three) Times a Day Before Meals for 30 days, Disp: 15 mL, Rfl: 0  •  Insulin Glargine (BASAGLAR KWIKPEN) 100 UNIT/ML injection pen, Inject 20 Units under the skin into the appropriate area as directed Daily for 30 days., Disp: 15 mL, Rfl: 0  •  Insulin Pen Needle (Pen Needles) 32G X 4 MM misc, Inject 1 each under the skin into the appropriate area as directed 3 (Three) Times a Day Before Meals. Indications:  "Diabetes, Disp: 100 each, Rfl: 0  •  Insulin Syringe 31G X 5/16\" 0.3 ML misc, Inject 1 each under the skin into the appropriate area as directed 3 (Three) Times a Day Before Meals. Indications: Diabetes, Disp: 100 each, Rfl: 0  •  LORazepam (Ativan) 0.5 MG tablet, Take 1 tablet by mouth 2 (Two) Times a Day As Needed for Anxiety. Indications: Feeling Anxious, Disp: 60 tablet, Rfl: 0  •  metoprolol tartrate (LOPRESSOR) 25 MG tablet, Take 0.5 tablets by mouth Every 12 (Twelve) Hours. Indications: Angina Pectoris, Disp: 60 tablet, Rfl: 0  •  ticagrelor (BRILINTA) 90 MG tablet tablet, Take 1 tablet by mouth 2 (Two) Times a Day. Indications: Percutaneous Coronary Intervention, Disp: 60 tablet, Rfl: 0  •  tiotropium bromide monohydrate (Spiriva Respimat) 2.5 MCG/ACT aerosol solution inhaler, Inhale 2 puffs Daily. Indications: Chronic Obstructive Lung Disease, Disp: 4 g, Rfl: 0  •  vilazodone (VIIBRYD) 20 MG tablet tablet, Take 1 tablet by mouth Daily. Indications: Major Depressive Disorder, Disp: 30 tablet, Rfl: 2  •  montelukast (SINGULAIR) 10 MG tablet, Take 1 tablet by mouth Every Night. Indications: Hayfever (Patient not taking: Reported on 5/15/2023), Disp: 30 tablet, Rfl: 0    Objective     Vitals:    05/15/23 1032   BP: 94/53   BP Location: Left arm   Patient Position: Sitting   Cuff Size: Adult   Pulse: 76   Temp: 97.2 °F (36.2 °C)   SpO2: 100%   Weight: 61.7 kg (136 lb)   Height: 165.1 cm (65\")   PainSc:   7     Body mass index is 22.63 kg/m².    Physical Exam  Constitutional:       Appearance: Normal appearance. She is normal weight.   HENT:      Head: Normocephalic and atraumatic.      Right Ear: External ear normal.      Left Ear: External ear normal.      Nose: Nose normal.   Eyes:      Extraocular Movements: Extraocular movements intact.      Conjunctiva/sclera: Conjunctivae normal.   Pulmonary:      Effort: Pulmonary effort is normal.   Musculoskeletal:         General: Normal range of motion.      Cervical " back: Normal range of motion.   Skin:     General: Skin is warm and dry.   Neurological:      General: No focal deficit present.      Mental Status: She is alert and oriented to person, place, and time. Mental status is at baseline.   Psychiatric:      Comments: She appears very anxious and somewhat timid and giving responses.  Her  tries to answer questions before the patient has an opportunity to do so herself.             Result Review :   The following data was reviewed by: ARACELI Sotomayor on 05/15/2023:    Most Recent A1C        5/4/2023    19:27   HGBA1C Most Recent   Hemoglobin A1C 11.20         A1C Last 3 Results        1/13/2023    15:10 1/14/2023    05:03 5/4/2023    19:27   HGBA1C Last 3 Results   Hemoglobin A1C 9.2   9.80   11.20       The most recent A1c was collected on 5/4/2023 and was 11.2% indicating uncontrolled type 1 diabetes.  This was up from the prior result of 9.8 collected in January of this year.    Glucose   Date Value Ref Range Status   05/15/2023 171 (H) 70 - 99 mg/dL Final     Comment:     Serial Number: [id number]:  [id number]     Point-of-care glucose in the office today is within normal limits for nonfasting glucose    Creatinine   Date Value Ref Range Status   05/13/2023 0.47 (L) 0.57 - 1.00 mg/dL Final   05/10/2023 0.41 (L) 0.57 - 1.00 mg/dL Final     eGFR   Date Value Ref Range Status   05/13/2023 117.6 >60.0 mL/min/1.73 Final   05/10/2023 121.5 >60.0 mL/min/1.73 Final     Labs collected on 5/13/2023 show normal renal function    Total Cholesterol   Date Value Ref Range Status   05/04/2023 169 0 - 200 mg/dL Final   01/17/2023 167 0 - 200 mg/dL Final     Triglycerides   Date Value Ref Range Status   05/04/2023 117 0 - 150 mg/dL Final   01/17/2023 94 0 - 150 mg/dL Final     HDL Cholesterol   Date Value Ref Range Status   05/04/2023 46 40 - 60 mg/dL Final   01/17/2023 38 (L) 40 - 60 mg/dL Final     LDL Cholesterol    Date Value Ref Range Status   05/04/2023 102  (H) 0 - 100 mg/dL Final   01/17/2023 111 (H) 0 - 100 mg/dL Final     Lipid panel collected on 5/4/2023 shows hypercholesterolemia            Assessment: Patient's glucose levels are significantly elevated.  She is very anxious about getting her diabetes under better control.  She brought her insulin pump with her to the appointment today and does express interest and possibly going back onto this device if it is the best option for her.  She is anxious that she may forget to take her insulin if she is on multiple daily injections and she is also anxious that she may forget how to use the insulin pump.  She is timid in her responses today.  Her  is accompanying her on the visit today.  He often interjects when questions are asked not allowing the patient to respond.  This provider did have a few minutes of private time with the patient without the  present.  She denies any concerns regarding abuse or problems with her home life at this time.      Diagnoses and all orders for this visit:    1. Uncontrolled type 1 diabetes mellitus with hyperglycemia (Primary)    2. Type 1 diabetes mellitus with diabetic neuropathy    3. Anxiety    Other orders  -     POC Glucose        Plan: We will restart the patient back on her insulin pump once supplies are reordered.  We will contact the supplier to have her supplies renewed.  Upon receipt of her supplies she will call the office and we will schedule an appointment with the diabetes nurse educator for training refresher on use of the device.  We will also schedule her to be seen by the dietitian to focus on carbohydrate counting for pump use.  In the meantime the patient was instructed to increase her Lantus to 25 units once a day and her NovoLog to 5 units 3 times a day with meals.  A written copy of these instructions were reviewed and provided for the patient.  The patient was prescribed a vu continuous glucose sensor upon discharge from the hospital but has  yet to receive the device.  She is encouraged to start to the device is soon as possible.    The patient will monitor her blood glucose levels using her continuous glucose sensor.  If she develops problematic hyperglycemia or hypoglycemia or adverse drug reactions, she will contact the office for further instructions.        Follow Up     Return in about 8 weeks (around 7/10/2023) for Pump Eval, CGM Follow-up.    Patient was given instructions and counseling regarding her condition or for health maintenance advice. Please see specific information pulled into the AVS if appropriate.     Jaquelin Grover, APRN  05/15/2023      Dictated Utilizing Dragon Dictation.  Please note that portions of this note were completed with a voice recognition program.  Part of this note may be an electronic transcription/translation of spoken language to printed text using the Dragon Dictation System.
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
## 2023-05-12 NOTE — CONSULTS
"Nutrition Services    Patient Name: Xena Briseno  YOB: 1975  MRN: [id number]  Admission date: 5/4/2023      CLINICAL NUTRITION ASSESSMENT      Reason for Assessment  Follow-up protocol   H&P:    Past Medical History:   Diagnosis Date   • Anxiety    • Arthritis    • Asthma    • CAD (coronary artery disease)    • Depression    • Diabetes mellitus type I    • GERD (gastroesophageal reflux disease)    • Hyperlipidemia    • Hypertension    • Sleep apnea         Current Problems:   Active Hospital Problems    Diagnosis    • **Major depression    • Major depressive disorder, recurrent episode, moderate    • Gastroesophageal reflux disease without esophagitis    • Type 1 diabetes mellitus    • Bipolar 2 disorder    • Hypertensive disorder    • Hyperlipidemia    • Seasonal allergies         Nutrition/Diet History         Narrative     Nutrition follow up. Pt continues w/ 100% meal intake. No acute nutrition concerns or interventions at this time. RD will continue to monitor per protocol.      Anthropometrics        Current Height, Weight Height: 165.1 cm (65\")  Weight: 59 kg (130 lb)   Current BMI Body mass index is 21.63 kg/m².       Weight Hx  Wt Readings from Last 30 Encounters:   05/04/23 2000 59 kg (130 lb)   02/10/23 1319 61.2 kg (135 lb)   01/18/23 0922 62.6 kg (138 lb)   01/17/23 0353 62.3 kg (137 lb 4.8 oz)   01/16/23 0525 61.8 kg (136 lb 3.9 oz)   01/15/23 2031 61.6 kg (135 lb 12.8 oz)   01/15/23 1203 62.1 kg (137 lb)   01/14/23 0928 62.3 kg (137 lb 5.6 oz)   01/13/23 1449 59.4 kg (131 lb)   07/29/22 1445 74.8 kg (165 lb)   05/13/22 1608 69.9 kg (154 lb)   04/20/22 1400 70.9 kg (156 lb 4.9 oz)   01/31/22 1143 72.2 kg (159 lb 2.8 oz)            Wt Change Observation Stable x 4 mo, -15.6% x 1 yr--clinically insignificant      Estimated/Assessed Needs       Energy Requirements 25-30 kcal/kg CBW   EST Needs (kcal/day) 5600-8110       Protein Requirements 1.0 g/kg   EST Daily Needs (g/day) 59     "   Fluid Requirements 1 ml/kcal    Estimated Needs (mL/day) 2314-0799     Labs/Medications         Pertinent Labs Reviewed.   Results from last 7 days   Lab Units 05/10/23  0533   SODIUM mmol/L 140   POTASSIUM mmol/L 4.1   CHLORIDE mmol/L 104   CO2 mmol/L 29.4*   BUN mg/dL 10   CREATININE mg/dL 0.41*   CALCIUM mg/dL 8.6   GLUCOSE mg/dL 77     Results from last 7 days   Lab Units 05/10/23  0533   MAGNESIUM mg/dL 2.0   HEMOGLOBIN g/dL 11.6*   HEMATOCRIT % 33.9*     No results found for: COVID19  Lab Results   Component Value Date    HGBA1C 11.20 (H) 05/04/2023         Pertinent Medications Reviewed.     Current Nutrition Orders & Evaluation of Intake       Oral Nutrition     Current PO Diet Diet: Diabetic Diets; Consistent Carbohydrate; Safe Tray; Texture: Regular Texture (IDDSI 7); Fluid Consistency: Thin (IDDSI 0)   Supplement No active supplement orders       Malnutrition Severity Assessment                Nutrition Diagnosis         Nutrition Dx Problem 1 No nutrition diagnosis at this time        Nutrition Intervention         No nutrition intervention at this time      Medical Nutrition Therapy/Nutrition Education          Learner     Readiness Patient  Education not indicated at this time     Method     Response N/A  N/A     Monitor/Evaluation        Monitor Per protocol, PO intake, Weight, POC/GOC     Nutrition Discharge Plan         No nutrition discharge needs identified at this time     Electronically signed by:  Zoya Alicea RD  05/12/23 09:48 EDT
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
## 2023-05-12 NOTE — PLAN OF CARE
"Goal Outcome Evaluation:  Plan of Care Reviewed With: patient  Patient Agreement with Plan of Care: agrees     Progress: improving          Patient rating anxiety 6/10 and depression 7/10. Denies HI/SI. Denies AVH. Patient remains calm and cooperative. Had visit with  this shift that patient states went \"alright.\" Patient  brought in patient's home insulin pump. Insulin pump stored at nurses station with patient belongings.  Patient compliant with scheduled night time medication. Patient required reeducation on appropriate snacks for a diabetic patient. Patient able to make needs known. Will continue plan of care and provide a safe patient environment.    BG Checks  2038- 106  2300- 355    2304 On-call Sheeba RAY Notified of elevated BG. See orders. 5 units Humalog administered per MAR. Orders to recheck BG at 0100.    0100- 186  0430- 265  0600- 332    0605 On call Sheeba RAY Notified of elevated BG. No new orders.    "

## 2023-05-12 NOTE — PLAN OF CARE
Goal Outcome Evaluation:  Plan of Care Reviewed With: patient  Patient Agreement with Plan of Care: agrees  PATIENT ALERT AND ORIENTED AND CALM AND COOPERATIVE WITH STAFF. PATIENT COMPLIANT WITH ALL MEDICATIONS. PATIENT DENIES S/I, H/I OR HALLUCINATIONS. PATIENT CONTINUES TO COMPLAIN OF ANXIETY RELATED TO MANAGING HER DIABETES WHEN DISCHARGED. NO INAPPROPRIATE OR AGGRESSIVE BEHAVIOR NOTED. WILL CONTINUE TO MONITOR FOR CHANGES IN MOOD OR CONDITION.

## 2023-05-12 NOTE — CASE MANAGEMENT/SOCIAL WORK
Attempts to make contact with Christ or Kyleigh Bryant to discuss patient support upon discharge. LVM with staff contact number.

## 2023-05-12 NOTE — CONSULTS
"Follow-up with patient to inform her that FreeStyle Rickey 2 sensors are covered by her insurance, as is the FreeStyle Rickey 2 reader. Patient appreciative and states \"the sensor will give me some peace.\"  "

## 2023-05-12 NOTE — SIGNIFICANT NOTE
05/12/23 1526   Plan   Plan Discussed placment with patient. Discussed [facility], [facility] home, or home. Pt would like to return home.   Patient/Family in Agreement with Plan yes  (Discussed placement with patient in detail. Assisted and educated pt regarding discharge to [facility]. Patient is choosing to discharge home.)   Provided Post Acute Provider List? Yes   Post Acute Provider List Outpatient Therapy  (Patient provided with after care discharge information for diabetes care, behavioral health, and [facility] , [place])   Delivered To Patient   Method of Delivery In person   Final Discharge Disposition Code 01 - home or self-care   Final Note Patient will discharge home, she will follow up with [facility], [facility]
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
## 2023-05-13 VITALS
HEIGHT: 65 IN | OXYGEN SATURATION: 100 % | SYSTOLIC BLOOD PRESSURE: 96 MMHG | DIASTOLIC BLOOD PRESSURE: 62 MMHG | WEIGHT: 130 LBS | BODY MASS INDEX: 21.66 KG/M2 | HEART RATE: 75 BPM | TEMPERATURE: 99.5 F | RESPIRATION RATE: 16 BRPM

## 2023-05-13 LAB
ANION GAP SERPL CALCULATED.3IONS-SCNC: 7 MMOL/L (ref 5–15)
BASOPHILS # BLD AUTO: 0.09 10*3/MM3 (ref 0–0.2)
BASOPHILS NFR BLD AUTO: 1.2 % (ref 0–1.5)
BUN SERPL-MCNC: 9 MG/DL (ref 6–20)
BUN/CREAT SERPL: 19.1 (ref 7–25)
CALCIUM SPEC-SCNC: 8.5 MG/DL (ref 8.6–10.5)
CHLORIDE SERPL-SCNC: 108 MMOL/L (ref 98–107)
CO2 SERPL-SCNC: 26 MMOL/L (ref 22–29)
CREAT SERPL-MCNC: 0.47 MG/DL (ref 0.57–1)
DEPRECATED RDW RBC AUTO: 48.3 FL (ref 37–54)
EGFRCR SERPLBLD CKD-EPI 2021: 117.6 ML/MIN/1.73
EOSINOPHIL # BLD AUTO: 0.16 10*3/MM3 (ref 0–0.4)
EOSINOPHIL NFR BLD AUTO: 2.2 % (ref 0.3–6.2)
ERYTHROCYTE [DISTWIDTH] IN BLOOD BY AUTOMATED COUNT: 13.4 % (ref 12.3–15.4)
GLUCOSE BLDC GLUCOMTR-MCNC: 155 MG/DL (ref 70–99)
GLUCOSE BLDC GLUCOMTR-MCNC: 186 MG/DL (ref 70–99)
GLUCOSE SERPL-MCNC: 119 MG/DL (ref 65–99)
HCT VFR BLD AUTO: 33.4 % (ref 34–46.6)
HGB BLD-MCNC: 11.2 G/DL (ref 12–15.9)
IMM GRANULOCYTES # BLD AUTO: 0.12 10*3/MM3 (ref 0–0.05)
IMM GRANULOCYTES NFR BLD AUTO: 1.6 % (ref 0–0.5)
LYMPHOCYTES # BLD AUTO: 3.06 10*3/MM3 (ref 0.7–3.1)
LYMPHOCYTES NFR BLD AUTO: 41.6 % (ref 19.6–45.3)
MAGNESIUM SERPL-MCNC: 1.9 MG/DL (ref 1.6–2.6)
MCH RBC QN AUTO: 33.3 PG (ref 26.6–33)
MCHC RBC AUTO-ENTMCNC: 33.5 G/DL (ref 31.5–35.7)
MCV RBC AUTO: 99.4 FL (ref 79–97)
MONOCYTES # BLD AUTO: 0.71 10*3/MM3 (ref 0.1–0.9)
MONOCYTES NFR BLD AUTO: 9.7 % (ref 5–12)
NEUTROPHILS NFR BLD AUTO: 3.21 10*3/MM3 (ref 1.7–7)
NEUTROPHILS NFR BLD AUTO: 43.7 % (ref 42.7–76)
NRBC BLD AUTO-RTO: 0 /100 WBC (ref 0–0.2)
PHOSPHATE SERPL-MCNC: 4.3 MG/DL (ref 2.5–4.5)
PLATELET # BLD AUTO: 517 10*3/MM3 (ref 140–450)
PMV BLD AUTO: 9.5 FL (ref 6–12)
POTASSIUM SERPL-SCNC: 4.4 MMOL/L (ref 3.5–5.2)
RBC # BLD AUTO: 3.36 10*6/MM3 (ref 3.77–5.28)
SODIUM SERPL-SCNC: 141 MMOL/L (ref 136–145)
WBC NRBC COR # BLD: 7.35 10*3/MM3 (ref 3.4–10.8)

## 2023-05-13 PROCEDURE — 82948 REAGENT STRIP/BLOOD GLUCOSE: CPT

## 2023-05-13 PROCEDURE — 63710000001 INSULIN DETEMIR PER 5 UNITS: Performed by: INTERNAL MEDICINE

## 2023-05-13 PROCEDURE — 63710000001 INSULIN LISPRO (HUMAN) PER 5 UNITS: Performed by: INTERNAL MEDICINE

## 2023-05-13 PROCEDURE — 99233 SBSQ HOSP IP/OBS HIGH 50: CPT | Performed by: INTERNAL MEDICINE

## 2023-05-13 PROCEDURE — 85025 COMPLETE CBC W/AUTO DIFF WBC: CPT | Performed by: INTERNAL MEDICINE

## 2023-05-13 PROCEDURE — 83735 ASSAY OF MAGNESIUM: CPT | Performed by: INTERNAL MEDICINE

## 2023-05-13 PROCEDURE — 80048 BASIC METABOLIC PNL TOTAL CA: CPT | Performed by: INTERNAL MEDICINE

## 2023-05-13 PROCEDURE — 84100 ASSAY OF PHOSPHORUS: CPT | Performed by: INTERNAL MEDICINE

## 2023-05-13 RX ORDER — CETIRIZINE HYDROCHLORIDE 10 MG/1
10 TABLET ORAL DAILY
Qty: 30 TABLET | Refills: 0 | Status: SHIPPED | OUTPATIENT
Start: 2023-05-13

## 2023-05-13 RX ORDER — VILAZODONE HYDROCHLORIDE 20 MG/1
20 TABLET ORAL DAILY
Qty: 30 TABLET | Refills: 2 | Status: SHIPPED | OUTPATIENT
Start: 2023-05-14

## 2023-05-13 RX ORDER — ALBUTEROL SULFATE 90 UG/1
1 AEROSOL, METERED RESPIRATORY (INHALATION) EVERY 4 HOURS PRN
Qty: 8.5 G | Refills: 0 | Status: SHIPPED | OUTPATIENT
Start: 2023-05-13

## 2023-05-13 RX ORDER — CALCIUM CARB/VITAMIN D3/VIT K1 500-100-40
1 TABLET,CHEWABLE ORAL
Qty: 100 EACH | Refills: 0 | Status: SHIPPED | OUTPATIENT
Start: 2023-05-13

## 2023-05-13 RX ORDER — TIOTROPIUM BROMIDE INHALATION SPRAY 3.12 UG/1
2 SPRAY, METERED RESPIRATORY (INHALATION)
Qty: 4 G | Refills: 0 | Status: SHIPPED | OUTPATIENT
Start: 2023-05-13

## 2023-05-13 RX ORDER — ARIPIPRAZOLE 15 MG/1
15 TABLET ORAL DAILY
Qty: 30 TABLET | Refills: 2 | Status: SHIPPED | OUTPATIENT
Start: 2023-05-14

## 2023-05-13 RX ORDER — INSULIN GLARGINE 100 [IU]/ML
20 INJECTION, SOLUTION SUBCUTANEOUS DAILY
Qty: 15 ML | Refills: 0 | Status: SHIPPED | OUTPATIENT
Start: 2023-05-13 | End: 2023-07-27

## 2023-05-13 RX ORDER — PEN NEEDLE, DIABETIC 30 GX3/16"
1 NEEDLE, DISPOSABLE MISCELLANEOUS
Qty: 100 EACH | Refills: 0 | Status: SHIPPED | OUTPATIENT
Start: 2023-05-13

## 2023-05-13 RX ORDER — MONTELUKAST SODIUM 10 MG/1
10 TABLET ORAL NIGHTLY
Qty: 30 TABLET | Refills: 0 | Status: SHIPPED | OUTPATIENT
Start: 2023-05-13

## 2023-05-13 RX ORDER — INSULIN ASPART 100 [IU]/ML
3 INJECTION, SOLUTION INTRAVENOUS; SUBCUTANEOUS
Qty: 15 ML | Refills: 0 | Status: SHIPPED | OUTPATIENT
Start: 2023-05-13 | End: 2023-08-11

## 2023-05-13 RX ORDER — INSULIN LISPRO 100 [IU]/ML
3-14 INJECTION, SOLUTION INTRAVENOUS; SUBCUTANEOUS
Qty: 1260 UNITS | Refills: 0 | Status: CANCELLED | OUTPATIENT
Start: 2023-05-13 | End: 2023-06-12

## 2023-05-13 RX ORDER — FLUTICASONE PROPIONATE 50 MCG
2 SPRAY, SUSPENSION (ML) NASAL DAILY
Qty: 16 G | Refills: 0 | Status: SHIPPED | OUTPATIENT
Start: 2023-05-13

## 2023-05-13 RX ADMIN — Medication 100 MG: at 08:21

## 2023-05-13 RX ADMIN — INSULIN LISPRO 7 UNITS: 100 INJECTION, SOLUTION INTRAVENOUS; SUBCUTANEOUS at 11:24

## 2023-05-13 RX ADMIN — ASPIRIN 81 MG: 81 TABLET, COATED ORAL at 08:20

## 2023-05-13 RX ADMIN — INSULIN LISPRO 7 UNITS: 100 INJECTION, SOLUTION INTRAVENOUS; SUBCUTANEOUS at 07:34

## 2023-05-13 RX ADMIN — METOPROLOL TARTRATE 12.5 MG: 25 TABLET, FILM COATED ORAL at 08:21

## 2023-05-13 RX ADMIN — INSULIN LISPRO 3 UNITS: 100 INJECTION, SOLUTION INTRAVENOUS; SUBCUTANEOUS at 07:35

## 2023-05-13 RX ADMIN — ACETAMINOPHEN 650 MG: 325 TABLET ORAL at 10:01

## 2023-05-13 RX ADMIN — INSULIN DETEMIR 25 UNITS: 100 INJECTION, SOLUTION SUBCUTANEOUS at 08:21

## 2023-05-13 RX ADMIN — NICOTINE 1 PATCH: 21 PATCH, EXTENDED RELEASE TRANSDERMAL at 09:00

## 2023-05-13 RX ADMIN — ARIPIPRAZOLE 15 MG: 15 TABLET ORAL at 08:20

## 2023-05-13 RX ADMIN — LORAZEPAM 0.5 MG: 0.5 TABLET ORAL at 07:23

## 2023-05-13 RX ADMIN — VILAZODONE HYDROCHLORIDE 20 MG: 20 TABLET ORAL at 08:20

## 2023-05-13 RX ADMIN — TICAGRELOR 90 MG: 90 TABLET ORAL at 08:20

## 2023-05-13 RX ADMIN — Medication 1 MG: at 08:21

## 2023-05-13 RX ADMIN — INSULIN LISPRO 3 UNITS: 100 INJECTION, SOLUTION INTRAVENOUS; SUBCUTANEOUS at 11:24

## 2023-05-13 NOTE — PLAN OF CARE
Goal Outcome Evaluation:  Plan of Care Reviewed With: patient  Patient Agreement with Plan of Care: agrees   PATIENT HAS REACHED ALL GOALS AND WILL BE DISCHARGED FROM [facility]. PATIENT TO CONTINUE TREATMENT ON OUTPATIENT BASIS.

## 2023-05-13 NOTE — PROGRESS NOTES
[facility]   Hospitalist Progress Note  Date: 2023  Patient Name: Xena Briseno  : 1975  MRN: [id number]  Date of admission: 2023    Subjective   Subjective     Reason for consult: Management of type 1 diabetes mellitus    Interval Followup:   Overnight had episode of hypoglycemia and hypotension which resolved now.  Feeling well today, hoping to go home.  Initially was interested in going to a women's shelter but no longer thinks she wants to pursue this.    Objective   Objective     Vitals:   Temp:  [97.7 °F (36.5 °C)-99.5 °F (37.5 °C)] 99.5 °F (37.5 °C)  Heart Rate:  [68-75] 75  Resp:  [16-18] 16  BP: ()/(59-63) 96/62  Physical Exam   Gen: NAD, appears comfortable  Resp: CTAB, No w/r/r, no respiratory distress appreciated  Card: RRR, No MRG   Abd: Soft, Nontender, Nondistended, + bowel sounds    Result Review    Result Review:  I have personally reviewed the results as below and agree with these findings:  []  Laboratory: Personally reviewed BMP, blood sugars, magnesium level, CBC  CMP        2023    19:27 5/10/2023    05:33 2023    04:23   CMP   Glucose 380   77   119     BUN 10   10   9     Creatinine 0.69   0.41   0.47     EGFR 107.2   121.5   117.6     Sodium 136   140   141     Potassium 4.6   4.1   4.4     Chloride 99   104   108     Calcium 8.9   8.6   8.5     Total Protein 5.8       Albumin 3.3       Globulin 2.5       Total Bilirubin 0.3       Alkaline Phosphatase 104       AST (SGOT) 39       ALT (SGPT) 33       Albumin/Globulin Ratio 1.3       BUN/Creatinine Ratio 14.5   24.4   19.1     Anion Gap 13.4   6.6   7.0       CBC        2023    19:27 5/10/2023    05:33 2023    04:22   CBC   WBC 10.60   6.92   7.35     RBC 3.61   3.49   3.36     Hemoglobin 12.4   11.6   11.2     Hematocrit 35.6   33.9   33.4     MCV 98.6   97.1   99.4     MCH 34.3   33.2   33.3     MCHC 34.8   34.2   33.5     RDW 12.8   12.7   13.4     Platelets 128   413   517          []   Microbiology:   []  Radiology:   []  EKG/Telemetry:    []  Cardiology/Vascular:    []  Pathology:  []  Old records:  []  Other:    Assessment & Plan   Assessment / Plan     Assessment/plan:  Hypoglycemia  Type 1 diabetes mellitus with hyperglycemia, A1c 11.2  Medical noncompliance  Hypoalbuminemia leading to lower extremity edema  Hypertension  Hyperlipidemia  Anxiety/depression  Coronary artery disease with drug-eluting stent in January 2023    Discussed at length with the patient prior to discharge  She has brittle diabetes and our goal is to avoid hypoglycemia and allow permissive hyperglycemia for now  At discharge, will start Levemir 20 units daily with scheduled Humalog 3 units 3 times daily with meals.  I recommend she avoid all evening or late insulin as this tends to cause hypoglycemia  I have prescribed 1 months worth of insulin for her  I have prescribed freestyle vu sensor and reader  She also requested I prescribe 1 month of her home medications that she does not have these.  I have filled 1 months worth of Brilinta, aspirin, metoprolol, Flonase, Zyrtec, albuterol  She will follow-up with endocrinology within 1-2 weeks  Recommend she continue to follow-up with her primary care physician within 1 week  Plan to discharge this afternoon    Discussed case with: Bedside RN, psychiatry    Total of 53 minutes spent reviewing labs and imaging, counseling and educating the patient and family, ordering medications, discussing with specialty services, documenting clinical information in the electronic medical record, and care coordination.    CODE STATUS:   Code Status (Patient has no pulse and is not breathing): CPR (Attempt to Resuscitate)  Medical Interventions (Patient has pulse or is breathing): Full Support  Release to patient: Routine Release

## 2023-05-13 NOTE — PLAN OF CARE
Goal Outcome Evaluation:  Plan of Care Reviewed With: patient    Pt is alert and cooperative with assessment. Pt denies current SI, HI or AVH. Pt rates her depression and anxiety 10/10. Pt BS was checked before snack time and it was 51; pt was given turkey sandwich, chips and apple juice. Pt was rechecked and it was 66; pt was given peanut butter crackers, banana and apple juice. At re-check patient BS was 103. Pt was concerned about her BS and asked staff to check during night. Pt was rechecked at 2330 and it was 225. Pt was also found to be hypotensive during med pass. Dr. Meyers was contacted regarding whether or not to hold metoprolol for /59 HR 68. Per MD, okay to administer medication. Dr. Meyers was also advised of hypoglycemia and treatment. Pt did attend and participate in group. Will continue to monitor this patient and provide a safe environment. -- AS RN
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
## 2023-05-14 RX ORDER — LORAZEPAM 0.5 MG/1
0.5 TABLET ORAL 2 TIMES DAILY PRN
Qty: 60 TABLET | Refills: 0 | Status: SHIPPED | OUTPATIENT
Start: 2023-05-14 | End: 2024-05-13

## 2023-05-15 ENCOUNTER — OFFICE VISIT (OUTPATIENT)
Dept: DIABETES SERVICES | Facility: HOSPITAL | Age: 48
End: 2023-05-15
Payer: MEDICARE

## 2023-05-15 VITALS
DIASTOLIC BLOOD PRESSURE: 53 MMHG | BODY MASS INDEX: 22.66 KG/M2 | HEIGHT: 65 IN | OXYGEN SATURATION: 100 % | HEART RATE: 76 BPM | SYSTOLIC BLOOD PRESSURE: 94 MMHG | WEIGHT: 136 LBS | TEMPERATURE: 97.2 F

## 2023-05-15 DIAGNOSIS — F41.9 ANXIETY: ICD-10-CM

## 2023-05-15 DIAGNOSIS — E10.65 UNCONTROLLED TYPE 1 DIABETES MELLITUS WITH HYPERGLYCEMIA: Primary | ICD-10-CM

## 2023-05-15 DIAGNOSIS — E10.40 TYPE 1 DIABETES MELLITUS WITH DIABETIC NEUROPATHY: ICD-10-CM

## 2023-05-15 LAB — GLUCOSE BLDC GLUCOMTR-MCNC: 171 MG/DL (ref 70–99)

## 2023-05-15 PROCEDURE — G0463 HOSPITAL OUTPT CLINIC VISIT: HCPCS | Performed by: NURSE PRACTITIONER

## 2023-05-15 PROCEDURE — 1160F RVW MEDS BY RX/DR IN RCRD: CPT | Performed by: NURSE PRACTITIONER

## 2023-05-15 PROCEDURE — 3074F SYST BP LT 130 MM HG: CPT | Performed by: NURSE PRACTITIONER

## 2023-05-15 PROCEDURE — 82948 REAGENT STRIP/BLOOD GLUCOSE: CPT | Performed by: NURSE PRACTITIONER

## 2023-05-15 PROCEDURE — 99214 OFFICE O/P EST MOD 30 MIN: CPT | Performed by: NURSE PRACTITIONER

## 2023-05-15 PROCEDURE — 1159F MED LIST DOCD IN RCRD: CPT | Performed by: NURSE PRACTITIONER

## 2023-05-15 PROCEDURE — 3046F HEMOGLOBIN A1C LEVEL >9.0%: CPT | Performed by: NURSE PRACTITIONER

## 2023-05-15 PROCEDURE — 3078F DIAST BP <80 MM HG: CPT | Performed by: NURSE PRACTITIONER

## 2023-05-15 NOTE — PATIENT INSTRUCTIONS
Increase the Lantus to taking 25 units every morning    Take 5 units of NovoLog before each meal.  If you are not eating the meal you will skip this dose.    Check your blood sugar before each meal and at bedtime    Once you receive your [employer] pump supplies, call the office for training with the nurse

## 2023-05-18 LAB — QT INTERVAL: 370 MS

## 2023-05-18 RX ORDER — LANCETS
EACH MISCELLANEOUS
Qty: 102 EACH | Refills: 0 | Status: SHIPPED | OUTPATIENT
Start: 2023-05-18

## 2023-05-31 ENCOUNTER — TELEPHONE (OUTPATIENT)
Dept: DIABETES SERVICES | Facility: HOSPITAL | Age: 48
End: 2023-05-31
Payer: MEDICARE

## 2023-05-31 NOTE — TELEPHONE ENCOUNTER
Patient called in and left voice message stating that she was looking into getting a pump and would like to look into the omnipod patient stated that she filled out a form on facebook and would like to discuss this with Jaquelin.

## 2023-06-01 ENCOUNTER — TELEPHONE (OUTPATIENT)
Dept: DIABETES SERVICES | Facility: HOSPITAL | Age: 48
End: 2023-06-01

## 2023-06-01 NOTE — TELEPHONE ENCOUNTER
Caller: Xena Briseno    Relationship: Self    Best call back number: [phone]    What is the best time to reach you: MORNING    Who are you requesting to speak with (clinical staff, provider,  specific staff member): CLINICAL OR PROVIDER        What was the call regarding: WOULD LIKE TO GET A OMNIPOD BEFORE July VISIT    Is it okay if the provider responds through [employer]: NO, PLEASE CALL

## 2023-06-01 NOTE — TELEPHONE ENCOUNTER
Tonie Teague RegSched Rep    VE    12:30 PM  Note       Caller: Xena Briseno     Relationship: Self     Best call back number: [phone]     What is the best time to reach you: MORNING     Who are you requesting to speak with (clinical staff, provider,  specific staff member): CLINICAL OR PROVIDER           What was the call regarding: WOULD LIKE TO GET A OMNIPOD BEFORE July VISIT     Is it okay if the provider responds through [employer]: NO, PLEASE CALL

## 2023-06-07 NOTE — TELEPHONE ENCOUNTER
Spoke with patient.   She stated her daughter stated Omnipod would be better for her.  She is not for sure what insurance she had when she received her insulin pump 03/21/22.  I explained she would need to call [employer] and ask when her medtronic insulin pump contract was up.    She stated she wanted to restart her insulin pump.  I scheduled her for 06/16/23 at 10:30. I explained she would need to bring her insulin pump, supplies and insulin with her.   She wanted to know if it was a different insurance if Jaquelin CHARLES would order her the Omnipod.  I explained she would need a visit with Jaquelin Grover first due to insurance requirements.

## 2023-06-13 ENCOUNTER — TELEPHONE (OUTPATIENT)
Dept: DIABETES SERVICES | Facility: HOSPITAL | Age: 48
End: 2023-06-13
Payer: MEDICARE

## 2023-06-13 NOTE — TELEPHONE ENCOUNTER
Provider: DR LULÚ CABRAL   Caller: JEAN REAVES   Relationship to Patient: SELF   Pharmacy: [facility]   Phone Number: [phone]  Reason for Call: PT WOULD LIKE FOR STAFF TO DO A PRIOR AUTHORIZATION FOR AN OMNIPOD INSTEAD OF DOING THE PUMP. PT STATES SHE CAN'T DO THE PUMP BECAUSE OF ALL THE WIRES. PT WOULD LIKE A CALL BACK TO DISCUSS THIS. SHE HAS ALSO MISPLACED SOME OF HER EQUIPMENT TO HER PUMP, BUT FEELS SHE DOESN'T NEED TO GET NEW UNTIL SHE SEES IF SHE CAN GET THE OMNIPOD. PLEASE ADVISE

## 2023-06-16 DIAGNOSIS — E10.65 UNCONTROLLED TYPE 1 DIABETES MELLITUS WITH HYPERGLYCEMIA: Primary | ICD-10-CM

## 2023-06-16 RX ORDER — BLOOD SUGAR DIAGNOSTIC
1 STRIP MISCELLANEOUS 4 TIMES DAILY
COMMUNITY
End: 2023-06-16 | Stop reason: SDUPTHER

## 2023-06-16 RX ORDER — BLOOD SUGAR DIAGNOSTIC
1 STRIP MISCELLANEOUS 4 TIMES DAILY
Qty: 400 EACH | Refills: 1 | Status: SHIPPED | OUTPATIENT
Start: 2023-06-16

## 2023-06-16 RX ORDER — LANCETS
1 EACH MISCELLANEOUS 4 TIMES DAILY
Qty: 306 EACH | Refills: 1 | Status: SHIPPED | OUTPATIENT
Start: 2023-06-16

## 2023-06-16 RX ORDER — INSULIN ASPART 100 [IU]/ML
60 INJECTION, SOLUTION INTRAVENOUS; SUBCUTANEOUS DAILY
COMMUNITY
End: 2023-06-16 | Stop reason: SDUPTHER

## 2023-06-16 RX ORDER — INSULIN ASPART 100 [IU]/ML
60 INJECTION, SOLUTION INTRAVENOUS; SUBCUTANEOUS DAILY
Qty: 54 ML | Refills: 1 | Status: SHIPPED | OUTPATIENT
Start: 2023-06-16

## 2023-06-16 NOTE — TELEPHONE ENCOUNTER
Pt was seen in clinic today by Laine. While here pt requested refills. Refills were sent to pharmacy. I informed pt of this, pt understood and said thank you.

## 2023-08-25 ENCOUNTER — EDUCATION (OUTPATIENT)
Dept: DIABETES SERVICES | Facility: HOSPITAL | Age: 48
End: 2023-08-25
Payer: MEDICARE

## 2023-08-25 DIAGNOSIS — E10.9 DIABETES MELLITUS TYPE 1, CONTROLLED, WITHOUT COMPLICATIONS: Primary | ICD-10-CM

## 2023-08-25 NOTE — PROGRESS NOTES
Xena Briseno 47 y.o. presents for Omnipod insulin pump instructions.  Patient is currently wearing a Medtronic insulin pump but she requested OmniPod insulin pump.  OmniPod insulin pump training check list completed.  Patient was able to maneuver pump screens without difficulty.  Patient applied insulin pump pod without difficulty 15-15 rule for treatment of low blood glucose was reviewed with patient. Patient was explained to always have back up supplies including blood glucose meter, strips, lancets, basal insulin, short acting insulin pens or vials with insulin syringes and emergency glucagon. Patient was given DSME staff contact information and encouraged patient to contact staff with questions or concerns.    Insulin pump settings:  12 AM to 12 AM basal rate 0.9 units/h  12 AM to 12 AM insulin sensitivity 1 unit per 25 points above target  12 AM to 3 AM carb ratio 1 unit per 10 carbs, 3 AM to 6 PM 1 unit of insulin per 9 carbs 6 PM to midnight 1 unit of insulin per 15 carbs  Maximum bolus 25 units  Active insulin 3 hours  Target blood glucose 120      Time started: 10: 10    Time ended: 11: 50    Dayami Shepard, RNC-AWHC, MLDE, [facility] [place][note 2]  08/25/2023

## 2023-08-28 DIAGNOSIS — E10.65 UNCONTROLLED TYPE 1 DIABETES MELLITUS WITH HYPERGLYCEMIA: ICD-10-CM

## 2023-08-28 RX ORDER — INSULIN ASPART 100 [IU]/ML
INJECTION, SOLUTION INTRAVENOUS; SUBCUTANEOUS
Qty: 50 ML | Refills: 0 | Status: SHIPPED | OUTPATIENT
Start: 2023-08-28

## 2023-09-07 NOTE — PROGRESS NOTES
Chief Complaint  Diabetes (Follow up, med mgt, A1c eval, pump eval )    Referred By: No ref. provider found    Subjective          Xena Briseno presents to [facility] for diabetes medication management    History of Present Illness    Visit type:  follow-up  Diabetes type:  Type 1  Current diabetes status/concerns/issues: Patient has been transitioned off of the Medtronic pump and onto the OmniPod insulin pump.  She denies have any concerns with the use of the device.  Other health concerns: She does express some ongoing concerns about her home environment.  Current Diabetes symptoms:    Polyuria: No   Polydipsia: No   Polyphagia: No   Blurred vision: No   Excessive fatigue: No  Known Diabetes complications:  Neuropathy: Numbness and tingling; Location: Feet  Renal: None  Eyes: None; Location: N/A  Amputation/Wounds: None  GI: Bloating and Intestinal Gas  Cardiovascular: Hypertension and Hyperlipidemia, CAD, MI (history of)  ED: N/A  Other: None  Hypoglycemia:  None reported at this time  Hypoglycemia Symptoms:  No hypoglycemia at this time  Current diabetes treatment:  OmniPod insulin pump using NovoLog insulin.   She is not always entering her carbohydrates into her pump because she is afraid she is going to go too low   Blood glucose device:  Dexcom CGM  Blood glucose monitoring frequency:  Continuous per CGM  Blood glucose range/average:   We were unable to upload her device is in the appointment today  Glucose Source: N/A  Diet:  Carbohydrate Counting, Limits high carb/sweet foods, Avoids sugary drinks  Activity/Exercise:  None    Past Medical History:   Diagnosis Date    Anxiety     Arthritis     Asthma     CAD (coronary artery disease)     Depression     Diabetes mellitus type I     GERD (gastroesophageal reflux disease)     Hyperlipidemia     Hypertension     Sleep apnea      Past Surgical History:   Procedure Laterality Date    CARDIAC CATHETERIZATION N/A 1/16/2023     Procedure: Left Heart Cath;  Surgeon: Santosh Tran MD;  Location:  [facility];  Service: Cardiology;  Laterality: N/A;    CARDIAC CATHETERIZATION N/A 2023    Procedure: Coronary angiography;  Surgeon: Satnosh Tran MD;  Location:  [facility];  Service: Cardiology;  Laterality: N/A;    CARDIAC CATHETERIZATION N/A 2023    Procedure: Percutaneous Coronary Intervention;  Surgeon: Santosh Tran MD;  Location:  [facility];  Service: Cardiology;  Laterality: N/A;    CARDIAC CATHETERIZATION N/A 2023    Procedure: Stent JALIL coronary;  Surgeon: Santosh Tran MD;  Location:  [facility];  Service: Cardiology;  Laterality: N/A;     SECTION      CHOLECYSTECTOMY      ENDOSCOPY      SPINE SURGERY       Family History   Problem Relation Age of Onset    Heart disease Mother     Parkinsonism Mother     Diabetes Father     Depression Father     Diabetes Brother     Stroke Paternal Grandmother      Social History     Socioeconomic History    Marital status:     Number of children: 2   Tobacco Use    Smoking status: Every Day    Smokeless tobacco: Never   Vaping Use    Vaping Use: Never used   Substance and Sexual Activity    Alcohol use: Not Currently    Drug use: Never    Sexual activity: Defer     Allergies   Allergen Reactions    Doxycycline Swelling and Anaphylaxis    Statins Angioedema       Current Outpatient Medications:     Accu-Chek FastClix Lancets misc, 1 each by Other route 4 (Four) Times a Day. for testing, Disp: 306 each, Rfl: 1    albuterol sulfate  (90 Base) MCG/ACT inhaler, Inhale 1 puff Every 4 (Four) Hours As Needed for Wheezing or Shortness of Air. May substitute per formulary  Indications: Asthma, Disp: 8.5 g, Rfl: 0    ARIPiprazole (ABILIFY) 15 MG tablet, Take 1 tablet by mouth Daily. Indications: Major Depressive Disorder, Disp: 30 tablet, Rfl: 2    aspirin 81 MG EC tablet, Take 1 tablet by mouth Daily.  "Indications: Stable Angina Pectoris, Disp: 30 tablet, Rfl: 3    cetirizine (zyrTEC) 10 MG tablet, Take 1 tablet by mouth Daily. Indications: Hayfever, Disp: 30 tablet, Rfl: 0    Continuous Blood Gluc Sensor (Dexcom G6 Sensor), Every 10 (Ten) Days., Disp: 3 each, Rfl: 11    Continuous Blood Gluc Transmit (Dexcom G6 Transmitter) misc, 1 each Every 3 (Three) Months., Disp: 1 each, Rfl: 3    fluticasone (FLONASE) 50 MCG/ACT nasal spray, 2 sprays into the nostril(s) as directed by provider Daily. Indications: Allergic Rhinitis, Disp: 16 g, Rfl: 0    glucagon (GlucaGen HypoKit) 1 MG injection, Inject 0.5 mg under the skin into the appropriate area as directed 1 (One) Time. Indications: Disorder with Low Blood Sugar, Disp: , Rfl:     glucose blood (Accu-Chek Guide) test strip, 1 each by Other route 4 (Four) Times a Day. Use as instructed  Indications: Insulin-Dependent Diabetes, Disp: 400 each, Rfl: 1    Insulin Pen Needle (Pen Needles) 32G X 4 MM misc, Inject 1 each under the skin into the appropriate area as directed 3 (Three) Times a Day Before Meals. Indications: Diabetes, Disp: 100 each, Rfl: 0    Insulin Syringe 31G X 5/16\" 0.3 ML misc, Inject 1 each under the skin into the appropriate area as directed 3 (Three) Times a Day Before Meals. Indications: Diabetes, Disp: 100 each, Rfl: 0    LORazepam (Ativan) 0.5 MG tablet, Take 1 tablet by mouth 2 (Two) Times a Day As Needed for Anxiety. Indications: Feeling Anxious, Disp: 60 tablet, Rfl: 0    metoprolol tartrate (LOPRESSOR) 25 MG tablet, Take 0.5 tablets by mouth Every 12 (Twelve) Hours. Indications: Angina Pectoris, Disp: 60 tablet, Rfl: 0    montelukast (SINGULAIR) 10 MG tablet, Take 1 tablet by mouth Every Night. Indications: Hayfever (Patient taking differently: Take 1 tablet by mouth Every Night.), Disp: 30 tablet, Rfl: 0    NovoLOG 100 UNIT/ML injection, Inject 60 Units under the skin into the appropriate area as directed Daily. Per-Insulin Pump, Disp: 50 mL, Rfl: " "0    ticagrelor (BRILINTA) 90 MG tablet tablet, Take 1 tablet by mouth 2 (Two) Times a Day. Indications: Percutaneous Coronary Intervention, Disp: 60 tablet, Rfl: 0    tiotropium bromide monohydrate (Spiriva Respimat) 2.5 MCG/ACT aerosol solution inhaler, Inhale 2 puffs Daily. Indications: Chronic Obstructive Lung Disease, Disp: 4 g, Rfl: 0    vilazodone (VIIBRYD) 20 MG tablet tablet, Take 1 tablet by mouth Daily. Indications: Major Depressive Disorder, Disp: 30 tablet, Rfl: 2    Objective     Vitals:    09/08/23 1419   BP: 152/91   BP Location: Left arm   Patient Position: Sitting   Cuff Size: Adult   Pulse: 111   SpO2: 100%   Weight: 77.1 kg (170 lb)   Height: 165.1 cm (65\")   PainSc:   8     Body mass index is 28.29 kg/m².    Physical Exam  Constitutional:       Appearance: Normal appearance.      Comments: Overweight (BMI 25 - 29.9) Pt Current BMI = 28.29    HENT:      Head: Normocephalic and atraumatic.      Right Ear: External ear normal.      Left Ear: External ear normal.      Nose: Nose normal.   Eyes:      Extraocular Movements: Extraocular movements intact.      Conjunctiva/sclera: Conjunctivae normal.   Pulmonary:      Effort: Pulmonary effort is normal.   Musculoskeletal:         General: Normal range of motion.      Cervical back: Normal range of motion.   Skin:     General: Skin is warm and dry.   Neurological:      General: No focal deficit present.      Mental Status: She is alert and oriented to person, place, and time. Mental status is at baseline.   Psychiatric:         Mood and Affect: Mood normal.         Behavior: Behavior normal.         Thought Content: Thought content normal.         Judgment: Judgment normal.           Result Review :   The following data was reviewed by: ARACELI Sotomayor on 09/08/2023:    Most Recent A1C          9/8/2023    14:23   HGBA1C Most Recent   Hemoglobin A1C 9.5        A1C Last 3 Results          5/4/2023    19:27 7/14/2023    11:12 9/8/2023    14:23 "   HGBA1C Last 3 Results   Hemoglobin A1C 11.20  10  9.5      A1c collected in the office today is 9.5%, indicating Uncontrolled Type I diabetes.  This result is down from the prior result of 10% collected on 7/14/23           Assessment: She has had a slight improvement in her A1c but remains well above target.  She is now using the OmniPod insulin pump with Dexcom continuous glucose sensor.  Unfortunately, we were unable to upload her device today as the software and has not been connected to her device.      Diagnoses and all orders for this visit:    1. Uncontrolled type 1 diabetes mellitus with hyperglycemia (Primary)  -     POC Glycosylated Hemoglobin (Hb A1C)    2. Type 1 diabetes mellitus with diabetic neuropathy    3. Overweight (BMI 25.0-29.9)    4. Insulin pump in place    5. Uses self-applied continuous glucose monitoring device        Plan: Since we are unable to upload her device to review glucose patterns, no changes were made in the settings today.  The patient was given instructions on how to get connected to the appropriate apps.  When she has completed this will we will upload reports and review those to determine what changes are necessary.      The patient will monitor her blood glucose levels using the continuous glucose sensor.  If she develops problematic hyperglycemia or hypoglycemia or adverse drug reactions, she will contact the office for further instructions.        Follow Up     Return in about 6 weeks (around 10/20/2023) for Pump Eval, CGM Follow-up.    Patient was given instructions and counseling regarding her condition or for health maintenance advice. Please see specific information pulled into the AVS if appropriate.     Jaquelin Grover, ARACELI  09/08/2023      Dictated Utilizing Dragon Dictation.  Please note that portions of this note were completed with a voice recognition program.  Part of this note may be an electronic transcription/translation of spoken language to printed  text using the Dragon Dictation System.

## 2023-09-08 ENCOUNTER — TELEPHONE (OUTPATIENT)
Dept: DIABETES SERVICES | Facility: CLINIC | Age: 48
End: 2023-09-08

## 2023-09-08 ENCOUNTER — OFFICE VISIT (OUTPATIENT)
Dept: DIABETES SERVICES | Facility: CLINIC | Age: 48
End: 2023-09-08
Payer: MEDICARE

## 2023-09-08 VITALS
HEART RATE: 111 BPM | OXYGEN SATURATION: 100 % | BODY MASS INDEX: 28.32 KG/M2 | WEIGHT: 170 LBS | DIASTOLIC BLOOD PRESSURE: 91 MMHG | SYSTOLIC BLOOD PRESSURE: 152 MMHG | HEIGHT: 65 IN

## 2023-09-08 DIAGNOSIS — E66.3 OVERWEIGHT (BMI 25.0-29.9): ICD-10-CM

## 2023-09-08 DIAGNOSIS — E10.40 TYPE 1 DIABETES MELLITUS WITH DIABETIC NEUROPATHY: ICD-10-CM

## 2023-09-08 DIAGNOSIS — E10.65 UNCONTROLLED TYPE 1 DIABETES MELLITUS WITH HYPERGLYCEMIA: Primary | ICD-10-CM

## 2023-09-08 DIAGNOSIS — Z97.8 USES SELF-APPLIED CONTINUOUS GLUCOSE MONITORING DEVICE: ICD-10-CM

## 2023-09-08 DIAGNOSIS — Z96.41 INSULIN PUMP IN PLACE: ICD-10-CM

## 2023-09-08 LAB
EXPIRATION DATE: ABNORMAL
HBA1C MFR BLD: 9.5 %
Lab: ABNORMAL

## 2023-09-08 PROCEDURE — 99213 OFFICE O/P EST LOW 20 MIN: CPT | Performed by: NURSE PRACTITIONER

## 2023-09-08 PROCEDURE — 3077F SYST BP >= 140 MM HG: CPT | Performed by: NURSE PRACTITIONER

## 2023-09-08 PROCEDURE — 3080F DIAST BP >= 90 MM HG: CPT | Performed by: NURSE PRACTITIONER

## 2023-09-08 PROCEDURE — 3046F HEMOGLOBIN A1C LEVEL >9.0%: CPT | Performed by: NURSE PRACTITIONER

## 2023-09-08 PROCEDURE — 1159F MED LIST DOCD IN RCRD: CPT | Performed by: NURSE PRACTITIONER

## 2023-09-08 PROCEDURE — 1160F RVW MEDS BY RX/DR IN RCRD: CPT | Performed by: NURSE PRACTITIONER

## 2023-09-08 NOTE — TELEPHONE ENCOUNTER
Called [facility], requested a copy of pt's gastric emptying study report as Jaquelin requested. Rep from the hospital said she bhakti be faxing it to our office. I gave her our fax number.

## 2023-09-13 ENCOUNTER — EDUCATION (OUTPATIENT)
Dept: DIABETES SERVICES | Facility: HOSPITAL | Age: 48
End: 2023-09-13
Payer: MEDICARE

## 2023-09-13 NOTE — PROGRESS NOTES
New Omnipod controller programmed to previous controller settings.  Patient did not have any questions or concerns.

## 2023-10-11 ENCOUNTER — TELEPHONE (OUTPATIENT)
Dept: DIABETES SERVICES | Facility: HOSPITAL | Age: 48
End: 2023-10-11
Payer: MEDICARE

## 2023-10-11 ENCOUNTER — TELEPHONE (OUTPATIENT)
Dept: DIABETES SERVICES | Facility: HOSPITAL | Age: 48
End: 2023-10-11

## 2023-10-11 DIAGNOSIS — E10.65 UNCONTROLLED TYPE 1 DIABETES MELLITUS WITH HYPERGLYCEMIA: ICD-10-CM

## 2023-10-11 RX ORDER — PROCHLORPERAZINE 25 MG/1
SUPPOSITORY RECTAL
Qty: 3 EACH | Refills: 11 | Status: SHIPPED | OUTPATIENT
Start: 2023-10-11

## 2023-10-11 RX ORDER — INSULIN ASPART 100 [IU]/ML
INJECTION, SOLUTION INTRAVENOUS; SUBCUTANEOUS
Qty: 50 ML | Refills: 0 | Status: SHIPPED | OUTPATIENT
Start: 2023-10-11 | End: 2023-10-30

## 2023-10-11 NOTE — TELEPHONE ENCOUNTER
Caller: Xena Briseno     Relationship: SELF     Best call back number: [phone]    What is your medical concern? PT HAS A BROKEN FOOT, TOES AND RIBS. RECORDS FROM [facility]. PLEASE PULL RECORDS ASAP.

## 2023-10-11 NOTE — TELEPHONE ENCOUNTER
Caller: Finn Xena    Relationship: Self    Best call back number: [phone]    Requested Prescriptions:   NOVALOG AND DEXCOM SENSORS   Pharmacy where request should be sent:  [facility] [place] - [phone]    Last office visit with prescribing clinician: 9/8/2023   Last telemedicine visit with prescribing clinician: Visit date not found   Next office visit with prescribing clinician: 10/16/2023     Additional details provided by patient: ALL SCRIPTS TRANSFERRED EXCEPT THESE TWO, PLEASE CALL IN ASAP     Does the patient have less than a 3 day supply:  [x] Yes  [] No    Would you like a call back once the refill request has been completed: [x] Yes [] No    If the office needs to give you a call back, can they leave a voicemail: [x] Yes [] No    Robinson Padgett Rep   10/11/23 14:09 EDT

## 2023-10-12 DIAGNOSIS — E10.65 UNCONTROLLED TYPE 1 DIABETES MELLITUS WITH HYPERGLYCEMIA: ICD-10-CM

## 2023-10-13 RX ORDER — INSULIN ASPART 100 [IU]/ML
INJECTION, SOLUTION INTRAVENOUS; SUBCUTANEOUS
Qty: 50 ML | Refills: 0 | OUTPATIENT
Start: 2023-10-13

## 2023-10-29 DIAGNOSIS — E10.65 UNCONTROLLED TYPE 1 DIABETES MELLITUS WITH HYPERGLYCEMIA: ICD-10-CM

## 2023-10-30 RX ORDER — INSULIN ASPART 100 [IU]/ML
INJECTION, SOLUTION INTRAVENOUS; SUBCUTANEOUS
Qty: 50 ML | Refills: 0 | Status: SHIPPED | OUTPATIENT
Start: 2023-10-30

## 2023-11-30 ENCOUNTER — TRANSCRIBE ORDERS (OUTPATIENT)
Dept: ADMINISTRATIVE | Facility: HOSPITAL | Age: 48
End: 2023-11-30
Payer: MEDICARE

## 2023-11-30 DIAGNOSIS — R07.89 OTHER CHEST PAIN: Primary | ICD-10-CM

## 2023-12-01 DIAGNOSIS — E10.65 UNCONTROLLED TYPE 1 DIABETES MELLITUS WITH HYPERGLYCEMIA: ICD-10-CM

## 2023-12-01 NOTE — TELEPHONE ENCOUNTER
Xena Briseno    Relationship: Self    Best call back number: [phone]     Requested Prescriptions:   Requested Prescriptions     Pending Prescriptions Disp Refills    Insulin Aspart (NovoLOG) 100 UNIT/ML injection 50 mL 0        Pharmacy where request should be sent:    [facility] - [address] [id number]  Phone: [phone]    Last office visit with prescribing clinician: 9/8/2023   Next office visit with prescribing clinician: 12/22/2023     Additional details provided by patient: PATIENT BROKE A VIAL OF INSULIN. REQUESTING ANOTHER PRESCRIPTION/REFILL    Does the patient have less than a 3 day supply:  [x] Yes  [] No    Would you like a call back once the refill request has been completed: [] Yes [x] No    If the office needs to give you a call back, can they leave a voicemail: [] Yes [x] No    Robinson Baer Rep   12/01/23 16:21 EST

## 2023-12-05 RX ORDER — INSULIN ASPART 100 [IU]/ML
67 INJECTION, SOLUTION INTRAVENOUS; SUBCUTANEOUS DAILY
Qty: 60 ML | Refills: 1 | Status: SHIPPED | OUTPATIENT
Start: 2023-12-05 | End: 2024-06-02

## 2023-12-19 ENCOUNTER — TELEPHONE (OUTPATIENT)
Dept: DIABETES SERVICES | Facility: HOSPITAL | Age: 48
End: 2023-12-19
Payer: MEDICARE

## 2023-12-19 NOTE — TELEPHONE ENCOUNTER
Caller: Xena Briseno    Relationship: Self    Best call back number: [phone]    What is the best time to reach you: ANYTIME    Who are you requesting to speak with (clinical staff, provider,  specific staff member): CLINICAL        What was the call regarding: SHE IS REQUESTING THE NUMBER FOR THE PHARMACY FOR HER OMNIPODS

## 2023-12-29 ENCOUNTER — TELEPHONE (OUTPATIENT)
Dept: DIABETES SERVICES | Facility: HOSPITAL | Age: 48
End: 2023-12-29
Payer: MEDICARE

## 2023-12-29 NOTE — TELEPHONE ENCOUNTER
Caller: Xena Briseno    Relationship to patient: Self    Best call back number: [phone]     Patient is needing: pt stated that she is having some lows for like three hours. Pt stated that she can drink a mountain dew and her sugar is still, low. Please advise .

## 2024-01-31 ENCOUNTER — TELEPHONE (OUTPATIENT)
Dept: DIABETES SERVICES | Facility: HOSPITAL | Age: 49
End: 2024-01-31
Payer: MEDICARE

## 2024-01-31 NOTE — TELEPHONE ENCOUNTER
WE RECEIVED A CMN REQ 1-31-24 FROM [facility]. WERE NOT ABLE TO COMPLETE DOC, PT LAST SEEN IN OFFICE 9-8-23, NO FOLLOW UP APPT MADE. FAXED DOC BACK WITH ATTACHED NOTE

## 2024-02-27 ENCOUNTER — TELEPHONE (OUTPATIENT)
Dept: DIABETES SERVICES | Facility: HOSPITAL | Age: 49
End: 2024-02-27
Payer: MEDICARE

## 2024-02-27 DIAGNOSIS — E10.65 UNCONTROLLED TYPE 1 DIABETES MELLITUS WITH HYPERGLYCEMIA: ICD-10-CM

## 2024-02-27 RX ORDER — INSULIN ASPART 100 [IU]/ML
67 INJECTION, SOLUTION INTRAVENOUS; SUBCUTANEOUS DAILY
Qty: 60 ML | Refills: 1 | Status: SHIPPED | OUTPATIENT
Start: 2024-02-27 | End: 2024-08-25

## 2024-02-27 NOTE — TELEPHONE ENCOUNTER
REFILL REQ REC FROM [facility] FOR DEXCOM SENS, CANNOT COMPLETE REQUEST AT THIS TIME,PT LAST SEEN 9-8-23 NO FOLLOW UP APPT MADE, DOC SENT BACK TO PHARM. 2-27-24

## 2024-02-29 DIAGNOSIS — E10.65 UNCONTROLLED TYPE 1 DIABETES MELLITUS WITH HYPERGLYCEMIA: Primary | ICD-10-CM

## 2024-02-29 RX ORDER — PROCHLORPERAZINE 25 MG/1
SUPPOSITORY RECTAL
Qty: 9 EACH | Refills: 1 | Status: SHIPPED | OUTPATIENT
Start: 2024-02-29

## 2024-03-04 ENCOUNTER — TELEPHONE (OUTPATIENT)
Dept: DIABETES SERVICES | Facility: HOSPITAL | Age: 49
End: 2024-03-04
Payer: MEDICARE

## 2024-03-04 NOTE — TELEPHONE ENCOUNTER
[facility] REQ REC FOR DEXCOM SENSOR 3-4-24, PT LAST SEEN IN OFFICE 9-8-23 NO FOLLOW UP APPT MADE. NOT ABLE TO COMP PA AT THIS TIME UNTIL PT MAKES AN APPT

## 2024-05-30 RX ORDER — INSULIN PMP CART,AUT,G6/7,CNTR
EACH SUBCUTANEOUS
Qty: 10 EACH | Refills: 6 | OUTPATIENT
Start: 2024-05-30

## 2024-07-01 NOTE — TELEPHONE ENCOUNTER
[facility] Dermatology Note      Dermatology Problem List:    [facility] patient of Dr. Mulugeta Hubbard  1.  Seborrheic dermatitis: Ketoconazole shampoo 3 times weekly  2.  Nummular dermatitis of the central forehead with a negative KOH scraping on July 11, 2019: Hydrocortisone 2.5% cream twice daily for 2 to 3 weeks  3.  Irritated seborrheic keratosis of the left forearm status post cryotherapy on July 11, 2018    Encounter Date: Jul 11, 2019    CC:   Chief Complaint   Patient presents with     Derm Problem     Mireille  is here for redness onher forehead and left arm.       History of Present Illness:  Ms. Mireille Walls is a 43 year old female who presents for evaluation of a new rash of the central forehead.  She says rash onset was roughly 2 to 3 months ago.  It is not associated with any symptoms.  She notes that it is red, scaly but does not weep.  She reports a history of seasonal allergies, but otherwise denies atopy.  She has not used any medications for the rash.  She uses Neutrogena foundation almost daily.  She uses ketoconazole shampoo for seborrheic dermatitis 2-3 times a week.  She denies rash elsewhere on the body.  She also notes today at the left forearm a roughly 6 mm stuck on papule that is irritated and wonders if this could be treated somehow.  She denies any other dermatologic problems or constitutional symptoms..     Past Medical History:   Patient Active Problem List   Diagnosis     Depression     OCD (obsessive compulsive disorder)     IBS (irritable bowel syndrome)     Migraines     Hyperlipidemia     Contraception -- abstinence     Diaphoresis     Plantar fascial fibromatosis     Acne     Hair loss     Anxiety associated with depression     Right-sided low back pain without sciatica     Past Medical History:   Diagnosis Date     Abnormal Pap smear 2006?    dysplasia  X 1; paps OK since then...     Arm DVT (deep venous thromboembolism), acute (H) 2008    hx with phlebitis  Med pended to the wrong pool.     Med and pharmacy have been pended    Patient called in and left voice message stating that she needs to update her address as well as pharmacy. Patient stated that address and pharmacy need to be updated on chart. Patient stated new address is [address] [place] [address] in [facility] [id number]. The new pharmacy is [facility] in [place], patient stated she needs refills of novolog and Dexcom G6 sent over as well as needs upcoming appt rescheduled to [place].     Prescriptions sent to pharmacy as well as pharmacy updated as well as address.    in IVs after a surgery     Breast disorder 2016    2D mammo, then 3D & u/s to r/o = scar tissue from reduction     Cholesterol serum elevated     runs in family     Complication of anesthesia 2000?, 2016    very slow to awake from both gen. anesth. & conscious sedati     Depression      Encounter for insertion of mirena IUD 2011    D/C'd w increased acne     Hypertension 2017&2018    jamin. during exercise, caused by stimulant for depression?     IBS (irritable bowel syndrome) 2002    under control, better with probiotic     Menarche age 14    cycles q 1-2 months Xs 3-4 d w bad cramps     Migraines 2008    a lot better now, may be stress related     OCD (obsessive compulsive disorder)      Seasonal allergies      Past Surgical History:   Procedure Laterality Date     BIOPSY  2000    dermatology-moles     HEAD & NECK SURGERY  1990?, 2016    wisdom teeth extracted, gum tissue grafting/oral surgery     LASIK Bilateral 2008     MAMMOPLASTY REDUCTION BILATERAL  1197       Social History:  Patient reports that she has never smoked. She has never used smokeless tobacco. She reports that she does not drink alcohol or use drugs.    Family History:  Family History   Problem Relation Age of Onset     Cancer Father 57        bladder ca     Genitourinary Problems Father         Polycystic kidney     Other Cancer Father         bladder cancer     Genetic Disorder Father         polycystic kidneys     Cancer - colorectal Paternal Grandmother 75     Colon Cancer Paternal Grandmother      Mental Illness Other         SeeBelow     Cancer Mother         skin cancer     Hyperlipidemia Mother         runs in Mom's family-several     Other Cancer Mother         skin cancer     Asthma Mother         runs in Mom's family     Skin Cancer Mother      Alcohol/Drug Paternal Aunt         Xs 2     Thyroid Disease Maternal Grandmother         hypothyroid     Obesity Maternal Grandmother      Coronary Artery Disease Maternal Grandfather          heart attack     Mental Illness Other         Schizophrenia     Mental Illness Cousin         OCD     Depression Other         chronic and episodic     Mental Illness Other         ChronicMajorDepression     Anxiety Disorder Other         gen. anxiety disorder     Mental Illness Other         OCD     Substance Abuse Other         alcoholism-runs in Dad's family     Genetic Disorder Other         polycystic kidneys     Genetic Disorder Brother         [id number]     Asthma Brother      Deep Vein Thrombosis (DVT) Brother      Genetic Disorder Other         [id number]     C.A.D. No family hx of      Hypertension No family hx of      Breast Cancer No family hx of        Medications:  Current Outpatient Medications   Medication Sig Dispense Refill     ARIPiprazole (ABILIFY) 5 MG tablet Pt takes half       buPROPion (WELLBUTRIN XL) 150 MG 24 hr tablet Take 150 mg by mouth every morning.       CALCIUM PO Take  by mouth.       Cholecalciferol (D3 VITAMIN PO)        COD LIVER OIL PO Take  by mouth.       ketoconazole (NIZORAL) 2 % external shampoo Apply topically three times a week 120 mL 6     loratadine (CLARITIN) 10 MG tablet Take 10 mg by mouth daily       Multiple Vitamins-Minerals (MULTIPLE VITAMINS/WOMENS PO) Take  by mouth.       Probiotic Product (PROBIOTIC PO) Take  by mouth.       rizatriptan (MAXALT-MLT) 5 MG ODT Take 1-2 tablets (5-10 mg) by mouth at onset of headache for migraine May repeat dose in 2 hours.  Do not exceed 30 mg in 24 hours 10 tablet 1     vortioxetine (TRINTELLIX) 10 MG tablet Take 1 tablet (10 mg) by mouth daily       ketoconazole (NIZORAL) 2 % shampoo Apply topically to scalp, lather, leave on for 3-5 minutes, then rinse.  Use ever other day (Patient not taking: Reported on 7/11/2019) 120 mL 12        Allergies   Allergen Reactions     Phenergan Vc [Promethazine Vc] Visual Disturbance         Review of Systems:  -Skin Establ Pt: The patient denies any new rash, pruritus, or lesions that  are symptomatic, changing or bleeding, except as per HPI.  -Constitutional: Otherwise feeling well today, in usual state of health.  -HEENT: Patient denies nonhealing oral sores.  -Skin: As above in HPI. No additional skin concerns.    Physical exam:  Vitals: There were no vitals taken for this visit.  GEN: This is a well developed, well-nourished female in no acute distress, in a pleasant mood.    SKIN: Waist-up skin, which includes the head/face, neck, both arms, chest, back, abdomen, digits and/or nails was examined.  -At the central forehead, there is a roughly 2 to 3 cm erythematous and scaly thin plaque.  There is no rash on other areas examined.  There are no nail changes.  - Scalp scale noted.  -At the left dorsal forearm, there is a 7 mm light brown stuck on papule that appears irritated.  -No other lesions of concern on areas examined.  -KOH scraping of central forehead rash was negative for fungus (I reviewed the KOH and it was negative.  KB)    Impression/Plan:  1. Rash at central forehead    The differential diagnosis includes nummular dermatitis versus seborrheic dermatitis. We are less suspicious for tinea faceii given the negative KOH scraping.  We will treat her for nummular dermatitis or seborrheic dermatitis with a topical steroid.    Ordered hydrocortisone 2.5% cream twice daily for up to 2 to 3 weeks    Return to clinic in 4 to 6 weeks for reevaluation    2. Seborrheic keratosis, inflamed, left dorsal forearm    Cryotherapy procedure note: After verbal consent and discussion of risks and benefits including but no limited to dyspigmentation/scar, blister, and pain, 1 lesion was treated with 1-2mm freeze border for 2 cycles with liquid nitrogen. Post cryotherapy instructions were provided.    CC Referred MD Butch  No address on file on close of this encounter.  Follow-up in 4-6 weeks, earlier for new or changing lesions.     Dr. Lucas staffed the patient.    Staff Involved:  Resident(Dr. Dalal  Stevie)/Staff  .I, Melanie Lucas MD, saw this patient with the resident and agree with the resident s findings and plan of care as documented in the resident s note.

## 2024-08-05 DIAGNOSIS — E10.65 UNCONTROLLED TYPE 1 DIABETES MELLITUS WITH HYPERGLYCEMIA: ICD-10-CM

## 2024-08-05 RX ORDER — INSULIN ASPART 100 [IU]/ML
INJECTION, SOLUTION INTRAVENOUS; SUBCUTANEOUS
Qty: 40 ML | OUTPATIENT
Start: 2024-08-05

## 2024-09-02 NOTE — PROGRESS NOTES
"    Patient Name: Xena Briseno  :1975  47 y.o.      Patient Care Team:  Kim Aguillon as PCP - General (Nurse Practitioner)  Jaquelin Grover APRN as Nurse Practitioner (Endocrinology)    Chief Complaint:   DKA    Interval History:   Transferred to general floor.  Feels well, no complaints of further chest pain.      Objective   Vital Signs  Temp:  [97.8 °F (36.6 °C)-98.8 °F (37.1 °C)] 98.6 °F (37 °C)  Heart Rate:  [] 84  Resp:  [16] 16  BP: ()/(56-76) 118/70    Intake/Output Summary (Last 24 hours) at 2023  Last data filed at 1/15/2023 2031  Gross per 24 hour   Intake 360 ml   Output 500 ml   Net -140 ml     Flowsheet Rows    Flowsheet Row First Filed Value   Admission Height 165.1 cm (65\") Documented at 2023   Admission Weight 62.3 kg (137 lb 5.6 oz) Documented at 2023          GEN: no distress, alert and oriented  HEENT: NACT, EOMI, moist mucous membranes  Lungs: CTAB, no wheezes, rales or rhonchi  CV: normal rate, regular rhythm, normal S1, S2, no murmurs, +2 radial pulses b/l, no carotid bruit  Abdomen: soft, nontender, nondistended, NABS  Extremities: no edema  Skin: no rash, warm, dry  Heme/Lymph: no bruising  Psych: organized thought, normal behavior and affect    Results Review:    Results from last 7 days   Lab Units 23  0515   SODIUM mmol/L 139   POTASSIUM mmol/L 3.5   CHLORIDE mmol/L 106   CO2 mmol/L 24.0   BUN mg/dL 9   CREATININE mg/dL 0.56*   GLUCOSE mg/dL 311*   CALCIUM mg/dL 7.8*     Results from last 7 days   Lab Units 23  0503   TROPONIN T ng/mL 0.240*     Results from last 7 days   Lab Units 23  0515   WBC 10*3/mm3 5.45   HEMOGLOBIN g/dL 10.9*   HEMATOCRIT % 32.4*   PLATELETS 10*3/mm3 253     Results from last 7 days   Lab Units 23  0503 23  2316   INR  1.00 1.25*   APTT seconds 28.9 115.3*     Results from last 7 days   Lab Units 23  2320   MAGNESIUM mg/dL 2.0                   Medication Review: "   insulin glargine, 15 Units, Subcutaneous, Nightly  insulin lispro, 0-14 Units, Subcutaneous, 4x Daily With Meals & Nightly  pantoprazole, 40 mg, Oral, Q AM  sodium chloride, 10 mL, Intravenous, Q12H         sodium chloride, 100 mL/hr, Last Rate: 100 mL/hr (01/15/23 1302)        Assessment & Plan   #DKA  #Elevated troponin  #Hypertension  #Hyperlipidemia     47-year-old woman current smoker with diabetes, hypertension, hyperlipidemia who was transferred to [facility] for further management of DKA and elevated troponin.  EKG with normal sinus rhythm and no ischemic changes.  This is unlikely to be ACS and her elevated troponin is likely demand from her DKA however she clearly has risk factors for CAD. She has already been started on heparin drip which we can discontinue.     Echocardiogram 1/15/2023 with normal systolic function, normal diastolic function, mildly increased left atrial volume, normal RVSP.    - Continue DKA treatment per primary team, now on subcu insulin  - Left heart cath today    Santosh Tran MD  [facility]  01/16/23  08:20 EST     36.9

## (undated) DEVICE — CATH DIAG DXTERITY ULTRA TRANSRADIAL 4.0 5F 100CM 0/SH

## (undated) DEVICE — Device: Brand: ASAHI SION BLUE

## (undated) DEVICE — CATH GUIDE LAUNCHER FR 3.5 6F 100CM STD LT

## (undated) DEVICE — TR BAND RADIAL ARTERY COMPRESSION DEVICE: Brand: TR BAND

## (undated) DEVICE — KT MANIFLD CARDIAC

## (undated) DEVICE — PK CATH CARD 40

## (undated) DEVICE — GW EMR FIX EXCHG J STD .035 3MM 260CM

## (undated) DEVICE — DEV INDEFLATOR P/N 580289

## (undated) DEVICE — NC TREK NEO™ CORONARY DILATATION CATHETER 2.75 MM X 15 MM / RAPID-EXCHANGE: Brand: NC TREK NEO™

## (undated) DEVICE — GLIDESHEATH SLENDER STAINLESS STEEL KIT: Brand: GLIDESHEATH SLENDER